# Patient Record
Sex: FEMALE | Race: WHITE | NOT HISPANIC OR LATINO | Employment: OTHER | URBAN - METROPOLITAN AREA
[De-identification: names, ages, dates, MRNs, and addresses within clinical notes are randomized per-mention and may not be internally consistent; named-entity substitution may affect disease eponyms.]

---

## 2023-03-14 ENCOUNTER — OFFICE VISIT (OUTPATIENT)
Dept: PRIMARY CARE | Facility: CLINIC | Age: 78
End: 2023-03-14
Payer: MEDICARE

## 2023-03-14 VITALS
BODY MASS INDEX: 27.92 KG/M2 | TEMPERATURE: 96.5 F | SYSTOLIC BLOOD PRESSURE: 120 MMHG | WEIGHT: 173 LBS | DIASTOLIC BLOOD PRESSURE: 70 MMHG

## 2023-03-14 DIAGNOSIS — M25.562 CHRONIC PAIN OF LEFT KNEE: Primary | ICD-10-CM

## 2023-03-14 DIAGNOSIS — G89.29 CHRONIC PAIN OF LEFT KNEE: Primary | ICD-10-CM

## 2023-03-14 PROCEDURE — 99213 OFFICE O/P EST LOW 20 MIN: CPT | Performed by: FAMILY MEDICINE

## 2023-03-14 PROCEDURE — 1159F MED LIST DOCD IN RCRD: CPT | Performed by: FAMILY MEDICINE

## 2023-03-14 RX ORDER — LEVOTHYROXINE SODIUM 112 UG/1
1 TABLET ORAL DAILY
COMMUNITY
Start: 2020-10-27 | End: 2023-04-12 | Stop reason: SDUPTHER

## 2023-03-14 RX ORDER — CALCIUM CARBONATE/VITAMIN D3 600MG-5MCG
1 TABLET ORAL 2 TIMES DAILY
COMMUNITY
Start: 2015-06-15 | End: 2024-05-13 | Stop reason: ALTCHOICE

## 2023-03-14 RX ORDER — PANTOPRAZOLE SODIUM 20 MG/1
20 TABLET, DELAYED RELEASE ORAL DAILY
COMMUNITY
End: 2024-03-15

## 2023-03-14 RX ORDER — PHENYLPROPANOLAMINE/CLEMASTINE 75-1.34MG
200 TABLET, EXTENDED RELEASE ORAL
COMMUNITY
Start: 2016-03-16 | End: 2024-03-15 | Stop reason: ALTCHOICE

## 2023-03-14 RX ORDER — TRAZODONE HYDROCHLORIDE 50 MG/1
1 TABLET ORAL NIGHTLY PRN
COMMUNITY
Start: 2021-11-10

## 2023-03-14 RX ORDER — BIOTIN 10 MG
1 TABLET ORAL DAILY
COMMUNITY
Start: 2022-01-06

## 2023-03-14 RX ORDER — HYDROXYZINE HYDROCHLORIDE 25 MG/1
25 TABLET, FILM COATED ORAL ONCE
COMMUNITY
Start: 2021-10-05 | End: 2023-09-19

## 2023-03-14 RX ORDER — FLUTICASONE PROPIONATE 50 MCG
2 SPRAY, SUSPENSION (ML) NASAL DAILY
COMMUNITY
Start: 2020-11-24 | End: 2023-10-25 | Stop reason: SDUPTHER

## 2023-03-14 RX ORDER — CHOLECALCIFEROL (VITAMIN D3) 25 MCG
1 TABLET ORAL DAILY
COMMUNITY
Start: 2022-10-19 | End: 2023-11-24

## 2023-03-14 RX ORDER — MELOXICAM 7.5 MG/1
7.5 TABLET ORAL DAILY
Qty: 30 TABLET | Refills: 0 | Status: SHIPPED
Start: 2023-03-14 | End: 2023-07-31 | Stop reason: ALTCHOICE

## 2023-03-14 ASSESSMENT — ENCOUNTER SYMPTOMS
BACK PAIN: 1
JOINT SWELLING: 1
CONSTITUTIONAL NEGATIVE: 1
ARTHRALGIAS: 1

## 2023-03-14 NOTE — PROGRESS NOTES
Subjective   Patient ID: Kristin Charles is a 77 y.o. female who presents for Knee Pain (Left knee pain).    Pt presents with sudden onset of left knee pain 2 weeks ago  No falls, no trauma  The pain is in the front of her knee  It radiates down the front of her leg  She has had sciatica in the past, she denies any hip or groin pain  Only 1 step in her heart   Previous knee imaging about 6 years ago due to the similar pain  She was seen at  Ortho  Reviewed 2014 knee images at   She was then seen at [facility] and had a steroid injection  She did PT in the past      She sits and does not move much anymore  Unable to vacuum  She feels low back tightness, worse on the right side    She has used Tylenol, heat and ice               Review of Systems   Constitutional: Negative.    Musculoskeletal:  Positive for arthralgias, back pain, gait problem and joint swelling.       Objective   /70 (BP Location: Right arm, Patient Position: Sitting)   Temp 35.8 °C (96.5 °F)   Wt 78.5 kg (173 lb)   BMI 27.92 kg/m²     Physical Exam  Constitutional:       Appearance: Normal appearance.   Musculoskeletal:         General: Swelling and tenderness present. No deformity or signs of injury.      Lumbar back: Spasms and tenderness present.      Left knee: Swelling and crepitus present. No erythema, ecchymosis, lacerations or bony tenderness. Decreased range of motion. Tenderness present over the medial joint line and MCL.      Instability Tests: Anterior drawer test negative.      Right lower leg: No edema.      Left lower leg: No edema.   Skin:     General: Skin is warm and dry.   Neurological:      General: No focal deficit present.      Mental Status: She is alert and oriented to person, place, and time.         Assessment/Plan   Diagnoses and all orders for this visit:  Chronic pain of left knee  -     XR knee left 4+ views; Future  -     meloxicam (Mobic) 7.5 mg tablet; Take 1 tablet (7.5 mg) by mouth once daily. Advised  to take with food, discussed R/B/SE  -     Referral to Orthopaedic Surgery; Future      Advised pt to call with any new or worsening symptoms

## 2023-04-06 PROBLEM — N32.81 OVERACTIVE BLADDER: Status: ACTIVE | Noted: 2023-04-06

## 2023-04-06 PROBLEM — K64.4 EXTERNAL HEMORRHOIDS: Status: ACTIVE | Noted: 2023-04-06

## 2023-04-06 PROBLEM — M19.90 OSTEOARTHRITIS: Status: ACTIVE | Noted: 2023-04-06

## 2023-04-06 PROBLEM — M17.12 LEFT KNEE DJD: Status: ACTIVE | Noted: 2023-04-06

## 2023-04-06 PROBLEM — F43.21 GRIEF AT LOSS OF CHILD: Status: ACTIVE | Noted: 2023-04-06

## 2023-04-06 PROBLEM — G47.00 INSOMNIA: Status: ACTIVE | Noted: 2023-04-06

## 2023-04-06 PROBLEM — N89.8 VAGINAL DISCHARGE: Status: ACTIVE | Noted: 2023-04-06

## 2023-04-06 PROBLEM — E66.3 OVERWEIGHT (BMI 25.0-29.9): Status: ACTIVE | Noted: 2023-04-06

## 2023-04-06 PROBLEM — R00.2 HEART PALPITATIONS: Status: ACTIVE | Noted: 2023-04-06

## 2023-04-06 PROBLEM — J33.9 NASAL POLYP: Status: ACTIVE | Noted: 2023-04-06

## 2023-04-06 PROBLEM — M17.10 OSTEOARTHRITIS, LOCALIZED, KNEE: Status: ACTIVE | Noted: 2023-04-06

## 2023-04-06 PROBLEM — L65.9 HAIR LOSS: Status: ACTIVE | Noted: 2023-04-06

## 2023-04-06 PROBLEM — N18.30 CKD (CHRONIC KIDNEY DISEASE) STAGE 3, GFR 30-59 ML/MIN (MULTI): Status: ACTIVE | Noted: 2023-04-06

## 2023-04-06 PROBLEM — M54.16 LUMBAR RADICULOPATHY: Status: ACTIVE | Noted: 2023-04-06

## 2023-04-06 PROBLEM — E03.9 HYPOTHYROIDISM: Status: ACTIVE | Noted: 2023-04-06

## 2023-04-06 PROBLEM — Z63.4 GRIEF AT LOSS OF CHILD: Status: ACTIVE | Noted: 2023-04-06

## 2023-04-06 PROBLEM — M43.06 PARS DEFECT OF LUMBAR SPINE: Status: ACTIVE | Noted: 2023-04-06

## 2023-04-06 PROBLEM — K21.9 GERD (GASTROESOPHAGEAL REFLUX DISEASE): Status: ACTIVE | Noted: 2023-04-06

## 2023-04-06 PROBLEM — E55.9 VITAMIN D DEFICIENCY: Status: ACTIVE | Noted: 2023-04-06

## 2023-04-06 PROBLEM — J30.9 ALLERGIC RHINITIS: Status: ACTIVE | Noted: 2023-04-06

## 2023-04-06 PROBLEM — M25.559 HIP PAIN: Status: ACTIVE | Noted: 2023-04-06

## 2023-04-06 PROBLEM — M54.2 NECK PAIN: Status: ACTIVE | Noted: 2023-04-06

## 2023-04-06 PROBLEM — J34.3 NASAL TURBINATE HYPERTROPHY: Status: ACTIVE | Noted: 2023-04-06

## 2023-04-06 PROBLEM — M71.20 BAKER'S CYST OF KNEE: Status: ACTIVE | Noted: 2023-04-06

## 2023-04-06 PROBLEM — F41.9 ANXIETY: Status: ACTIVE | Noted: 2023-04-06

## 2023-04-06 PROBLEM — B35.1 ONYCHOMYCOSIS: Status: ACTIVE | Noted: 2023-04-06

## 2023-04-06 RX ORDER — CELECOXIB 200 MG/1
1 CAPSULE ORAL DAILY
COMMUNITY

## 2023-04-07 ENCOUNTER — OFFICE VISIT (OUTPATIENT)
Dept: PRIMARY CARE | Facility: CLINIC | Age: 78
End: 2023-04-07
Payer: MEDICARE

## 2023-04-07 VITALS
BODY MASS INDEX: 32.1 KG/M2 | SYSTOLIC BLOOD PRESSURE: 122 MMHG | DIASTOLIC BLOOD PRESSURE: 60 MMHG | HEIGHT: 61 IN | WEIGHT: 170 LBS | TEMPERATURE: 96.3 F

## 2023-04-07 DIAGNOSIS — R19.7 DIARRHEA, UNSPECIFIED TYPE: Primary | ICD-10-CM

## 2023-04-07 DIAGNOSIS — M25.562 ACUTE PAIN OF LEFT KNEE: ICD-10-CM

## 2023-04-07 LAB
ALANINE AMINOTRANSFERASE (SGPT) (U/L) IN SER/PLAS: 20 U/L (ref 7–45)
ALBUMIN (G/DL) IN SER/PLAS: 4 G/DL (ref 3.4–5)
ALKALINE PHOSPHATASE (U/L) IN SER/PLAS: 48 U/L (ref 33–136)
ANION GAP IN SER/PLAS: 13 MMOL/L (ref 10–20)
ASPARTATE AMINOTRANSFERASE (SGOT) (U/L) IN SER/PLAS: 20 U/L (ref 9–39)
BASOPHILS (10*3/UL) IN BLOOD BY AUTOMATED COUNT: 0.05 X10E9/L (ref 0–0.1)
BASOPHILS/100 LEUKOCYTES IN BLOOD BY AUTOMATED COUNT: 0.9 % (ref 0–2)
BILIRUBIN TOTAL (MG/DL) IN SER/PLAS: 0.5 MG/DL (ref 0–1.2)
CALCIUM (MG/DL) IN SER/PLAS: 8.9 MG/DL (ref 8.6–10.6)
CARBON DIOXIDE, TOTAL (MMOL/L) IN SER/PLAS: 25 MMOL/L (ref 21–32)
CHLORIDE (MMOL/L) IN SER/PLAS: 107 MMOL/L (ref 98–107)
CREATININE (MG/DL) IN SER/PLAS: 0.83 MG/DL (ref 0.5–1.05)
EOSINOPHILS (10*3/UL) IN BLOOD BY AUTOMATED COUNT: 0.22 X10E9/L (ref 0–0.4)
EOSINOPHILS/100 LEUKOCYTES IN BLOOD BY AUTOMATED COUNT: 4.1 % (ref 0–6)
ERYTHROCYTE DISTRIBUTION WIDTH (RATIO) BY AUTOMATED COUNT: 13.7 % (ref 11.5–14.5)
ERYTHROCYTE MEAN CORPUSCULAR HEMOGLOBIN CONCENTRATION (G/DL) BY AUTOMATED: 32.1 G/DL (ref 32–36)
ERYTHROCYTE MEAN CORPUSCULAR VOLUME (FL) BY AUTOMATED COUNT: 94 FL (ref 80–100)
ERYTHROCYTES (10*6/UL) IN BLOOD BY AUTOMATED COUNT: 4.76 X10E12/L (ref 4–5.2)
GFR FEMALE: 71 ML/MIN/1.73M2
GLUCOSE (MG/DL) IN SER/PLAS: 105 MG/DL (ref 74–99)
HEMATOCRIT (%) IN BLOOD BY AUTOMATED COUNT: 44.6 % (ref 36–46)
HEMOGLOBIN (G/DL) IN BLOOD: 14.3 G/DL (ref 12–16)
IMMATURE GRANULOCYTES/100 LEUKOCYTES IN BLOOD BY AUTOMATED COUNT: 0.2 % (ref 0–0.9)
LEUKOCYTES (10*3/UL) IN BLOOD BY AUTOMATED COUNT: 5.4 X10E9/L (ref 4.4–11.3)
LYMPHOCYTES (10*3/UL) IN BLOOD BY AUTOMATED COUNT: 1.23 X10E9/L (ref 0.8–3)
LYMPHOCYTES/100 LEUKOCYTES IN BLOOD BY AUTOMATED COUNT: 22.9 % (ref 13–44)
MONOCYTES (10*3/UL) IN BLOOD BY AUTOMATED COUNT: 0.55 X10E9/L (ref 0.05–0.8)
MONOCYTES/100 LEUKOCYTES IN BLOOD BY AUTOMATED COUNT: 10.2 % (ref 2–10)
NEUTROPHILS (10*3/UL) IN BLOOD BY AUTOMATED COUNT: 3.32 X10E9/L (ref 1.6–5.5)
NEUTROPHILS/100 LEUKOCYTES IN BLOOD BY AUTOMATED COUNT: 61.7 % (ref 40–80)
NRBC (PER 100 WBCS) BY AUTOMATED COUNT: 0 /100 WBC (ref 0–0)
PLATELETS (10*3/UL) IN BLOOD AUTOMATED COUNT: 188 X10E9/L (ref 150–450)
POTASSIUM (MMOL/L) IN SER/PLAS: 4.3 MMOL/L (ref 3.5–5.3)
PROTEIN TOTAL: 6.2 G/DL (ref 6.4–8.2)
SODIUM (MMOL/L) IN SER/PLAS: 141 MMOL/L (ref 136–145)
THYROTROPIN (MIU/L) IN SER/PLAS BY DETECTION LIMIT <= 0.05 MIU/L: 3.49 MIU/L (ref 0.44–3.98)
UREA NITROGEN (MG/DL) IN SER/PLAS: 15 MG/DL (ref 6–23)

## 2023-04-07 PROCEDURE — 80053 COMPREHEN METABOLIC PANEL: CPT

## 2023-04-07 PROCEDURE — 1159F MED LIST DOCD IN RCRD: CPT | Performed by: FAMILY MEDICINE

## 2023-04-07 PROCEDURE — 1036F TOBACCO NON-USER: CPT | Performed by: FAMILY MEDICINE

## 2023-04-07 PROCEDURE — 85025 COMPLETE CBC W/AUTO DIFF WBC: CPT

## 2023-04-07 PROCEDURE — 99214 OFFICE O/P EST MOD 30 MIN: CPT | Performed by: FAMILY MEDICINE

## 2023-04-07 PROCEDURE — 36415 COLL VENOUS BLD VENIPUNCTURE: CPT | Performed by: FAMILY MEDICINE

## 2023-04-07 PROCEDURE — 84443 ASSAY THYROID STIM HORMONE: CPT

## 2023-04-07 ASSESSMENT — PATIENT HEALTH QUESTIONNAIRE - PHQ9
1. LITTLE INTEREST OR PLEASURE IN DOING THINGS: NOT AT ALL
2. FEELING DOWN, DEPRESSED OR HOPELESS: NOT AT ALL
SUM OF ALL RESPONSES TO PHQ9 QUESTIONS 1 AND 2: 0

## 2023-04-07 NOTE — PROGRESS NOTES
"Chief complaint:   Chief Complaint   Patient presents with    Follow-up     6 month       HPI:  Kristin Charles is a 77 y.o. female who presents for evaluation of  knee pain. She is needing a knee replacement this year- she saw Dr. Higginbotham. She is now on Celebrex which is helping.    She has terrible diarrhea, watery which was present for 5 days after starting an anti-inflammatory (which was then stopped).  She felt nauseated then vomiting started. She has been eating mashed potatoes, mac and cheese, and Pedialyte. Stool was a dark green. No blood or mucus in the stool. No cramping with the diarrhea. This has been present for 2.5 -3 weeks now but is no longer explosive. She has 3 episodes daily (had more previously). No recent abx. She does not remember eating anything raw or undercooked. No recent travel.     Physical exam:  /60 (BP Location: Left arm, Patient Position: Sitting)   Temp 35.7 °C (96.3 °F)   Ht 1.549 m (5' 1\")   Wt 77.1 kg (170 lb)   BMI 32.12 kg/m²   General: NAD, well appearing female  Heart: RRR, no mumur appreciated  Lungs: CTAB, no wheezes, rales, rhonchi  Abdomen: soft, non tender, hyperactive BS, no organomegaly  Extremities: No LE edema    Assessment/Plan   Problem List Items Addressed This Visit    None  Visit Diagnoses       Diarrhea, unspecified type    -  Primary    Relevant Orders    Stool Pathogen Panel, PCR    Lactoferrin, Fecal, Quantitative    Ova/Para + Giardia/Cryptosporidium Antigen    C. difficile, PCR    Comprehensive metabolic panel    Tsh With Reflex To Free T4 If Abnormal    CBC and Auto Differential    Acute pain of left knee        Relevant Orders    Referral to Orthopaedic Surgery        Labs and stool studies ordered, follow up pending results  For her knee pain, referral provided for second opinion, follow up AJ Muñoz DO        "

## 2023-04-08 ENCOUNTER — LAB (OUTPATIENT)
Dept: LAB | Facility: LAB | Age: 78
End: 2023-04-08
Payer: MEDICARE

## 2023-04-08 DIAGNOSIS — R19.7 DIARRHEA, UNSPECIFIED TYPE: ICD-10-CM

## 2023-04-08 PROCEDURE — 87328 CRYPTOSPORIDIUM AG IA: CPT

## 2023-04-08 PROCEDURE — 87505 NFCT AGENT DETECTION GI: CPT

## 2023-04-08 PROCEDURE — 87209 SMEAR COMPLEX STAIN: CPT

## 2023-04-08 PROCEDURE — 87177 OVA AND PARASITES SMEARS: CPT

## 2023-04-08 PROCEDURE — 87493 C DIFF AMPLIFIED PROBE: CPT

## 2023-04-08 PROCEDURE — 87329 GIARDIA AG IA: CPT

## 2023-04-08 PROCEDURE — 83630 LACTOFERRIN FECAL (QUAL): CPT

## 2023-04-08 PROCEDURE — 36415 COLL VENOUS BLD VENIPUNCTURE: CPT

## 2023-04-09 LAB
C. DIFFICILE TOXIN, PCR: NOT DETECTED
CAMPYLOBACTER GP: NOT DETECTED
NOROVIRUS GI/GII: NOT DETECTED
ROTAVIRUS A: NOT DETECTED
SALMONELLA SP.: NOT DETECTED
SHIGA TOXIN 1: NOT DETECTED
SHIGA TOXIN 2: NOT DETECTED
SHIGELLA SP.: NOT DETECTED
VIBRIO GRP.: NOT DETECTED
YERSINIA ENTEROCOLITICA: NOT DETECTED

## 2023-04-11 ENCOUNTER — TELEPHONE (OUTPATIENT)
Dept: PRIMARY CARE | Facility: CLINIC | Age: 78
End: 2023-04-11
Payer: MEDICARE

## 2023-04-11 NOTE — TELEPHONE ENCOUNTER
Pt is calling asking if you can give her a call? She said she has a few questions about her recent lab work and tests that she had done that she would like to discuss with you. Her phone number is [phone].

## 2023-04-12 ENCOUNTER — TELEPHONE (OUTPATIENT)
Dept: PRIMARY CARE | Facility: CLINIC | Age: 78
End: 2023-04-12
Payer: MEDICARE

## 2023-04-12 DIAGNOSIS — E03.9 HYPOTHYROIDISM, UNSPECIFIED TYPE: Primary | ICD-10-CM

## 2023-04-12 LAB — LACTOFERRIN, FECAL, QUANT.: POSITIVE

## 2023-04-12 RX ORDER — LEVOTHYROXINE SODIUM 112 UG/1
112 TABLET ORAL DAILY
Qty: 90 TABLET | Refills: 3 | Status: SHIPPED
Start: 2023-04-12 | End: 2023-11-16 | Stop reason: DRUGHIGH

## 2023-04-12 NOTE — TELEPHONE ENCOUNTER
REFILL  MEDICATION:     Levothyroxine Sodium 112 MCG; Take 1 tablet daily.     PHARM: [facility]   PHARM NUMBER: [phone]    LR: 10-7-22  90 tablets with 1 refill  LV: 4-7-23  NV: No future appt.

## 2023-04-12 NOTE — TELEPHONE ENCOUNTER
----- Message from Melanie Muñoz DO sent at 4/11/2023  6:06 PM EDT -----  Stool testing ok so far- waiting on one of the results

## 2023-04-13 LAB
CRYPTOSPORIDIUM ANTIGEN-DATA CONVERSION: NEGATIVE
GIARDIA LAMBLIA AG-DATA CONVERSION: NEGATIVE
OVA + PARASITE EXAM: NEGATIVE

## 2023-04-14 ENCOUNTER — TELEPHONE (OUTPATIENT)
Dept: PRIMARY CARE | Facility: CLINIC | Age: 78
End: 2023-04-14
Payer: MEDICARE

## 2023-04-14 DIAGNOSIS — R19.7 DIARRHEA, UNSPECIFIED TYPE: Primary | ICD-10-CM

## 2023-04-14 NOTE — TELEPHONE ENCOUNTER
----- Message from Melanie Muñoz DO sent at 4/14/2023 10:45 AM EDT -----  Last infectious stool testing was negative- she will need to see GI- please have her schedule with Dr. Munoz. Referral will be placed

## 2023-04-14 NOTE — TELEPHONE ENCOUNTER
----- Message from Marcy Love MA sent at 4/12/2023 10:24 AM EDT -----  Here sx went away over the weekend and then came back with Monday with vengeance.  Diarrhea and vomiting. Stopped vomiting but still has diarrhea.  Is there something she can take for this?   ----- Message -----  From: Melanie Muñoz DO  Sent: 4/11/2023   6:06 PM EDT  To: Marcy Love MA    Labs ok- how are her sx?

## 2023-05-05 PROBLEM — K52.832 LYMPHOCYTIC COLITIS: Status: ACTIVE | Noted: 2023-05-05

## 2023-05-15 LAB
ABO GROUP (TYPE) IN BLOOD: NORMAL
ACTIVATED PARTIAL THROMBOPLASTIN TIME IN PPP BY COAGULATION ASSAY: 33 SEC (ref 26–39)
ANION GAP IN SER/PLAS: 12 MMOL/L (ref 10–20)
ANTIBODY SCREEN: NORMAL
BASOPHILS (10*3/UL) IN BLOOD BY AUTOMATED COUNT: 0.05 X10E9/L (ref 0–0.1)
BASOPHILS/100 LEUKOCYTES IN BLOOD BY AUTOMATED COUNT: 0.8 % (ref 0–2)
CALCIUM (MG/DL) IN SER/PLAS: 9.4 MG/DL (ref 8.6–10.3)
CARBON DIOXIDE, TOTAL (MMOL/L) IN SER/PLAS: 29 MMOL/L (ref 21–32)
CHLORIDE (MMOL/L) IN SER/PLAS: 103 MMOL/L (ref 98–107)
CREATININE (MG/DL) IN SER/PLAS: 0.91 MG/DL (ref 0.5–1.05)
EOSINOPHILS (10*3/UL) IN BLOOD BY AUTOMATED COUNT: 0.39 X10E9/L (ref 0–0.4)
EOSINOPHILS/100 LEUKOCYTES IN BLOOD BY AUTOMATED COUNT: 5.9 % (ref 0–6)
ERYTHROCYTE DISTRIBUTION WIDTH (RATIO) BY AUTOMATED COUNT: 14.2 % (ref 11.5–14.5)
ERYTHROCYTE MEAN CORPUSCULAR HEMOGLOBIN CONCENTRATION (G/DL) BY AUTOMATED: 31.7 G/DL (ref 32–36)
ERYTHROCYTE MEAN CORPUSCULAR VOLUME (FL) BY AUTOMATED COUNT: 94 FL (ref 80–100)
ERYTHROCYTES (10*6/UL) IN BLOOD BY AUTOMATED COUNT: 4.98 X10E12/L (ref 4–5.2)
GFR FEMALE: 64 ML/MIN/1.73M2
GLUCOSE (MG/DL) IN SER/PLAS: 103 MG/DL (ref 74–99)
HEMATOCRIT (%) IN BLOOD BY AUTOMATED COUNT: 46.7 % (ref 36–46)
HEMOGLOBIN (G/DL) IN BLOOD: 14.8 G/DL (ref 12–16)
IMMATURE GRANULOCYTES/100 LEUKOCYTES IN BLOOD BY AUTOMATED COUNT: 0.3 % (ref 0–0.9)
INR IN PPP BY COAGULATION ASSAY: 1 (ref 0.9–1.1)
LEUKOCYTES (10*3/UL) IN BLOOD BY AUTOMATED COUNT: 6.7 X10E9/L (ref 4.4–11.3)
LYMPHOCYTES (10*3/UL) IN BLOOD BY AUTOMATED COUNT: 1.64 X10E9/L (ref 0.8–3)
LYMPHOCYTES/100 LEUKOCYTES IN BLOOD BY AUTOMATED COUNT: 24.7 % (ref 13–44)
MONOCYTES (10*3/UL) IN BLOOD BY AUTOMATED COUNT: 0.57 X10E9/L (ref 0.05–0.8)
MONOCYTES/100 LEUKOCYTES IN BLOOD BY AUTOMATED COUNT: 8.6 % (ref 2–10)
NEUTROPHILS (10*3/UL) IN BLOOD BY AUTOMATED COUNT: 3.98 X10E9/L (ref 1.6–5.5)
NEUTROPHILS/100 LEUKOCYTES IN BLOOD BY AUTOMATED COUNT: 59.7 % (ref 40–80)
NRBC (PER 100 WBCS) BY AUTOMATED COUNT: 0 /100 WBC (ref 0–0)
PLATELETS (10*3/UL) IN BLOOD AUTOMATED COUNT: 208 X10E9/L (ref 150–450)
POTASSIUM (MMOL/L) IN SER/PLAS: 4.5 MMOL/L (ref 3.5–5.3)
PROTHROMBIN TIME (PT) IN PPP BY COAGULATION ASSAY: 11.6 SEC (ref 9.8–13.4)
RH FACTOR: NORMAL
SODIUM (MMOL/L) IN SER/PLAS: 139 MMOL/L (ref 136–145)
UREA NITROGEN (MG/DL) IN SER/PLAS: 18 MG/DL (ref 6–23)

## 2023-05-16 ENCOUNTER — TELEPHONE (OUTPATIENT)
Dept: PRIMARY CARE | Facility: CLINIC | Age: 78
End: 2023-05-16
Payer: MEDICARE

## 2023-05-16 DIAGNOSIS — R94.31 ABNORMAL EKG: Primary | ICD-10-CM

## 2023-05-16 DIAGNOSIS — D73.89 SPLENIC LESION: ICD-10-CM

## 2023-05-17 ENCOUNTER — OFFICE VISIT (OUTPATIENT)
Dept: PRIMARY CARE | Facility: CLINIC | Age: 78
End: 2023-05-17
Payer: MEDICARE

## 2023-05-17 VITALS
RESPIRATION RATE: 16 BRPM | WEIGHT: 170 LBS | SYSTOLIC BLOOD PRESSURE: 124 MMHG | OXYGEN SATURATION: 96 % | HEIGHT: 63 IN | TEMPERATURE: 97.7 F | BODY MASS INDEX: 30.12 KG/M2 | HEART RATE: 62 BPM | DIASTOLIC BLOOD PRESSURE: 84 MMHG

## 2023-05-17 DIAGNOSIS — R94.31 ABNORMAL EKG: Primary | ICD-10-CM

## 2023-05-17 PROCEDURE — 1160F RVW MEDS BY RX/DR IN RCRD: CPT | Performed by: FAMILY MEDICINE

## 2023-05-17 PROCEDURE — 99213 OFFICE O/P EST LOW 20 MIN: CPT | Performed by: FAMILY MEDICINE

## 2023-05-17 PROCEDURE — 1159F MED LIST DOCD IN RCRD: CPT | Performed by: FAMILY MEDICINE

## 2023-05-17 PROCEDURE — 93000 ELECTROCARDIOGRAM COMPLETE: CPT | Performed by: FAMILY MEDICINE

## 2023-05-17 PROCEDURE — 1036F TOBACCO NON-USER: CPT | Performed by: FAMILY MEDICINE

## 2023-05-17 ASSESSMENT — PAIN SCALES - GENERAL: PAINLEVEL: 0-NO PAIN

## 2023-05-17 ASSESSMENT — PATIENT HEALTH QUESTIONNAIRE - PHQ9
SUM OF ALL RESPONSES TO PHQ9 QUESTIONS 1 AND 2: 0
2. FEELING DOWN, DEPRESSED OR HOPELESS: NOT AT ALL
1. LITTLE INTEREST OR PLEASURE IN DOING THINGS: NOT AT ALL

## 2023-05-17 NOTE — PROGRESS NOTES
"Chief complaint:   Chief Complaint   Patient presents with    Abnormal ECG       HPI:  Kristin Charles is a 77 y.o. female who presents for evaluation of abnormal EKG seen at [facility]. She is feeling well. No acute concerns.     Physical exam:  /84 (BP Location: Right arm, Patient Position: Sitting, BP Cuff Size: Adult)   Pulse 62   Temp 36.5 °C (97.7 °F) (Temporal)   Resp 16   Ht 1.6 m (5' 3\")   Wt 77.1 kg (170 lb)   SpO2 96%   BMI 30.11 kg/m²   General: NAD, well appearing female  Heart: RRR, 3/6 grade systolic murmur appreciated  Lungs: CTAB, no wheezes, rales, rhonchi    Assessment/Plan   Problem List Items Addressed This Visit    None  Visit Diagnoses       Abnormal EKG    -  Primary    Relevant Orders    ECG 12 lead (Clinic Performed) (Completed)        Referral to cardiology previously placed: Dr. Rodriguez for EKG changes noted from 2021. New anterior fascicular block and abnormal P axis    Melanie Muñoz, DO        "

## 2023-05-18 ENCOUNTER — TELEPHONE (OUTPATIENT)
Dept: PRIMARY CARE | Facility: CLINIC | Age: 78
End: 2023-05-18
Payer: MEDICARE

## 2023-05-18 NOTE — TELEPHONE ENCOUNTER
Pt is calling and wanted to let you know that she did call and schedule her appt with the cardiologist. She wasn't able to get in with the doctor you recommend. Pt said she is scheduled with Dr. Mccrary.

## 2023-05-25 ENCOUNTER — HOSPITAL ENCOUNTER (OUTPATIENT)
Dept: DATA CONVERSION | Facility: HOSPITAL | Age: 78
End: 2023-05-26
Attending: ORTHOPAEDIC SURGERY | Admitting: ORTHOPAEDIC SURGERY
Payer: MEDICARE

## 2023-05-25 DIAGNOSIS — M17.12 UNILATERAL PRIMARY OSTEOARTHRITIS, LEFT KNEE: ICD-10-CM

## 2023-05-25 DIAGNOSIS — N18.30 CHRONIC KIDNEY DISEASE, STAGE 3 UNSPECIFIED (MULTI): ICD-10-CM

## 2023-05-25 DIAGNOSIS — Z88.2 ALLERGY STATUS TO SULFONAMIDES: ICD-10-CM

## 2023-05-25 DIAGNOSIS — Z87.891 PERSONAL HISTORY OF NICOTINE DEPENDENCE: ICD-10-CM

## 2023-05-25 DIAGNOSIS — M21.162 VARUS DEFORMITY, NOT ELSEWHERE CLASSIFIED, LEFT KNEE: ICD-10-CM

## 2023-05-25 DIAGNOSIS — E03.9 HYPOTHYROIDISM, UNSPECIFIED: ICD-10-CM

## 2023-05-26 LAB
ANION GAP IN SER/PLAS: 11 MMOL/L (ref 10–20)
BASOPHILS (10*3/UL) IN BLOOD BY AUTOMATED COUNT: 0.01 X10E9/L (ref 0–0.1)
BASOPHILS/100 LEUKOCYTES IN BLOOD BY AUTOMATED COUNT: 0.1 % (ref 0–2)
CALCIUM (MG/DL) IN SER/PLAS: 8.5 MG/DL (ref 8.6–10.3)
CARBON DIOXIDE, TOTAL (MMOL/L) IN SER/PLAS: 24 MMOL/L (ref 21–32)
CHLORIDE (MMOL/L) IN SER/PLAS: 107 MMOL/L (ref 98–107)
CREATININE (MG/DL) IN SER/PLAS: 0.83 MG/DL (ref 0.5–1.05)
EOSINOPHILS (10*3/UL) IN BLOOD BY AUTOMATED COUNT: 0.02 X10E9/L (ref 0–0.4)
EOSINOPHILS/100 LEUKOCYTES IN BLOOD BY AUTOMATED COUNT: 0.1 % (ref 0–6)
ERYTHROCYTE DISTRIBUTION WIDTH (RATIO) BY AUTOMATED COUNT: 14.3 % (ref 11.5–14.5)
ERYTHROCYTE MEAN CORPUSCULAR HEMOGLOBIN CONCENTRATION (G/DL) BY AUTOMATED: 32.6 G/DL (ref 32–36)
ERYTHROCYTE MEAN CORPUSCULAR VOLUME (FL) BY AUTOMATED COUNT: 93 FL (ref 80–100)
ERYTHROCYTES (10*6/UL) IN BLOOD BY AUTOMATED COUNT: 4.11 X10E12/L (ref 4–5.2)
GFR FEMALE: 71 ML/MIN/1.73M2
GLUCOSE (MG/DL) IN SER/PLAS: 153 MG/DL (ref 74–99)
HEMATOCRIT (%) IN BLOOD BY AUTOMATED COUNT: 38.3 % (ref 36–46)
HEMOGLOBIN (G/DL) IN BLOOD: 12.5 G/DL (ref 12–16)
IMMATURE GRANULOCYTES/100 LEUKOCYTES IN BLOOD BY AUTOMATED COUNT: 0.5 % (ref 0–0.9)
LEUKOCYTES (10*3/UL) IN BLOOD BY AUTOMATED COUNT: 14.1 X10E9/L (ref 4.4–11.3)
LYMPHOCYTES (10*3/UL) IN BLOOD BY AUTOMATED COUNT: 1.4 X10E9/L (ref 0.8–3)
LYMPHOCYTES/100 LEUKOCYTES IN BLOOD BY AUTOMATED COUNT: 9.9 % (ref 13–44)
MONOCYTES (10*3/UL) IN BLOOD BY AUTOMATED COUNT: 1.34 X10E9/L (ref 0.05–0.8)
MONOCYTES/100 LEUKOCYTES IN BLOOD BY AUTOMATED COUNT: 9.5 % (ref 2–10)
NEUTROPHILS (10*3/UL) IN BLOOD BY AUTOMATED COUNT: 11.29 X10E9/L (ref 1.6–5.5)
NEUTROPHILS/100 LEUKOCYTES IN BLOOD BY AUTOMATED COUNT: 79.9 % (ref 40–80)
NRBC (PER 100 WBCS) BY AUTOMATED COUNT: 0 /100 WBC (ref 0–0)
PLATELETS (10*3/UL) IN BLOOD AUTOMATED COUNT: 185 X10E9/L (ref 150–450)
POTASSIUM (MMOL/L) IN SER/PLAS: 4.2 MMOL/L (ref 3.5–5.3)
SODIUM (MMOL/L) IN SER/PLAS: 138 MMOL/L (ref 136–145)
UREA NITROGEN (MG/DL) IN SER/PLAS: 13 MG/DL (ref 6–23)

## 2023-05-27 LAB
ANION GAP IN SER/PLAS: NORMAL
BASOPHILS (10*3/UL) IN BLOOD BY AUTOMATED COUNT: NORMAL
BASOPHILS/100 LEUKOCYTES IN BLOOD BY AUTOMATED COUNT: NORMAL
CALCIUM (MG/DL) IN SER/PLAS: NORMAL
CARBON DIOXIDE, TOTAL (MMOL/L) IN SER/PLAS: NORMAL
CHLORIDE (MMOL/L) IN SER/PLAS: NORMAL
CREATININE (MG/DL) IN SER/PLAS: NORMAL
EOSINOPHILS (10*3/UL) IN BLOOD BY AUTOMATED COUNT: NORMAL
EOSINOPHILS/100 LEUKOCYTES IN BLOOD BY AUTOMATED COUNT: NORMAL
ERYTHROCYTE DISTRIBUTION WIDTH (RATIO) BY AUTOMATED COUNT: NORMAL
ERYTHROCYTE MEAN CORPUSCULAR HEMOGLOBIN CONCENTRATION (G/DL) BY AUTOMATED: NORMAL
ERYTHROCYTE MEAN CORPUSCULAR VOLUME (FL) BY AUTOMATED COUNT: NORMAL
ERYTHROCYTES (10*6/UL) IN BLOOD BY AUTOMATED COUNT: NORMAL
GFR FEMALE: NORMAL
GFR MALE: NORMAL
GLUCOSE (MG/DL) IN SER/PLAS: NORMAL
HEMATOCRIT (%) IN BLOOD BY AUTOMATED COUNT: NORMAL
HEMOGLOBIN (G/DL) IN BLOOD: NORMAL
IMMATURE GRANULOCYTES/100 LEUKOCYTES IN BLOOD BY AUTOMATED COUNT: NORMAL
LEUKOCYTES (10*3/UL) IN BLOOD BY AUTOMATED COUNT: NORMAL
LYMPHOCYTES (10*3/UL) IN BLOOD BY AUTOMATED COUNT: NORMAL
LYMPHOCYTES/100 LEUKOCYTES IN BLOOD BY AUTOMATED COUNT: NORMAL
MANUAL DIFFERENTIAL Y/N: NORMAL
MONOCYTES (10*3/UL) IN BLOOD BY AUTOMATED COUNT: NORMAL
MONOCYTES/100 LEUKOCYTES IN BLOOD BY AUTOMATED COUNT: NORMAL
NEUTROPHILS (10*3/UL) IN BLOOD BY AUTOMATED COUNT: NORMAL
NEUTROPHILS/100 LEUKOCYTES IN BLOOD BY AUTOMATED COUNT: NORMAL
NRBC (PER 100 WBCS) BY AUTOMATED COUNT: NORMAL
PLATELETS (10*3/UL) IN BLOOD AUTOMATED COUNT: NORMAL
POTASSIUM (MMOL/L) IN SER/PLAS: NORMAL
SODIUM (MMOL/L) IN SER/PLAS: NORMAL
UREA NITROGEN (MG/DL) IN SER/PLAS: NORMAL

## 2023-07-31 ENCOUNTER — OFFICE VISIT (OUTPATIENT)
Dept: PRIMARY CARE | Facility: CLINIC | Age: 78
End: 2023-07-31
Payer: MEDICARE

## 2023-07-31 VITALS — SYSTOLIC BLOOD PRESSURE: 140 MMHG | BODY MASS INDEX: 29.23 KG/M2 | WEIGHT: 165 LBS | DIASTOLIC BLOOD PRESSURE: 70 MMHG

## 2023-07-31 DIAGNOSIS — L30.9 DERMATITIS: ICD-10-CM

## 2023-07-31 DIAGNOSIS — R21 RASH AND NONSPECIFIC SKIN ERUPTION: Primary | ICD-10-CM

## 2023-07-31 PROCEDURE — 1126F AMNT PAIN NOTED NONE PRSNT: CPT | Performed by: FAMILY MEDICINE

## 2023-07-31 PROCEDURE — 1159F MED LIST DOCD IN RCRD: CPT | Performed by: FAMILY MEDICINE

## 2023-07-31 PROCEDURE — 1160F RVW MEDS BY RX/DR IN RCRD: CPT | Performed by: FAMILY MEDICINE

## 2023-07-31 PROCEDURE — 1036F TOBACCO NON-USER: CPT | Performed by: FAMILY MEDICINE

## 2023-07-31 PROCEDURE — 99213 OFFICE O/P EST LOW 20 MIN: CPT | Performed by: FAMILY MEDICINE

## 2023-07-31 RX ORDER — TRIAMCINOLONE ACETONIDE 1 MG/G
CREAM TOPICAL
Qty: 30 G | Refills: 0 | Status: SHIPPED
Start: 2023-07-31 | End: 2023-11-07

## 2023-07-31 ASSESSMENT — ENCOUNTER SYMPTOMS: CONSTITUTIONAL NEGATIVE: 1

## 2023-07-31 NOTE — PROGRESS NOTES
"Subjective   Patient ID: Edel Charles is a 78 y.o. female who presents for Insect Bite (2 weeks ago, insect bite more on the left leg than right, itches, has tried otc medication, swollen/Had total knee replacement in left knee 8 weeks ago. ).    Rash started 2.5 weeks ago on patio noticed little bumps with fluids.     Used benadryl and hydrocortisone creams. Then was washing it.  Took Claritin for AR a few days    Trying to avoid itching but \"almost impossible.\" Anything that touches it makes it itch.    Was not out walking anywhere.     No bleeding    Had a hx of poison ivy that was a more severe reaction  Uses [employer] for sensitive skin.           Review of Systems   Constitutional: Negative.    Skin:  Positive for rash.       Objective   /70   Wt 74.8 kg (165 lb)   BMI 29.23 kg/m²     Physical Exam  Skin:     Comments: Bilateral Lower legs, erythematous, slightly raised, circular areas, 1mm-3mm in size  No active drainage or discharge              Assessment/Plan   Diagnoses and all orders for this visit:  Rash and nonspecific skin eruption  -     triamcinolone (Kenalog) 0.1 % cream; Apply to affected area 1-2 times daily as needed. Avoid face and groin.  Dermatitis  -     triamcinolone (Kenalog) 0.1 % cream; Apply to affected area 1-2 times daily as needed. Avoid face and groin.      Advised her to use the topical steroid and then apply a lotion such as Aveeno or Aquaphor to the area twice daily x 7 days  Derm referral as well given recent knee replacement surgery     Shayla Ragland, DO       "

## 2023-09-07 VITALS — HEIGHT: 62 IN | BODY MASS INDEX: 31.16 KG/M2 | WEIGHT: 169.31 LBS | OXYGEN SATURATION: 96 %

## 2023-09-12 ENCOUNTER — OFFICE VISIT (OUTPATIENT)
Dept: PRIMARY CARE | Facility: CLINIC | Age: 78
End: 2023-09-12
Payer: MEDICARE

## 2023-09-12 VITALS — SYSTOLIC BLOOD PRESSURE: 130 MMHG | WEIGHT: 168 LBS | BODY MASS INDEX: 30.76 KG/M2 | DIASTOLIC BLOOD PRESSURE: 70 MMHG

## 2023-09-12 DIAGNOSIS — J01.10 ACUTE NON-RECURRENT FRONTAL SINUSITIS: Primary | ICD-10-CM

## 2023-09-12 DIAGNOSIS — D73.89 LESION OF SPLEEN: ICD-10-CM

## 2023-09-12 PROBLEM — Z96.60 STATUS POST JOINT REPLACEMENT: Status: RESOLVED | Noted: 2023-06-19 | Resolved: 2023-09-12

## 2023-09-12 PROBLEM — S76.009A OPEN WOUND OF HIP AND THIGH WITH TENDON INVOLVEMENT: Status: RESOLVED | Noted: 2018-05-24 | Resolved: 2023-09-12

## 2023-09-12 PROBLEM — S71.109A OPEN WOUND OF HIP AND THIGH WITH TENDON INVOLVEMENT: Status: RESOLVED | Noted: 2018-05-24 | Resolved: 2023-09-12

## 2023-09-12 PROBLEM — S71.009A OPEN WOUND OF HIP AND THIGH WITH TENDON INVOLVEMENT: Status: RESOLVED | Noted: 2018-05-24 | Resolved: 2023-09-12

## 2023-09-12 PROBLEM — L60.9 NAIL DISORDER, UNSPECIFIED: Status: RESOLVED | Noted: 2023-08-08 | Resolved: 2023-09-12

## 2023-09-12 PROBLEM — D48.5 NEOPLASM OF UNCERTAIN BEHAVIOR OF SKIN: Status: RESOLVED | Noted: 2023-08-08 | Resolved: 2023-09-12

## 2023-09-12 PROBLEM — S76.909A OPEN WOUND OF HIP AND THIGH WITH TENDON INVOLVEMENT: Status: RESOLVED | Noted: 2018-05-24 | Resolved: 2023-09-12

## 2023-09-12 PROCEDURE — 1126F AMNT PAIN NOTED NONE PRSNT: CPT | Performed by: FAMILY MEDICINE

## 2023-09-12 PROCEDURE — 1159F MED LIST DOCD IN RCRD: CPT | Performed by: FAMILY MEDICINE

## 2023-09-12 PROCEDURE — 99213 OFFICE O/P EST LOW 20 MIN: CPT | Performed by: FAMILY MEDICINE

## 2023-09-12 PROCEDURE — 1160F RVW MEDS BY RX/DR IN RCRD: CPT | Performed by: FAMILY MEDICINE

## 2023-09-12 PROCEDURE — 1036F TOBACCO NON-USER: CPT | Performed by: FAMILY MEDICINE

## 2023-09-12 RX ORDER — AMOXICILLIN AND CLAVULANATE POTASSIUM 875; 125 MG/1; MG/1
875 TABLET, FILM COATED ORAL 2 TIMES DAILY
Qty: 20 TABLET | Refills: 0 | Status: SHIPPED | OUTPATIENT
Start: 2023-09-12 | End: 2023-09-22

## 2023-09-12 NOTE — PROGRESS NOTES
Chief complaint:   Chief Complaint   Patient presents with    Nasal Congestion     1 month    chest congestion     1 month    Chest Pain     1 month        HPI:  Kristin Charles is a 78 y.o. female who presents for evaluation of 1mo of chest and nasal congestion after flying then staying in a hotel of which she was concerned for mold. She has had her sx without improvement despite using Flonase, Claritin or Zyrtec and childrens Robutussin. She has even tried Vicks.     Physical exam:  /70   Wt 76.2 kg (168 lb)   BMI 30.76 kg/m²   General: NAD, well appearing female  Heart: RRR, no mumur appreciated  Lungs: CTAB, no wheezes, rales, rhonchi  Abdomen: soft, non tender, normoactive BS, no organomegaly  Extremities: No LE edema    Assessment/Plan   Problem List Items Addressed This Visit    None  Visit Diagnoses       Acute non-recurrent frontal sinusitis    -  Primary    Relevant Medications    amoxicillin-pot clavulanate (Augmentin) 875-125 mg tablet    Lesion of spleen        Relevant Orders    CT abdomen pelvis w IV contrast        She is to follow up in 3-5 days if sinus sx are not improving with Augmentin.     Her CT for follow up splenic lesions was re-ordered today and is due after November 2023 (6-12 mo follow up is recommended). BMP ordered to be done the week prior to the testing for kidney function assessment     Melanie Muñoz DO

## 2023-09-19 ENCOUNTER — TELEPHONE (OUTPATIENT)
Dept: PRIMARY CARE | Facility: CLINIC | Age: 78
End: 2023-09-19
Payer: MEDICARE

## 2023-09-19 DIAGNOSIS — J01.90 ACUTE SINUSITIS, RECURRENCE NOT SPECIFIED, UNSPECIFIED LOCATION: Primary | ICD-10-CM

## 2023-09-19 RX ORDER — AZITHROMYCIN 250 MG/1
TABLET, FILM COATED ORAL
Qty: 6 TABLET | Refills: 0 | Status: SHIPPED | OUTPATIENT
Start: 2023-09-19 | End: 2023-09-24

## 2023-09-19 NOTE — TELEPHONE ENCOUNTER
Pt states that the Augmentin 875-125mg is not helping with her sinusitis, she states that in the past she has been prescribed Azithromycin 250mg which works well for her, she is wondering if a prescription for Azithromycin can be sent to [facility] pharmacy for her?

## 2023-09-19 NOTE — TELEPHONE ENCOUNTER
Stop Augmentin- start Azithromycin. She can not use Hydroxyzine with the Azithromycin. If not improving in 3 days, please follow up in office.

## 2023-09-30 NOTE — DISCHARGE SUMMARY
Send Summary:   Discharge Summary Providers:  Provider Role Provider Name   · Attending Pedro Palacios   · Referring Pedro Palacios   · Primary Melanie Muñoz       Note Recipients: Pedro Palacios MD ThomasMelanie,  - [phone] [Preferred]       Discharge:    Summary:   Admission Date: .25-May-2023 08:18:00   Discharge Date: 26-May-2023   Attending Physician at Discharge: Pedro Palacios   Admission Reason: Left knee osteoarthritis   Final Discharge Diagnoses: Left knee osteoarthritis  now status post total left knee replacement   Procedures: Date: 25-May-2023 12:43:00  Procedure Name: Left total knee arthroplasty   Condition at Discharge: Satisfactory   Disposition at Discharge: .Home   Hospital Course:    Edel is a pleasant 77  year-old female with a history of severe left knee osteoarthritis.  Patient was admitted on elective basis for  left total knee arthroplasty on 5/25/23.  Procedure was performed under  spinal anesthesia with IV Ancef for icera-operative antibiotics.  Please see operative note for further details of this procedure.  Patient  recovered in the PACU and was started on a multimodal pain protocol utilizing narcotic and non-narcotic pain medication. She had ASA 81mg by mouth twice a day and SCDs for DVT prophylaxis.  She did exceptionally well and by the afternoon of POD # 1 was mobilizing well with physical therapy, able to have ~90 degrees of  flexion, tolerate a regular diet, void on her own volition, walk several hundred feet with minimal difficulty, navigate stairs, and did not have any abnormalities with her vital signs.  Physical therapy recommended continued recovery at home with home  care services.  She was medically appropriate for discharge home on the afternoon of POD #1.  Her dressing was clean, dry and intact with  no staining or saturation.  No concern for DVT.  She will keep the Mepilex dressing in place for a week.  She will complete a total of 4  weeks of Aspirin 81mg by mouth twice a day.  She will have a  combination of Tylenol, Celebrex, and  Oxycodone for pain control.  She will follow-up with Dr. Palacios in 2-3  weeks for outpatient follow-up.        Discharge Information:    and Continuing Care:   Lab Results - Pending:    None  Radiology Results - Pending: None   Discharge Instructions:    Activity:           activity as tolerated.          May shower..  as the bandage is waterproof          May not drive until follow-up visit.            No pushing, pulling, or lifting objects greater than 10 pounds until follow-up visit.            Weight-bearing Instructions: weight-bearing as tolerated left leg.            You can put as much weight on the left leg as you can tolerate.  Continue to use a walker with ambulation for the first 1-2 weeks and transition to a cane as you feel safe/comfortable. Keep the Mepilex bandage in place for 1 week after surgery  and then it can be removed.  After it is removed you can continue to shower, but no baths, tubs, soaks, pools or hot tubs for a month after surgery until the incision fully heals. Similarly, do not apply any lotions, creams, or ointments over the incision  for a month.  Continue to wear your compression stockings for 2-3 weeks to help with swelling.  Continue to frequently ice using the cryotherapy machine.  Use this several hours throughout the day to help with pain and swelling.    Nutrition/Diet:           regular,  resume normal diet    Wound Care:           Wound Site:   Left knee          Wound Type:   surgical incision          Instructions:   no lotions, creams, or tub soaks          Other Instructions:   See Above    Additional Orders:           Additional Instructions:   You will have a number of medications to help control pain after surgery:  1) You can take Extra-Strength Tylenol (500mg) 2 tabs by mouth 3x/day along with Celebrex (100mg) once a day with meals for the first 1-2 weeks after  surgery to help with baseline pain and inflammation.    2) If you are having moderate to severe pain, there will be a Rx for Oxycodone (5mg) that can be taken 1-2 tabs every 4-6 hours on an as needed basis.     -Continue taking the Aspirin 81mg 2x/day for a month after surgery to minimize the risk of blood clots following surgery.     -If needed, you can take any combination of Colace (stool softener), Dulcolax or Miralax (laxative).  All of these are over-the-counter.     -The following Rxs have been prescribed to your pharmacy (, [place]): Oxycodone (pain medication) and Baby Aspirin (blood thinner).  You already have the Extra-Strength Tylenol and Celebrex at home.  All the stool softeners/laxatives are over-the-counter and  can be picked up at any local pharmacy.     You can resume all of your normal home medications and vitamins.     MEDICATION SIDE EFFECTS.  OXYCODONE: constipation, nausea, vomiting, upset stomach, drowsiness, dizziness, lightheadedness, itching, headache, blurred vision, dry mouth, sweating    -Call Dr. Palacios's office if there is any drainage after 7 days, increased redness/warmth/swelling at incision site, pain/tenderness of calf, swelling of calf that does not respond to elevation, SOB/chest pain.    -Do not visit the dentist until 3 months after the surgery date.  You will need to take an antibiotic prior to any dental procedure.  Please call [phone] and request a prescription for antibiotics for prior to these procedures     Home Care Certification:           Home Care Agency:    [facility] [phone]          Skilled Disciplines Ordered:   PT,  OT    Home Care Services:           Home Care Skilled Service:   Rehab (PT/OT/SP eval and treat),  wound care    Follow Up Appointments:    Follow-Up Appointment 01:           Physician/Dept/Service:   Dr. Pedro Palacios          Reason for Referral:   1st follow-up appointment, wound assessment and Xrays          Call to Schedule  in:   3 weeks          Location:   [facility]          Phone Number:   [phone]    Discharge Medications: Home Medication   biotin 10 mg oral tablet - 1 tab(s) orally once a day  Lipoflavonoid oral capsule - 1 cap(s) orally once a day  levothyroxine 112 mcg (0.112 mg) oral tablet - 1 tab(s) orally once a day  Tylenol Extra Strength 500 mg oral tablet - 2 tab(s) orally 3 times a day  for the first 2 weeks after surgery to help with baseline pain and inflammation.   Aspirin Enteric Coated 81 mg oral delayed release tablet - 1 tab(s) orally 2 times a day for a month after surgery to minimize the risk of developing blood clots.   oxyCODONE 5 mg oral tablet - 1-2 tab(s) orally every 4-6 hours as needed for moderate to severe pain     PRN Medication   fluticasone 50 mcg/inh nasal spray - 2 spray(s) nasal once a day, As Needed - for allergy symptoms  hydrOXYzine hydrochloride 25 mg oral tablet - 1 tab(s) orally 4 times a day, As Needed  traZODone 50 mg oral tablet - orally once a day, As Needed  CeleBREX 200 mg oral capsule - 1 cap(s) orally once a day for the first 2 weeks after surgery to help with baseline pain and inflammation.     DNR Status:   ·  Code Status Code Status order at time of discharge: Full Code       Electronic Signatures:  Pedro Palacios)  (Signed 28-May-2023 16:35)   Authored: Send Summary, Summary Content, Ongoing Care,  DNR Status, Note Completion      Last Updated: 28-May-2023 16:35 by Pedro Palacios)

## 2023-09-30 NOTE — PROGRESS NOTES
Service: Orthopaedics     Subjective Data:   CHEL MCKEON is a 77 year old Female who is Hospital Day # 2 and POD #1 for Left total knee arthroplasty.    Overnight Events: Patient had an uneventful night.   Additional Information:    Ms. Mckeon is feeling better today. Urinary incontinence related to block has resolved. She has been up ambulating in her room to bathroom with assist. She is tolerating  oral intake. She rates her left knee pain as mild.     Objective Data:     Objective Information:      T   P  R  BP   MAP  SpO2   Value  36  69  16  142/67   96  96%  Date/Time 5/26 8:00 5/26 8:00 5/26 8:00 5/26 8:00  5/26 8:00 5/26 8:00  Range  (35.6C - 36C )  (66 - 76 )  (16 - 18 )  (142 - 160 )/ (66 - 79 )  (91 - 96 )  (96% - 98% )   As of 25-May-2023 14:14:00, patient is on 2 L/min of oxygen via room air.      Pain reported at 5/26 9:15: 3 = Mild    Physical Exam by System:    Constitutional: Well developed, awake/alert, no distress,  alert and cooperative   Eyes: EOMI, clear sclera   ENMT: mucous membranes moist, no lesions seen   Respiratory/Thorax: Patent airways, with good chest  expansion, thorax symmetric   Musculoskeletal: left knee dressing dry and intact.    Bilateral lower extremities distally with intact dorsiflexion/plantarflexion/EHL.  DP palpable 2+/2   Neurological: alert,  intact senses   Lymphatic: No significant lymphadenopathy   Psychological: Appropriate mood and behavior     Medication:    Medications:          Continuous Medications       --------------------------------    1. Lactated Ringers Infusion:  1000  mL  IntraVenous  <Continuous>         Scheduled Medications       --------------------------------    1. Acetaminophen:  975  mg  Oral  Every 8 Hours    2. Aspirin Enteric Coated:  81  mg  Oral  2 Times a Day    3. Docusate:  100  mg  Oral  2 Times a Day    4. hydrOXYzine Hydrochloride (ATARAX):  25  mg  Oral  Every 12 Hours    5. Levothyroxine:  112  microgram(s)  Oral  Daily     6. Polyethylene Glycol:  17  gram(s)  Oral  Daily         PRN Medications       --------------------------------    1. Bisacodyl Enteric Coated:  10  mg  Oral  Daily    2. Fluticasone 50 microgram/ Nasal Inhalation:  2  spray(s)  Each Nostril  Daily    3. Magnesium Hydroxide -Al Hydrox -Simethicone Oral Liquid:  30  mL  Oral  Every 6 Hours    4. Morphine Injectable:  2  mg  IntraVenous Push  Every 2 Hours    5. Ondansetron Injectable:  4  mg  IntraVenous Push  Every 6 Hours    6. oxyCODONE Immediate Release:  5  mg  Oral  Every 4 Hours    7. oxyCODONE Immediate Release:  10  mg  Oral  Every 4 Hours    8. traZODone:  50  mg  Oral  At Bedtime        Recent Lab Results:    Results:    CBC: 5/26/2023 06:04              \     Hgb     /                              \     12.5       /  WBC  ----------------  Plt               14.1 H    ----------------    185              /     Hct     \                              /     38.3       \            RBC: 4.11     MCV: 93     Neutrophil %: 79.9      BMP: 5/26/2023 06:04  NA+        Cl-     BUN  /                         138    107    13  /  --------------------------------  Glucose                ---------------------------  153 H    K+     HCO3-   Creat \                         4.2  24    0.83  \  Calcium : 8.5 L    Anion Gap : 11      Radiology Results:    Results:        Impression:    1. Severe medial and mild lateral/patellofemoral compartment  osteoarthrosis.     Xray Knee Complete 4 or more View [Apr 1 2023  8:46AM]      Assessment and Plan:   Code Status:  ·  Code Status Full Code       Impression 1: Primary osteoarthritis of left knee   Plan for Impression 1: Postop day #1 status post  left total knee arthroplasty  PT/OT, weightbearing as tolerated left lower extremity  Pain regimen: Good pain control with intermittent oxycodone overnight  Bowel regimen: Dulcolax; Colace and MiraLAX  Hemoglobin: 12.5; lab work reviewed  VTE prophylaxis: Aspirin 81 mg twice  daily and SCDs   disposition: When appropriate, will discharge home with home health care, face-to-face is completed  Follow-up with Dr. Palacios as directed       Electronic Signatures:  Nicole Mascorro (PAC)  (Signed 26-May-2023 10:46)   Authored: Service, Subjective Data, Objective Data, Assessment  and Plan, Note Completion      Last Updated: 26-May-2023 10:46 by Nicole Mascorro (PAC)

## 2023-09-30 NOTE — H&P
History & Physical Reviewed:   I have reviewed the History and Physical dated:  15-May-2023   History and Physical reviewed and relevant findings noted. Patient examined to review pertinent physical  findings.: No significant changes   Home Medications Reviewed: no changes noted   Allergies Reviewed: no changes noted       ERAS (Enhanced Recovery After Surgery):  ·  ERAS Patient: yes   ·  CPM/PAT Utilization: yes   ·  Immunonutrition Recovery Drink Utilization: no   ·  Carbohydrate Supplement Drink Utilization: no     Consent:   COVID-19 Consent:  ·  COVID-19 Risk Consent Surgeon has reviewed key risks related to the risk of janie COVID-19 and if they contract COVID-19 what the risks are.       Electronic Signatures:  Pedro Palacios)  (Signed 25-May-2023 10:29)   Authored: History & Physical Reviewed, ERAS, Consent,  Note Completion      Last Updated: 25-May-2023 10:29 by Pedro Palacios)

## 2023-10-02 NOTE — OP NOTE
Post Operative Note:     PreOp Diagnosis: Left knee osteoarthritis   Post-Procedure Diagnosis: Left knee osteoarthritis   Procedure: Left total knee arthroplasty   Surgeon: Dr. Pedro Palacios   Resident/Fellow/Other Assistant: Mr. Noel Carpenter  (PA-C)   Anesthesia: Spinal with post-op adductor canal block   I.V. Fluids: 1800cc crystalloid   Estimated Blood Loss (mL): 50cc   Blood Replacement: None   Specimen: no   Complications: None   Findings: Severe left knee degenerative changes with  varus deformity   Patient Returned To/Condition: PACU in stable condition   Urine Output: 300cc (straight cath)   Drains and/or Catheters: None   Tourniquet Times: 80 minutes @250mmHg     Attestation:   Note Completion:  Attending Attestation I performed the procedure without a resident   Comments/ Additional Findings    IMPLANTS:  1.  DePuy Attune size #6 NARROW cemented LEFT PS femoral component.  2.  DePuy Attune size #5 cemented fixed bearing tibial base plate.  3.  DePuy Attune 6mm PS polyethylene fixed bearing tibial insert.    INDICATIONS:    The patient is an incredibly nice 77-year-old female who has severe LEFT knee osteoarthritis.  She has exhausted an extensive course of conservative treatment and is still having severe pain that is affecting her quality of life and ability to perform  day-to-day activities.  She was indicated for a LEFT total knee arthroplasty.  Please see my office note for further in-depth details of the informed consent, decision making process, prior non operative intervention, history and physical exam findings.   The patient understands the risk of exposure/janie COVID-19 during this hospitalization.  Her pre-operative COVID-19 testing was negative.     OPERATIVE DETAILS:    The patient was met in the preop holding area.  The LEFT knee was marked.  We reviewed her informed consent. She was transported to the OR and transferred from hospital bed to the operating room table.  A  time-out was performed to confirm the correct  patient, procedure, and laterality.  Her x-rays were on the PACS viewing monitor.  A spinal anesthesia was performed and she was supine and all bony prominences were well padded.  A tourniquet was placed high on the LEFT lower extremity.  A bump was placed  under her hip.  We prepped and draped the lower extremity in the usual sterile fashion.  A pre-incision pause, weight-based IV Ancef and oral TXA was administered and documented.  We exsanguinated the leg with an Esmarch bandage and inflated the tourniquet  to 250 mmHg.     We began by making a longitudinal incision over the knee.  We incised through the skin and subcutaneous tissue.  We came down to the level of the fascia, raised small full-thickness medial and lateral flaps.  We identified the medial border of the patella  and VMO.  We made a standard medial parapatellar arthrotomy.  We then in sequence performed a medial release.  We mobilized the extensor mechanism.  We were able to zina the patella and flex the knee to 90 degrees.  We removed the osteophytes over the  medial and lateral border of the distal femur.  We removed the majority of the infrapatellar fat pad. We excised the ACL and PCL.  She overall had some small osteophytes over the patella, but relatively mild degenerative changes.  No areas of exposed  bone and some small area of Grade 2-3 changes.  We were able to then identify the native notch and then gained access to the intramedullary canal using a step drill.  We then inserted the intramedullary guide and it was pinned in place such that a 5-degree  valgus cut angle and 9mm of distal resection.  We then made our distal femoral resection.  We then pinned the AP sizing guide in place parallel to the transepicondylar axis and perpendicular to Hines's line.  The femoral component measured to a #6  component.  We pinned a size #6 femoral 4-1 cutting guide in place and confirmed that we would  not notch the distal femur by using an milagros wing. We then made the cuts in sequence and removed the bony fragments.  We did not notch the distal femur.  We  then pinned and made the cut for the box.        We then subluxed the tibia anteriorly using a PCL retractor.  We then had excellent exposure to the proximal tibia.  We then pinned the extramedullary tibial cutting guide and placed centered over the medial third of the tibial tubercle in line with the  center of the ankle joint.  It was set at 3 degrees posterior slope.  We set our depth of resection ~2mm below the lowest defect of the medial tibial plateau.  We then made our tibial osteotomy to remove the fragment.  We then confirmed that it was in  neutral coronal alignment and appropriate tibial slope by using a block and a jose.  We then placed a laminar  and in sequence excised the medial meniscus, lateral meniscus, thickened and inflamed redundant posterior capsule as well as synovitis  along with the significant posterior condylar osteophytes medial and laterally.  We excised any remnant of the PCL.  We then subluxed the tibia anteriorly, and it was sized to a #5 component.  It was pinned in appropriate external rotation centered over  the medial third of the tibial tubercle.  We then reamed and used the keel punch.  We then impacted the size #6 PS femur trial and a NARROW femur fit her anatomy better.  We got optimal stability using a 6mm PS insert.  The knee was able to come into  full extension and we released a portion of the anterolateral capsule.  There was no recurvatum.  She did not buckle into extension with axial load.  Collateral ligaments were taut, competent and stable to varus and valgus stress at 0, 30, 90 degrees.   There was smooth patellar femoral tracking from 0 to 130 degrees and no lateral subluxation or tilt.  With the knee at 90 degrees and clamping down the arthrotomy with 2 towel clips, there was minimal anterior  subluxation of tibia.  We were happy with  our stability in the coronal and sagittal plane. Given there was relatively mild degenerative over the patella we elected to leave her patella unresurfaced.  We then drilled for the lug holes in the distal femur.  We chose these as our final implant sizes.   We then removed all of the trials, made small indentations in the bone with a high-speed bur for extra cement interdigitation.  We copiously irrigated all of the bony surfaces with normal saline, mixed 2 bags of DePuy HV bone cement without antibiotics,  pressurized the tibia and cemented in the size #5 tibial component followed by the size #6 NARROW PS femoral component. We removed any excess cement.  We then impacted the 6mm PS fixed bearing polyethylene component and reduced the knee.  We then placed  the knee into extension and axial load while the cement cured.  We copiously irrigated the total of 3L normal saline followed by 1 bottle of Irrisept solution.  We removed any bony debris and cement particles.  Once the cement had fully hardened, we made  sure there was no extruded cement that would irritate the soft tissues.  We confirmed that there was still excellent coronal and sagittal plane stability, and there was smooth patellofemoral tracking from 0 to 120 degrees.  We injected the posterior capsule  and periosteum with a total of 100mL of Ropivacaine, Epinephrine, Clonidine and Ketorolac.  We closed the arthrotomy with #1 Vicryl and #2 Stratafix.  We then closed the subcutaneous tissue with 2-0 Vicryl. The skin was closed with a running subcuticular  3-0 Monocryl.  The tourniquet was deflated the we applied Exofin followed by a silver impregnated Mepilex bandage.  All sponge counts and needle counts were correct x2.  I was present and scrubbed for the entirety of the procedure.  The drapes were removed.   An Ace bandage was placed for compression.  The patient had palpable pulses.  She was awoken from  anesthesia without any issues and transferred to the recovery room in stable condition.  She will get a post-op adductor canal block for regional anesthesia.     Pedro Palacios MD          Electronic Signatures:  Pedro Palacios)  (Signed 25-May-2023 12:56)   Authored: Post Operative Note, Note Completion      Last Updated: 25-May-2023 12:56 by Pedro Palacios)

## 2023-10-03 ENCOUNTER — TELEPHONE (OUTPATIENT)
Dept: PRIMARY CARE | Facility: CLINIC | Age: 78
End: 2023-10-03
Payer: MEDICARE

## 2023-10-03 NOTE — TELEPHONE ENCOUNTER
Pt was seen 2 weeks for a sinus infection and treated with antibiotics, she states that she is feeling much better but she does have nasal polyps that are causing her to have some congestion, she is wondering if it is safe for her to get the flu and covid vaccines?

## 2023-10-25 ENCOUNTER — TELEPHONE (OUTPATIENT)
Dept: PRIMARY CARE | Facility: CLINIC | Age: 78
End: 2023-10-25

## 2023-10-25 ENCOUNTER — OFFICE VISIT (OUTPATIENT)
Dept: OTOLARYNGOLOGY | Facility: CLINIC | Age: 78
End: 2023-10-25
Payer: MEDICARE

## 2023-10-25 VITALS
SYSTOLIC BLOOD PRESSURE: 141 MMHG | WEIGHT: 167.8 LBS | BODY MASS INDEX: 31.68 KG/M2 | TEMPERATURE: 97.3 F | DIASTOLIC BLOOD PRESSURE: 80 MMHG | HEIGHT: 61 IN

## 2023-10-25 DIAGNOSIS — E03.9 HYPOTHYROIDISM, UNSPECIFIED TYPE: Primary | ICD-10-CM

## 2023-10-25 DIAGNOSIS — J32.0 CHRONIC MAXILLARY SINUSITIS: ICD-10-CM

## 2023-10-25 DIAGNOSIS — J33.0 POLYP OF BOTH NASAL CAVITIES: Primary | ICD-10-CM

## 2023-10-25 DIAGNOSIS — J34.3 HYPERTROPHY OF BOTH INFERIOR NASAL TURBINATES: ICD-10-CM

## 2023-10-25 DIAGNOSIS — J34.2 DEVIATED NASAL SEPTUM: ICD-10-CM

## 2023-10-25 DIAGNOSIS — J32.2 CHRONIC ETHMOIDAL SINUSITIS: ICD-10-CM

## 2023-10-25 DIAGNOSIS — J30.9 CHRONIC ALLERGIC RHINITIS: ICD-10-CM

## 2023-10-25 PROCEDURE — 31231 NASAL ENDOSCOPY DX: CPT | Performed by: OTOLARYNGOLOGY

## 2023-10-25 PROCEDURE — 1159F MED LIST DOCD IN RCRD: CPT | Performed by: OTOLARYNGOLOGY

## 2023-10-25 PROCEDURE — 1160F RVW MEDS BY RX/DR IN RCRD: CPT | Performed by: OTOLARYNGOLOGY

## 2023-10-25 PROCEDURE — 1126F AMNT PAIN NOTED NONE PRSNT: CPT | Performed by: OTOLARYNGOLOGY

## 2023-10-25 PROCEDURE — 1036F TOBACCO NON-USER: CPT | Performed by: OTOLARYNGOLOGY

## 2023-10-25 PROCEDURE — 99214 OFFICE O/P EST MOD 30 MIN: CPT | Performed by: OTOLARYNGOLOGY

## 2023-10-25 RX ORDER — FLUTICASONE PROPIONATE 50 MCG
2 SPRAY, SUSPENSION (ML) NASAL DAILY
Qty: 16 G | Refills: 3 | Status: SHIPPED
Start: 2023-10-25 | End: 2024-04-22 | Stop reason: ALTCHOICE

## 2023-10-25 RX ORDER — PREDNISONE 10 MG/1
TABLET ORAL
Qty: 51 TABLET | Refills: 0 | Status: SHIPPED
Start: 2023-10-25 | End: 2023-12-27

## 2023-10-25 NOTE — PROGRESS NOTES
Impression:  1. Polyp of both nasal cavities        2. Chronic maxillary sinusitis        3. Chronic ethmoidal sinusitis        4. Chronic allergic rhinitis        5. Deviated nasal septum        6. Hypertrophy of both inferior nasal turbinates           RECOMMENDATIONS/PLAN :  I explained to the patient that she has extensive bilateral nasal polyps that are blocking both nasal passages and this is interfering with her breathing.  We will place her on oral prednisone tapering from 60 mg to 10 mg over the next 14 days.  I want her to continue Flonase nasal spray-2 puffs each nostril daily as well.  We had a lengthy discussion regarding the fact that she will probably need endoscopic surgery to open up her sinuses and remove the polyps and due to her age and comorbidities, I would like to refer her to one of my sinus colleagues that could possibly perform surgery in a hospital setting.  The patient is considering this in the near future and we will get a dedicated CT scan of the sinuses in December 2023.  She would like to hold off on surgery until January 2024.      **This electronic medical record note was created with the use of voice recognition software.  Despite proofreading, typographical or grammatical errors may be present that could affect meaning of content **    Subjective   Patient ID:     Edel Charles is a 78 y.o. female who presents to the office today with a history of chronic sinusitis and bilateral nasal polyps.  Her polyps have been managed medically for many years however recently she has noticed that she is not breathing well through her nose.  She denies any recent fever chills or any neurologic complaints blurred vision or pus draining from the sinuses.  About 6 weeks ago she did take 5 days worth of prednisone.  She has been using Flonase on and off.  She denies any significant headache.    ROS:  A detailed 12 system review of systems is noted on the intake form has been reviewed with the  patient with details noted in the HPI and scanned into the patient's medical record.    Objective     Past Medical History:   Diagnosis Date    Acute candidiasis of vulva and vagina     Yeast infection of the vagina    Acute laryngitis 03/20/2018    Acute laryngitis    Bitten or stung by nonvenomous insect and other nonvenomous arthropods, initial encounter 09/02/2015    Bug bite with infection    Candidiasis, unspecified 10/13/2017    Yeast infection    Chronic ethmoidal sinusitis 11/24/2020    Chronic ethmoidal sinusitis    Chronic maxillary sinusitis 08/06/2020    Chronic maxillary sinusitis    Laceration without foreign body of unspecified forearm, initial encounter 01/09/2022    Forearm laceration    Lumbago with sciatica, left side 03/29/2019    Chronic bilateral low back pain with left-sided sciatica    Nail disorder, unspecified 08/08/2023    Neoplasm of uncertain behavior of skin 08/08/2023    Open wound of hip and thigh with tendon involvement 05/24/2018    Personal history of diseases of the skin and subcutaneous tissue 01/09/2022    History of contact dermatitis    Personal history of diseases of the skin and subcutaneous tissue 09/02/2015    History of cellulitis    Personal history of other diseases of the musculoskeletal system and connective tissue     Personal history of arthritis    Personal history of other diseases of the nervous system and sense organs     History of cataract    Personal history of other diseases of the nervous system and sense organs 09/20/2019    History of sciatica    Personal history of other endocrine, nutritional and metabolic disease     History of hypothyroidism    Personal history of other infectious and parasitic diseases 09/23/2017    History of erysipelas    Personal history of other specified conditions 03/06/2020    History of epistaxis    Rash and other nonspecific skin eruption 06/15/2015    Skin rash    Sciatica 12/11/2014    Status post joint replacement  06/19/2023    Strain of muscle, fascia and tendon of the posterior muscle group at thigh level, unspecified thigh, initial encounter 05/01/2018    Hamstring strain    Varus deformity, not elsewhere classified, unspecified knee 03/06/2019    Acquired genu varum        Past Surgical History:   Procedure Laterality Date    CATARACT EXTRACTION  06/30/2014    Cataract Surgery    DILATION AND CURETTAGE OF UTERUS      OTHER SURGICAL HISTORY  01/09/2022    Colonoscopy    TONSILLECTOMY      TOTAL KNEE ARTHROPLASTY          Allergies   Allergen Reactions    Morphine Unknown    Sulfa (Sulfonamide Antibiotics) Unknown    Neomycin-Bacitracin-Polymyxin Rash          Current Outpatient Medications:     biotin 10 mg tablet, Take 1 tablet (10 mg) by mouth once daily., Disp: , Rfl:     celecoxib (CeleBREX) 200 mg capsule, Take 1 capsule (200 mg) by mouth once daily. With a meal, Disp: , Rfl:     fluticasone (Flonase) 50 mcg/actuation nasal spray, Administer 2 sprays into affected nostril(s) once daily., Disp: , Rfl:     ibuprofen (Motrin) capsule, Take 1 capsule (200 mg) by mouth., Disp: , Rfl:     levothyroxine (Synthroid, Levoxyl) 112 mcg tablet, Take 1 tablet (112 mcg) by mouth once daily., Disp: 90 tablet, Rfl: 3    pantoprazole (ProtoNix) 20 mg EC tablet, Take 1 tablet (20 mg) by mouth once daily., Disp: , Rfl:     calcium carbonate-vitamin D3 600 mg-5 mcg (200 unit) tablet, Take 1 tablet by mouth 2 times a day., Disp: , Rfl:     cholecalciferol (Vitamin D-3) 25 MCG (1000 UT) tablet, Take 1 tablet (25 mcg) by mouth once daily., Disp: , Rfl:     multivitamin-min-iron-FA-vit K 45 mg iron- 800 mcg-120 mcg capsule, Take 1 capsule by mouth once daily., Disp: , Rfl:     traZODone (Desyrel) 50 mg tablet, Take 1 tablet (50 mg) by mouth as needed at bedtime., Disp: , Rfl:     triamcinolone (Kenalog) 0.1 % cream, Apply to affected area 1-2 times daily as needed. Avoid face and groin. (Patient not taking: Reported on 9/12/2023), Disp: 30  "g, Rfl: 0     Tobacco Use: Medium Risk (10/25/2023)    Patient History     Smoking Tobacco Use: Former     Smokeless Tobacco Use: Never     Passive Exposure: Never        Alcohol Use: Not on file        Social History     Substance and Sexual Activity   Drug Use Never        Physical Exam:  Visit Vitals  /80   Temp 36.3 °C (97.3 °F) (Temporal)   Ht 1.549 m (5' 1\")   Wt 76.1 kg (167 lb 12.8 oz)   BMI 31.71 kg/m²   Smoking Status Former   BSA 1.81 m²      General: Patient is alert, oriented, cooperative in no apparent distress.  Head: Normocephalic, atraumatic.  Eyes: PERRL, EOMI, Conjunctiva is clear. No nystagmus.  Ears: Right Ear-- Pinna is normal.  External auditory canal is patent, no lesions. Tympanic membrane is [intact, translucent and has good mobility with my pneumatic otoscope. No effusion].  Mastoid is nontender.  Left ear-- Pinna is normal.  External auditory canal is patent, no lesions.  Tympanic membrane is [intact, translucent and has good mobility with my pneumatic otoscope.  No effusion].  Mastoid is nontender.  Nose: Septum is deviated to the right.  No septal perforation or lesions. No septal hematoma/ seroma.  No signs of bleeding.  Inferior turbinates are moderately swollen.   She has bilateral nasal polyps that are extending into her nasal cavity and blocking her breathing.  No active infectious drainage  Throat:  Floor of mouth is clear, no masses.  Tongue appears normal, no lesions or masses. Gums, gingiva, buccal mucosa appear pink and moist, no lesions. Teeth are in good repair.  No obvious dental infections.  Peritonsillar regions appear symmetric without swelling.  Hard and soft palate appear normal, no obvious cleft. Uvula is midline.  Oropharynx: No lesions. Retropharyngeal wall is flat.  No active postnasal drip.  Neck: Supple,  no lymphadenopathy.  No masses.  Salivary Glands: Symmetric bilaterally.  No palpable masses.  No evidence of acute infection or salivary " stones  Neurologic: Cranial Nerves 2-12 are grossly intact without focal deficits. Cerebellar function testing is normal.     Results:   []  Procedure:   Preoperative diagnosis: History of bilateral nasal polyps-status of those polyps  Postoperative diagnosis: same  Procedure: Rigid nasal endoscopy  Surgeon: Renzo Conteh DO  Assist: None  Anesthesia: 4% topical lidocaine mixed with 0.5% Afrin nasal spray 1:1  EBL: Minimal  Complications: None  Disposition: The patient tolerated the procedure well and there were no complications.  The patient was discharged home in stable condition.    Procedure:  After informed consent was obtained with the various risks, benefits, complications, and alternatives explained to the patient/ guardian, the patient was sat up in the ENT chair and  both nostrils were sprayed down with topical lidocaine 4% and .05% Afrin solution.   After allowing this to take effect, the Zero degree rigid endoscope was advanced into both nostrils. The following areas were visualized:    Bilateral nasal passages, nasal septum, nasal turbinates, ostiomeatal complexes and sinus ostia, nasopharynx and eustachian tube orifices. All structures were found to be normal except as follows:    Septum is deviated to the right.  She has extensive bilateral nasal polyps extending from her ostiomeatal complexes into the nasal cavity bilaterally.  This is blocking her breathing.  No active pus draining from the sinuses.  Inferior turbinates are mildly swollen.  Nasopharynx is clear.  No masses.    The patient tolerated the procedure well and there were no complications.    Renzo Conteh DO, FAOCO    Renzo Conteh DO

## 2023-10-25 NOTE — TELEPHONE ENCOUNTER
Patient scheduled her MWV for 11/24/23. She is going to get her CT abdomen & pelvis done a few days before that appt so she will be getting a BMP done prior to that. She would like to know if you could order her annual thyroid labs so she can get those done along with the BMP for her CT.

## 2023-11-07 ENCOUNTER — OFFICE VISIT (OUTPATIENT)
Dept: PRIMARY CARE | Facility: CLINIC | Age: 78
End: 2023-11-07
Payer: MEDICARE

## 2023-11-07 VITALS — SYSTOLIC BLOOD PRESSURE: 124 MMHG | DIASTOLIC BLOOD PRESSURE: 68 MMHG

## 2023-11-07 DIAGNOSIS — R93.1 DECREASED CARDIAC EJECTION FRACTION: Primary | ICD-10-CM

## 2023-11-07 PROBLEM — S76.322A: Status: RESOLVED | Noted: 2018-05-24 | Resolved: 2023-11-07

## 2023-11-07 PROBLEM — R07.2 PRECORDIAL PAIN: Status: ACTIVE | Noted: 2023-09-28

## 2023-11-07 PROBLEM — R06.09 DOE (DYSPNEA ON EXERTION): Status: ACTIVE | Noted: 2023-09-28

## 2023-11-07 PROBLEM — N89.8 VAGINAL DISCHARGE: Status: RESOLVED | Noted: 2023-04-06 | Resolved: 2023-11-07

## 2023-11-07 PROBLEM — B35.1 ONYCHOMYCOSIS: Status: RESOLVED | Noted: 2023-04-06 | Resolved: 2023-11-07

## 2023-11-07 PROBLEM — K52.832 LYMPHOCYTIC COLITIS: Status: RESOLVED | Noted: 2023-05-05 | Resolved: 2023-11-07

## 2023-11-07 PROBLEM — M71.20 BAKER'S CYST OF KNEE: Status: RESOLVED | Noted: 2023-04-06 | Resolved: 2023-11-07

## 2023-11-07 PROBLEM — E66.3 OVERWEIGHT (BMI 25.0-29.9): Status: RESOLVED | Noted: 2023-04-06 | Resolved: 2023-11-07

## 2023-11-07 PROBLEM — M17.12 OSTEOARTHRITIS OF LEFT KNEE: Status: ACTIVE | Noted: 2023-08-21

## 2023-11-07 PROCEDURE — 1126F AMNT PAIN NOTED NONE PRSNT: CPT | Performed by: FAMILY MEDICINE

## 2023-11-07 PROCEDURE — 1160F RVW MEDS BY RX/DR IN RCRD: CPT | Performed by: FAMILY MEDICINE

## 2023-11-07 PROCEDURE — 1036F TOBACCO NON-USER: CPT | Performed by: FAMILY MEDICINE

## 2023-11-07 PROCEDURE — 99213 OFFICE O/P EST LOW 20 MIN: CPT | Performed by: FAMILY MEDICINE

## 2023-11-07 PROCEDURE — 1159F MED LIST DOCD IN RCRD: CPT | Performed by: FAMILY MEDICINE

## 2023-11-07 RX ORDER — GLUCOSAM/CHONDRO/HERB 149/HYAL 750-100 MG
120 TABLET ORAL
COMMUNITY
End: 2024-05-13 | Stop reason: ALTCHOICE

## 2023-11-07 NOTE — PROGRESS NOTES
Chief complaint:   Chief Complaint   Patient presents with    Medical Advice/Question     Discuss recent echocardiogram        HPI:  Kristin Charles is a 78 y.o. female who presents to discuss recent cardiac work up and brought her paperwork and recent testing and recommendations. She denies any SOB, coughing.    Physical exam:  /68   General: NAD, well appearing female  Heart: RRR, systolic murmur appreciated    Assessment/Plan   Problem List Items Addressed This Visit    None  Visit Diagnoses       Decreased cardiac ejection fraction    -  Primary    Relevant Orders    Referral to Cardiology        Reviewed ECHO report with patient. Offered second opinion and referral to cardiology placed.     Melanie Muñoz, DO

## 2023-11-08 ENCOUNTER — TELEPHONE (OUTPATIENT)
Dept: PRIMARY CARE | Facility: CLINIC | Age: 78
End: 2023-11-08
Payer: MEDICARE

## 2023-11-08 NOTE — TELEPHONE ENCOUNTER
Pt is calling she called Dr. Villegas's office today to try and sched an appt, and was told that Dr. Villegas will review pts chart and call her back. Pt states that she received a email that you also recommended 4 to 5 other DrKimberly's.    Pt would like to know if she should sched with one of the other 's or wait to see if Dr. Villegas is going to take her as a patient?     Please call pt to let her know. Thank you.

## 2023-11-15 ENCOUNTER — LAB (OUTPATIENT)
Dept: LAB | Facility: LAB | Age: 78
End: 2023-11-15
Payer: MEDICARE

## 2023-11-15 DIAGNOSIS — D73.89 LESION OF SPLEEN: ICD-10-CM

## 2023-11-15 DIAGNOSIS — E03.9 HYPOTHYROIDISM, UNSPECIFIED TYPE: ICD-10-CM

## 2023-11-15 LAB
ALBUMIN SERPL BCP-MCNC: 3.9 G/DL (ref 3.4–5)
ALP SERPL-CCNC: 49 U/L (ref 33–136)
ALT SERPL W P-5'-P-CCNC: 13 U/L (ref 7–45)
ANION GAP SERPL CALC-SCNC: 12 MMOL/L (ref 10–20)
AST SERPL W P-5'-P-CCNC: 15 U/L (ref 9–39)
BILIRUB SERPL-MCNC: 0.8 MG/DL (ref 0–1.2)
BUN SERPL-MCNC: 12 MG/DL (ref 6–23)
CALCIUM SERPL-MCNC: 8.7 MG/DL (ref 8.6–10.3)
CHLORIDE SERPL-SCNC: 104 MMOL/L (ref 98–107)
CHOLEST SERPL-MCNC: 171 MG/DL (ref 0–199)
CHOLESTEROL/HDL RATIO: 3.2
CO2 SERPL-SCNC: 28 MMOL/L (ref 21–32)
CREAT SERPL-MCNC: 0.86 MG/DL (ref 0.5–1.05)
ERYTHROCYTE [DISTWIDTH] IN BLOOD BY AUTOMATED COUNT: 14.2 % (ref 11.5–14.5)
GFR SERPL CREATININE-BSD FRML MDRD: 68 ML/MIN/1.73M*2
GLUCOSE SERPL-MCNC: 159 MG/DL (ref 74–99)
HCT VFR BLD AUTO: 42.7 % (ref 36–46)
HDLC SERPL-MCNC: 53.6 MG/DL
HGB BLD-MCNC: 13.8 G/DL (ref 12–16)
LDLC SERPL CALC-MCNC: 90 MG/DL
MCH RBC QN AUTO: 30.5 PG (ref 26–34)
MCHC RBC AUTO-ENTMCNC: 32.3 G/DL (ref 32–36)
MCV RBC AUTO: 94 FL (ref 80–100)
NON HDL CHOLESTEROL: 117 MG/DL (ref 0–149)
NRBC BLD-RTO: 0 /100 WBCS (ref 0–0)
PLATELET # BLD AUTO: 166 X10*3/UL (ref 150–450)
POTASSIUM SERPL-SCNC: 4.3 MMOL/L (ref 3.5–5.3)
PROT SERPL-MCNC: 6.2 G/DL (ref 6.4–8.2)
RBC # BLD AUTO: 4.53 X10*6/UL (ref 4–5.2)
SODIUM SERPL-SCNC: 140 MMOL/L (ref 136–145)
T4 FREE SERPL-MCNC: 1.46 NG/DL (ref 0.61–1.12)
TRIGL SERPL-MCNC: 136 MG/DL (ref 0–149)
TSH SERPL-ACNC: 0.39 MIU/L (ref 0.44–3.98)
VLDL: 27 MG/DL (ref 0–40)
WBC # BLD AUTO: 5.4 X10*3/UL (ref 4.4–11.3)

## 2023-11-15 PROCEDURE — 84439 ASSAY OF FREE THYROXINE: CPT

## 2023-11-15 PROCEDURE — 84443 ASSAY THYROID STIM HORMONE: CPT

## 2023-11-15 PROCEDURE — 85027 COMPLETE CBC AUTOMATED: CPT

## 2023-11-15 PROCEDURE — 36415 COLL VENOUS BLD VENIPUNCTURE: CPT

## 2023-11-15 PROCEDURE — 80061 LIPID PANEL: CPT

## 2023-11-15 PROCEDURE — 80053 COMPREHEN METABOLIC PANEL: CPT

## 2023-11-16 DIAGNOSIS — E03.9 HYPOTHYROIDISM, UNSPECIFIED TYPE: Primary | ICD-10-CM

## 2023-11-16 RX ORDER — LEVOTHYROXINE SODIUM 100 UG/1
100 TABLET ORAL DAILY
Qty: 30 TABLET | Refills: 11 | Status: SHIPPED
Start: 2023-11-16 | End: 2023-11-29 | Stop reason: DRUGHIGH

## 2023-11-16 NOTE — TELEPHONE ENCOUNTER
I spoke with Pt and she states that she saw Dr. Hoover on 11/14/23, cardiologist who is with [facility], she states that she is comfortable with Dr. Hoover and he is very thorough.

## 2023-11-17 ENCOUNTER — ANCILLARY PROCEDURE (OUTPATIENT)
Dept: RADIOLOGY | Facility: CLINIC | Age: 78
End: 2023-11-17
Payer: MEDICARE

## 2023-11-17 ENCOUNTER — APPOINTMENT (OUTPATIENT)
Dept: RADIOLOGY | Facility: CLINIC | Age: 78
End: 2023-11-17
Payer: MEDICARE

## 2023-11-17 DIAGNOSIS — D73.89 LESION OF SPLEEN: ICD-10-CM

## 2023-11-17 PROCEDURE — 74177 CT ABD & PELVIS W/CONTRAST: CPT

## 2023-11-17 PROCEDURE — 2550000001 HC RX 255 CONTRASTS: Performed by: FAMILY MEDICINE

## 2023-11-17 PROCEDURE — 74177 CT ABD & PELVIS W/CONTRAST: CPT | Performed by: RADIOLOGY

## 2023-11-17 RX ADMIN — IOHEXOL 90 ML: 350 INJECTION, SOLUTION INTRAVENOUS at 10:16

## 2023-11-20 RX ORDER — METOPROLOL SUCCINATE 25 MG/1
25 TABLET, EXTENDED RELEASE ORAL
COMMUNITY
Start: 2023-11-15 | End: 2024-02-06

## 2023-11-20 RX ORDER — ASPIRIN 81 MG/1
81 TABLET ORAL
COMMUNITY
Start: 2023-11-15

## 2023-11-22 ENCOUNTER — TELEPHONE (OUTPATIENT)
Dept: PRIMARY CARE | Facility: CLINIC | Age: 78
End: 2023-11-22
Payer: MEDICARE

## 2023-11-22 DIAGNOSIS — R91.1 PULMONARY NODULE: Primary | ICD-10-CM

## 2023-11-22 DIAGNOSIS — J30.9 CHRONIC ALLERGIC RHINITIS: ICD-10-CM

## 2023-11-22 DIAGNOSIS — J33.0 POLYP OF BOTH NASAL CAVITIES: ICD-10-CM

## 2023-11-24 ENCOUNTER — OFFICE VISIT (OUTPATIENT)
Dept: PRIMARY CARE | Facility: CLINIC | Age: 78
End: 2023-11-24
Payer: MEDICARE

## 2023-11-24 VITALS
SYSTOLIC BLOOD PRESSURE: 122 MMHG | WEIGHT: 164 LBS | BODY MASS INDEX: 30.96 KG/M2 | HEIGHT: 61 IN | DIASTOLIC BLOOD PRESSURE: 72 MMHG

## 2023-11-24 DIAGNOSIS — R73.9 HYPERGLYCEMIA: ICD-10-CM

## 2023-11-24 DIAGNOSIS — Z00.00 ROUTINE GENERAL MEDICAL EXAMINATION AT HEALTH CARE FACILITY: Primary | ICD-10-CM

## 2023-11-24 LAB — POC FINGERSTICK BLOOD GLUCOSE: 138 MG/DL (ref 70–100)

## 2023-11-24 PROCEDURE — G0439 PPPS, SUBSEQ VISIT: HCPCS | Performed by: FAMILY MEDICINE

## 2023-11-24 PROCEDURE — 82962 GLUCOSE BLOOD TEST: CPT | Performed by: FAMILY MEDICINE

## 2023-11-24 PROCEDURE — 1170F FXNL STATUS ASSESSED: CPT | Performed by: FAMILY MEDICINE

## 2023-11-24 PROCEDURE — 1160F RVW MEDS BY RX/DR IN RCRD: CPT | Performed by: FAMILY MEDICINE

## 2023-11-24 PROCEDURE — 1159F MED LIST DOCD IN RCRD: CPT | Performed by: FAMILY MEDICINE

## 2023-11-24 PROCEDURE — 1126F AMNT PAIN NOTED NONE PRSNT: CPT | Performed by: FAMILY MEDICINE

## 2023-11-24 PROCEDURE — 1036F TOBACCO NON-USER: CPT | Performed by: FAMILY MEDICINE

## 2023-11-24 ASSESSMENT — ACTIVITIES OF DAILY LIVING (ADL)
DOING_HOUSEWORK: INDEPENDENT
BATHING: INDEPENDENT
DRESSING: INDEPENDENT
GROCERY_SHOPPING: INDEPENDENT
TAKING_MEDICATION: INDEPENDENT
MANAGING_FINANCES: INDEPENDENT

## 2023-11-24 NOTE — PROGRESS NOTES
"Subjective   Reason for Visit: Kristin Charles is an 78 y.o. female here for a Medicare Wellness visit.     Past Medical, Surgical, and Family History reviewed and updated in chart.    Reviewed all medications by prescribing practitioner or clinical pharmacist (such as prescriptions, OTCs, herbal therapies and supplements) and documented in the medical record.    HPI    Kristin Charles is a 78 year old female who presents. Overall feeling well. She is nervous about her upcoming testing and has a lot of testing to be doing. She will likely have cardiac surgery in January. She states she has her cardiac cath on Monday and will be getting further labs and an ECHO in the next few weeks. She is here today in addition to her medicare wellness visit to discuss elevated fasting glucose. She denies hx of diabetes or issues with her blood sugars. She was on steroids recently but has been off of these for a couple weeks.     Patient Care Team:  Melanie Muñoz DO as PCP - General  Melanie Muñoz DO as PCP - [employer] PCP     Review of Systems    Objective   Vitals:  /72   Ht 1.549 m (5' 1\")   Wt 74.4 kg (164 lb)   BMI 30.99 kg/m²       Physical Exam  Constitutional:       Appearance: Normal appearance.   HENT:      Head: Normocephalic and atraumatic.      Nose: Nose normal.      Mouth/Throat:      Mouth: Mucous membranes are moist.   Cardiovascular:      Rate and Rhythm: Normal rate and regular rhythm.      Pulses: Normal pulses.      Heart sounds: Murmur heard.   Abdominal:      General: Abdomen is flat. Bowel sounds are normal.      Palpations: Abdomen is soft.   Skin:     General: Skin is warm and dry.   Neurological:      General: No focal deficit present.      Mental Status: She is alert.   Psychiatric:         Mood and Affect: Mood normal.         Assessment/Plan   Problem List Items Addressed This Visit    None  Visit Diagnoses       Routine general medical examination at [facility] " facility    -  Primary    Relevant Orders    1 Year Follow Up In Primary Care - Wellness Exam    Hyperglycemia        Relevant Orders    POCT Fingerstick Glucose manually resulted (Completed)        Discussed at length her elevated glucose. She would like to improve diet and get through her testing over the next few weeks and reassess in 1 mo. She recently did have steroids but has not had in the past couple weeks.

## 2023-11-29 ENCOUNTER — TELEPHONE (OUTPATIENT)
Dept: PRIMARY CARE | Facility: CLINIC | Age: 78
End: 2023-11-29
Payer: MEDICARE

## 2023-11-29 DIAGNOSIS — E03.9 HYPOTHYROIDISM, UNSPECIFIED TYPE: ICD-10-CM

## 2023-11-29 DIAGNOSIS — R73.9 HYPERGLYCEMIA: Primary | ICD-10-CM

## 2023-11-29 RX ORDER — LEVOTHYROXINE SODIUM 112 UG/1
112 TABLET ORAL DAILY
Qty: 30 TABLET | Refills: 11 | Status: SHIPPED
Start: 2023-11-29 | End: 2024-02-09 | Stop reason: DRUGHIGH

## 2023-11-29 NOTE — TELEPHONE ENCOUNTER
I spoke with Pt and she would like to remain on the Levothyroxine 112mcg and recheck her blood work in 1 month, she has an appt scheduled on 12/27/23 and would also like to have her glucose done at the time of her thyroid blood work, can an order be placed for the glucose as well?

## 2023-11-29 NOTE — TELEPHONE ENCOUNTER
I would like her to decrease that dose to 100 mcg unless she wants to try continuing her current dose and recheck in 1 mo. Let me know and I will update her chart accordingly

## 2023-11-29 NOTE — TELEPHONE ENCOUNTER
Pt is calling in regards to her Levothyroxine. Pt states that you decreased her dosage to 100 MCG instead of 112 MCG. Pt states she was waiting to take it until she saw you on 11-24-23 for her appointment. Pt states she forgot to ask you if you wanted her to start the medication at the appointment. Pt would like to know if you wanted her to start taking the 100 MCG?

## 2023-12-11 ASSESSMENT — DERMATOLOGY QUALITY OF LIFE (QOL) ASSESSMENT
RATE HOW BOTHERED YOU ARE BY EFFECTS OF YOUR SKIN PROBLEMS ON YOUR ACTIVITIES (EG, GOING OUT, ACCOMPLISHING WHAT YOU WANT, WORK ACTIVITIES OR YOUR RELATIONSHIPS WITH OTHERS): 0 - NEVER BOTHERED
WHAT SINGLE SKIN CONDITION LISTED BELOW IS THE PATIENT ANSWERING THE QUALITY-OF-LIFE ASSESSMENT QUESTIONS ABOUT: NONE OF THE ABOVE
WHAT SINGLE SKIN CONDITION LISTED BELOW IS THE PATIENT ANSWERING THE QUALITY-OF-LIFE ASSESSMENT QUESTIONS ABOUT: NONE OF THE ABOVE
RATE HOW EMOTIONALLY BOTHERED YOU ARE BY YOUR SKIN PROBLEM (FOR EXAMPLE, WORRY, EMBARRASSMENT, FRUSTRATION): 0 - NEVER BOTHERED
RATE HOW BOTHERED YOU ARE BY SYMPTOMS OF YOUR SKIN PROBLEM (EG, ITCHING, STINGING BURNING, HURTING OR SKIN IRRITATION): 0 - NEVER BOTHERED
RATE HOW BOTHERED YOU ARE BY EFFECTS OF YOUR SKIN PROBLEMS ON YOUR ACTIVITIES (EG, GOING OUT, ACCOMPLISHING WHAT YOU WANT, WORK ACTIVITIES OR YOUR RELATIONSHIPS WITH OTHERS): 0 - NEVER BOTHERED
RATE HOW EMOTIONALLY BOTHERED YOU ARE BY YOUR SKIN PROBLEM (FOR EXAMPLE, WORRY, EMBARRASSMENT, FRUSTRATION): 0 - NEVER BOTHERED
RATE HOW BOTHERED YOU ARE BY SYMPTOMS OF YOUR SKIN PROBLEM (EG, ITCHING, STINGING BURNING, HURTING OR SKIN IRRITATION): 0 - NEVER BOTHERED

## 2023-12-12 ENCOUNTER — OFFICE VISIT (OUTPATIENT)
Dept: DERMATOLOGY | Facility: CLINIC | Age: 78
End: 2023-12-12
Payer: MEDICARE

## 2023-12-12 DIAGNOSIS — L57.8 PHOTOAGING OF SKIN: ICD-10-CM

## 2023-12-12 DIAGNOSIS — L85.3 XEROSIS CUTIS: ICD-10-CM

## 2023-12-12 DIAGNOSIS — L82.1 SEBORRHEIC KERATOSIS: ICD-10-CM

## 2023-12-12 DIAGNOSIS — D22.5 MELANOCYTIC NEVI OF TRUNK: ICD-10-CM

## 2023-12-12 DIAGNOSIS — L81.4 LENTIGO: ICD-10-CM

## 2023-12-12 DIAGNOSIS — D18.01 HEMANGIOMA OF SKIN: ICD-10-CM

## 2023-12-12 DIAGNOSIS — B35.1 TINEA UNGUIUM: ICD-10-CM

## 2023-12-12 DIAGNOSIS — Z85.828 PERSONAL HISTORY OF OTHER MALIGNANT NEOPLASM OF SKIN: Primary | ICD-10-CM

## 2023-12-12 PROCEDURE — 99213 OFFICE O/P EST LOW 20 MIN: CPT | Performed by: DERMATOLOGY

## 2023-12-12 PROCEDURE — 1160F RVW MEDS BY RX/DR IN RCRD: CPT | Performed by: DERMATOLOGY

## 2023-12-12 PROCEDURE — 1036F TOBACCO NON-USER: CPT | Performed by: DERMATOLOGY

## 2023-12-12 PROCEDURE — 1159F MED LIST DOCD IN RCRD: CPT | Performed by: DERMATOLOGY

## 2023-12-12 PROCEDURE — 1126F AMNT PAIN NOTED NONE PRSNT: CPT | Performed by: DERMATOLOGY

## 2023-12-12 RX ORDER — CICLOPIROX OLAMINE 7.7 MG/G
CREAM TOPICAL 2 TIMES DAILY
Qty: 30 G | Refills: 11 | Status: SHIPPED
Start: 2023-12-12 | End: 2023-12-12 | Stop reason: ENTERED-IN-ERROR

## 2023-12-12 RX ORDER — CICLOPIROX OLAMINE 7.7 MG/G
CREAM TOPICAL 2 TIMES DAILY
Qty: 30 G | Refills: 11 | Status: SHIPPED
Start: 2023-12-12 | End: 2023-12-12 | Stop reason: SDUPTHER

## 2023-12-12 RX ORDER — CICLOPIROX 80 MG/ML
SOLUTION TOPICAL
Qty: 6.6 ML | Refills: 11 | Status: SHIPPED
Start: 2023-12-12 | End: 2024-03-15

## 2023-12-12 ASSESSMENT — DERMATOLOGY QUALITY OF LIFE (QOL) ASSESSMENT
WHAT SINGLE SKIN CONDITION LISTED BELOW IS THE PATIENT ANSWERING THE QUALITY-OF-LIFE ASSESSMENT QUESTIONS ABOUT: NONE OF THE ABOVE
RATE HOW BOTHERED YOU ARE BY EFFECTS OF YOUR SKIN PROBLEMS ON YOUR ACTIVITIES (EG, GOING OUT, ACCOMPLISHING WHAT YOU WANT, WORK ACTIVITIES OR YOUR RELATIONSHIPS WITH OTHERS): 0 - NEVER BOTHERED
ARE THERE EXCLUSIONS OR EXCEPTIONS FOR THE QUALITY OF LIFE ASSESSMENT: NO
RATE HOW BOTHERED YOU ARE BY SYMPTOMS OF YOUR SKIN PROBLEM (EG, ITCHING, STINGING BURNING, HURTING OR SKIN IRRITATION): 0 - NEVER BOTHERED
RATE HOW EMOTIONALLY BOTHERED YOU ARE BY YOUR SKIN PROBLEM (FOR EXAMPLE, WORRY, EMBARRASSMENT, FRUSTRATION): 0 - NEVER BOTHERED

## 2023-12-12 ASSESSMENT — DERMATOLOGY PATIENT ASSESSMENT
DO YOU HAVE ANY NEW OR CHANGING LESIONS: NO
ARE YOU AN ORGAN TRANSPLANT RECIPIENT: NO
DO YOU USE SUNSCREEN: DAILY
FOR PATIENTS COMING IN FOR A FOLLOW-UP VISIT - HAVE THERE BEEN ANY CHANGES IN YOUR HEALTH SINCE YOUR LAST VISIT: NO
DO YOU USE A TANNING BED: NO

## 2023-12-12 ASSESSMENT — ITCH NUMERIC RATING SCALE: HOW SEVERE IS YOUR ITCHING?: 0

## 2023-12-12 ASSESSMENT — PATIENT GLOBAL ASSESSMENT (PGA): PATIENT GLOBAL ASSESSMENT: PATIENT GLOBAL ASSESSMENT:  2 - MILD

## 2023-12-12 NOTE — PROGRESS NOTES
"Subjective     Kristin Charles \"Edel\" is a 78 y.o. female who presents for the following: Skin Check (Pt here today for FBSE. Pt reports hx of BCC on left lower leg 20+ yrs ago. No concerns today.) and Nail Problem (Pt has discolored fingernails. Previously prescribed Ciclopirox cream(different provider), she would like a refill. Cream helping nails. ).     Review of Systems:  No other skin or systemic complaints other than what is documented elsewhere in the note.    The following portions of the chart were reviewed this encounter and updated as appropriate:       Specialty Problems          Dermatology Problems    Hair loss     Past Medical History:  Kristin Charles \"Crescencio"  has a past medical history of Acute candidiasis of vulva and vagina, Acute laryngitis (03/20/2018), Baker's cyst of knee (04/06/2023), Bitten or stung by nonvenomous insect and other nonvenomous arthropods, initial encounter (09/02/2015), Candidiasis, unspecified (10/13/2017), Chronic ethmoidal sinusitis (11/24/2020), Chronic maxillary sinusitis (08/06/2020), Laceration without foreign body of unspecified forearm, initial encounter (01/09/2022), Lumbago with sciatica, left side (03/29/2019), 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), Nail disorder, unspecified (08/08/2023), Neoplasm of uncertain behavior of skin (08/08/2023), Obesity (01/19/2015), Onychomycosis (04/06/2023), Open wound of hip and thigh with tendon involvement (05/24/2018), Overweight (BMI 25.0-29.9) (04/06/2023), Personal history of diseases of the skin and subcutaneous tissue (01/09/2022), Personal history of diseases of the skin and subcutaneous tissue (09/02/2015), Personal history of other diseases of the musculoskeletal system and connective tissue, Personal history of other diseases of the nervous system and sense organs, Personal history of other diseases of the nervous system and sense organs (09/20/2019), Personal history of other endocrine, nutritional and metabolic disease, " "Personal history of other infectious and parasitic diseases (09/23/2017), Personal history of other specified conditions (03/06/2020), Rash and other nonspecific skin eruption (06/15/2015), Rhinitis (10 years), Sciatica (12/11/2014), 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    Past Surgical History:  Kristin Charles \"Edel\"  has a past surgical history that includes Cataract extraction (06/30/2014); Other surgical history (01/09/2022); Tonsillectomy; Dilation and curettage of uterus; and Total knee arthroplasty.    Family History:  Patient family history includes Cancer in her sister.    Social History:  Kristin Charles \"Crescencio"  reports that she has quit smoking. Her smoking use included cigarettes. She has never been exposed to tobacco smoke. She has never used smokeless tobacco. She reports that she does not drink alcohol and does not use drugs.    Allergies:  Morphine, Other, Sulfa (sulfonamide antibiotics), Miconazole, and Neomycin-bacitracin-polymyxin    Current Medications / CAM's:    Current Outpatient Medications:     aspirin 81 mg EC tablet, Take 1 tablet (81 mg) by mouth once daily., Disp: , Rfl:     biotin 10 mg tablet, Take 1 tablet (10 mg) by mouth once daily., Disp: , Rfl:     calcium carbonate-vitamin D3 600 mg-5 mcg (200 unit) tablet, Take 1 tablet by mouth 2 times a day., Disp: , Rfl:     celecoxib (CeleBREX) 200 mg capsule, Take 1 capsule (200 mg) by mouth once daily. With a meal, Disp: , Rfl:     ciclopirox (Penlac) 8 % solution, Apply to the nails once daily, remove with isopropyl alcohol once weekly, Disp: 6.6 mL, Rfl: 11    fluticasone (Flonase) 50 mcg/actuation nasal spray, Administer 2 sprays into each nostril once daily., Disp: " 16 g, Rfl: 3    ibuprofen (Motrin) capsule, Take 1 capsule (200 mg) by mouth., Disp: , Rfl:     levothyroxine (Synthroid, Levoxyl) 112 mcg tablet, Take 1 tablet (112 mcg) by mouth once daily., Disp: 30 tablet, Rfl: 11    metoprolol succinate XL (Toprol-XL) 25 mg 24 hr tablet, Take 1 tablet (25 mg) by mouth once daily., Disp: , Rfl:     multivitamin-min-iron-FA-vit K 45 mg iron- 800 mcg-120 mcg capsule, Take 1 capsule by mouth once daily., Disp: , Rfl:     omega 3-dha-epa-fish oil (Fish OiL) 1,000 mg (120 mg-180 mg) capsule, Take 120 mg by mouth once daily., Disp: , Rfl:     pantoprazole (ProtoNix) 20 mg EC tablet, Take 1 tablet (20 mg) by mouth once daily., Disp: , Rfl:     predniSONE (Deltasone) 10 mg tablet, 60 mg by mouth x5 days, 40 mg by mouth x3 days, 20 mg by mouth x3 days, 10 mg by mouth x3 days, Disp: 51 tablet, Rfl: 0    traZODone (Desyrel) 50 mg tablet, Take 1 tablet (50 mg) by mouth as needed at bedtime., Disp: , Rfl:      Objective   Well appearing patient in no apparent distress; mood and affect are within normal limits.    A full examination was performed including scalp, head, eyes, ears, nose, lips, neck, chest, axillae, abdomen, back, buttocks, bilateral upper extremities, bilateral lower extremities, hands, feet, fingers, toes, fingernails, and toenails. All findings within normal limits unless otherwise noted below.    Well healed scar at site of prior treatment without evidence of recurrence.    Cherry red papules    Scattered tan macules in sun-exposed areas.    Mottled pigmentation with telangiectasias and brown reticular macules in sun exposed areas of the body.    Brown, tan waxy macules and stuck on appearing papules and plaques    Fine, ashy, pale to white scale diffusely over skin.    Tan-brown symmetric macules and papules    Left 3rd Finger Nail Plate, Left 4th Finger Nail Plate, Left 5th Finger Nail Plate  Onycholysis with yellowing and thickening of nail plate         Assessment/Plan    Personal history of other malignant neoplasm of skin    There is no evidence of recurrence on clinical examination today, reassurance was provided to the patient. The importance of sun protection was reviewed with the patient including the use of a broad spectrum sunscreen that protects against both UVA/UVB rays, with ingredients such as Zinc oxide or titanium dioxide, wearing sun protective clothing and sun avoidance. Warning signs of non-melanoma skin cancer were reviewed. ABCDEs of melanoma reviewed. Patient to f/u should they notice any new or changing pre-existing skin lesion    Related Procedures  Follow Up In Dermatology - Established Patient    Hemangioma of skin    The benign nature of these skin lesions were reviewed, no treatment is necessary.   Please follow up for any new or pre-existing lesion that is changing in size, shape, color, becomes painful, tender, itches or bleed.    Lentigo    These are benign skin lesions due to sun exposure. They will darken in response to sun exposure. They should be monitored for change in size, shape or color.  These lesions can be treated cosmetically with topical creams, liquid nitrogen and a variety of lasers.    Photoaging of skin    The risk of chronic, cumulative sun damage and risk of development of skin cancer was reviewed today.   The importance of sun protection was reviewed: including the use of a broad spectrum sunscreen that protects against both UVA/UVB rays, with ingredients such as Zinc oxide or titanium dioxide, wearing sun protective clothing and sun avoidance. We reviewed the warning signs of non-melanoma skin cancer and ABCDEs of melanoma  Please follow up should you notice any new or changing pre-existing skin lesion.    Seborrheic keratosis    The benign nature of these skin lesions reviewed, reassure provided and no further treatment needed at this time.   These lesions can be removed, if symptomatic (itching, bleeding, rubbing on clothing,  painful), otherwise removal is considered cosmetic.     Tinea unguium (3)  Left 3rd Finger Nail Plate; Left 4th Finger Nail Plate; Left 5th Finger Nail Plate    - Diagnosis and treatment options reviewed  - Patient reports good response to ciclopirox - new rx for Penlac solution provided to patient today apply daily   - Discussed trial of 1:1 white vinegar and water soaks at least 2-3 times weekly for 10-15 minutes    Related Medications  ciclopirox (Penlac) 8 % solution  Apply to the nails once daily, remove with isopropyl alcohol once weekly    Xerosis cutis    Dry skin is common, often worse during the dry months of the year and may also worsen as we age.  A gentle skin care regimen includes:  -washing with a mild soap without fragrance such as Dove for sensitive skin, Cetaphil or Cerave.  -pat dry after washing and then apply a thick moisturizer such as Cerave cream or Cetaphil cream. Creams are thicker than lotions and often do a better job of restoring the skin barrier.  -samples and coupons were provided today      Melanocytic nevi of trunk    Clinically benign appearing nevi, no treatment is necessary.  The importance of sun protection was reviewed: including the use of a broad spectrum sunscreen that protects against both UVA/UVB rays, with ingredients such as Zinc oxide or titanium dioxide, wearing sun protective clothing and sun avoidance.   ABCDEs of melanoma reviewed.  Please follow up should you notice any new or changing pre-existing skin lesion.       12/12/2023 12:46 PM  Yecenia Benitez MD  Dermatology Resident, PGY-4     Dictation #1  MRN:[id number]  [facility]:[id number] I saw and evaluated the patient. I personally obtained the key and critical portions of the history and physical exam or was physically present for key and critical portions performed by the resident/fellow. I reviewed the resident/fellow's documentation and discussed the patient with the resident/fellow. I agree with the resident/fellow's  medical decision making as documented in the note.    Alethea Fisher, DO

## 2023-12-22 ENCOUNTER — LAB (OUTPATIENT)
Dept: LAB | Facility: LAB | Age: 78
End: 2023-12-22
Payer: MEDICARE

## 2023-12-22 DIAGNOSIS — E03.9 HYPOTHYROIDISM, UNSPECIFIED TYPE: ICD-10-CM

## 2023-12-22 DIAGNOSIS — R73.9 HYPERGLYCEMIA: ICD-10-CM

## 2023-12-22 PROBLEM — I50.22 CHRONIC SYSTOLIC HEART FAILURE (MULTI): Status: ACTIVE | Noted: 2023-11-27

## 2023-12-22 PROBLEM — I35.0 AORTIC VALVE STENOSIS: Status: ACTIVE | Noted: 2023-11-27

## 2023-12-22 LAB
ANION GAP SERPL CALC-SCNC: 11 MMOL/L (ref 10–20)
BUN SERPL-MCNC: 21 MG/DL (ref 6–23)
CALCIUM SERPL-MCNC: 9.2 MG/DL (ref 8.6–10.3)
CHLORIDE SERPL-SCNC: 102 MMOL/L (ref 98–107)
CO2 SERPL-SCNC: 29 MMOL/L (ref 21–32)
CREAT SERPL-MCNC: 0.99 MG/DL (ref 0.5–1.05)
GFR SERPL CREATININE-BSD FRML MDRD: 58 ML/MIN/1.73M*2
GLUCOSE SERPL-MCNC: 145 MG/DL (ref 74–99)
POTASSIUM SERPL-SCNC: 4.1 MMOL/L (ref 3.5–5.3)
SODIUM SERPL-SCNC: 138 MMOL/L (ref 136–145)
TSH SERPL-ACNC: 0.5 MIU/L (ref 0.44–3.98)

## 2023-12-22 PROCEDURE — 36415 COLL VENOUS BLD VENIPUNCTURE: CPT

## 2023-12-22 PROCEDURE — 84443 ASSAY THYROID STIM HORMONE: CPT

## 2023-12-22 PROCEDURE — 80048 BASIC METABOLIC PNL TOTAL CA: CPT

## 2023-12-27 ENCOUNTER — OFFICE VISIT (OUTPATIENT)
Dept: PRIMARY CARE | Facility: CLINIC | Age: 78
End: 2023-12-27
Payer: MEDICARE

## 2023-12-27 ENCOUNTER — TELEPHONE (OUTPATIENT)
Dept: PRIMARY CARE | Facility: CLINIC | Age: 78
End: 2023-12-27

## 2023-12-27 VITALS — SYSTOLIC BLOOD PRESSURE: 124 MMHG | DIASTOLIC BLOOD PRESSURE: 74 MMHG

## 2023-12-27 DIAGNOSIS — I35.0 AORTIC VALVE STENOSIS, ETIOLOGY OF CARDIAC VALVE DISEASE UNSPECIFIED: ICD-10-CM

## 2023-12-27 DIAGNOSIS — R73.9 HYPERGLYCEMIA: ICD-10-CM

## 2023-12-27 DIAGNOSIS — H60.543 DERMATITIS OF BOTH EAR CANALS: Primary | ICD-10-CM

## 2023-12-27 LAB — POC HEMOGLOBIN A1C: 7.2 % (ref 4.2–6.5)

## 2023-12-27 PROCEDURE — 83036 HEMOGLOBIN GLYCOSYLATED A1C: CPT | Performed by: FAMILY MEDICINE

## 2023-12-27 PROCEDURE — 1160F RVW MEDS BY RX/DR IN RCRD: CPT | Performed by: FAMILY MEDICINE

## 2023-12-27 PROCEDURE — 1126F AMNT PAIN NOTED NONE PRSNT: CPT | Performed by: FAMILY MEDICINE

## 2023-12-27 PROCEDURE — 99214 OFFICE O/P EST MOD 30 MIN: CPT | Performed by: FAMILY MEDICINE

## 2023-12-27 PROCEDURE — 1159F MED LIST DOCD IN RCRD: CPT | Performed by: FAMILY MEDICINE

## 2023-12-27 PROCEDURE — 1036F TOBACCO NON-USER: CPT | Performed by: FAMILY MEDICINE

## 2023-12-27 RX ORDER — MOMETASONE FUROATE 1 MG/ML
SOLUTION TOPICAL
Qty: 60 ML | Refills: 1 | Status: SHIPPED
Start: 2023-12-27 | End: 2024-05-21

## 2023-12-27 NOTE — TELEPHONE ENCOUNTER
Please call Dr. Hoover office and request someone to reach out to the patient regarding next steps for her. She has completed some of the testing requested by her cardiologist and is waiting to hear if she has orders for additional testing as she is trying to complete everything needed to determine if she will be getting an aortic valve replacement. She has been provided Cori Jimenez CNP [phone] option 2 to contact but has left 2 messages at her office without any call back as of yet. Her cardiologist is Dr. Hoover.

## 2023-12-27 NOTE — ASSESSMENT & PLAN NOTE
- IO HbA1C 7.2%  - offered dietician referral for diabetic diet counseling  - she will follow up for blood sugar assessment in 3 mo, sooner as needed

## 2023-12-27 NOTE — PROGRESS NOTES
Chief complaint:   Chief Complaint   Patient presents with    Follow-up     Glucose follow up       HPI:  Kristin Charles is a 78 y.o. female who presents for evaluation of her elevated blood sugar which remains elevated. It has improved but fasting glucose last week was 149.    She is anxiously waiting to see what she needs to do next for her work up for aortic valve replacement which she thought she would be scheduled for soon but has not had any calls back from her cardiologist office and has left 2 messages at the office. She has been provided with Cori Jimenez, LAURA [phone] option 2 to contact for next steps: Her cardiologist is Dr. Hoover. She has completed her ECHO, stress test, cardiac cath but does not know what else she needs to be doing.     Additionally she needs a refill of her Mometasone for her ear eczema, previously ordered by her ENT who has passed away.   Physical exam:  /74   General: NAD, well appearing female  Heart: RRR, murmur appreciated  Lungs: CTAB, no wheezes, rales, rhonchi    Assessment/Plan   Problem List Items Addressed This Visit       Aortic valve stenosis    Hyperglycemia     - IO HbA1C 7.2%  - offered dietician referral for diabetic diet counseling  - she will follow up for blood sugar assessment in 3 mo, sooner as needed         Relevant Orders    POCT glycosylated hemoglobin (Hb A1C) manually resulted (Completed)     Other Visit Diagnoses       Dermatitis of both ear canals    -  Primary    Relevant Medications    mometasone (Elocon) 0.1 % lotion            Melanie Muñoz DO

## 2023-12-28 NOTE — TELEPHONE ENCOUNTER
I spoke with [place] in Dr. Hoover office, [place] informed me that she has not received any voicemails from the patient, [place] also informed me that Cori Jimenez will be out of the office until Tuesday 1/2/24. [place] will reach out to the patient.

## 2024-02-06 ENCOUNTER — OFFICE VISIT (OUTPATIENT)
Dept: PRIMARY CARE | Facility: CLINIC | Age: 79
End: 2024-02-06
Payer: MEDICARE

## 2024-02-06 VITALS
SYSTOLIC BLOOD PRESSURE: 140 MMHG | DIASTOLIC BLOOD PRESSURE: 80 MMHG | WEIGHT: 165.34 LBS | BODY MASS INDEX: 31.24 KG/M2

## 2024-02-06 DIAGNOSIS — M25.511 ACUTE PAIN OF BOTH SHOULDERS: Primary | ICD-10-CM

## 2024-02-06 DIAGNOSIS — M25.552 BILATERAL HIP PAIN: ICD-10-CM

## 2024-02-06 DIAGNOSIS — M25.512 ACUTE PAIN OF BOTH SHOULDERS: Primary | ICD-10-CM

## 2024-02-06 DIAGNOSIS — M25.551 BILATERAL HIP PAIN: ICD-10-CM

## 2024-02-06 DIAGNOSIS — N18.31 STAGE 3A CHRONIC KIDNEY DISEASE (MULTI): ICD-10-CM

## 2024-02-06 DIAGNOSIS — E03.9 HYPOTHYROIDISM, UNSPECIFIED TYPE: ICD-10-CM

## 2024-02-06 LAB
ALBUMIN SERPL BCP-MCNC: 4.2 G/DL (ref 3.4–5)
ALP SERPL-CCNC: 56 U/L (ref 33–136)
ALT SERPL W P-5'-P-CCNC: 11 U/L (ref 7–45)
ANION GAP SERPL CALC-SCNC: 16 MMOL/L (ref 10–20)
AST SERPL W P-5'-P-CCNC: 14 U/L (ref 9–39)
BASOPHILS # BLD AUTO: 0.06 X10*3/UL (ref 0–0.1)
BASOPHILS NFR BLD AUTO: 1 %
BILIRUB SERPL-MCNC: 0.7 MG/DL (ref 0–1.2)
BUN SERPL-MCNC: 13 MG/DL (ref 6–23)
CALCIUM SERPL-MCNC: 9.5 MG/DL (ref 8.6–10.3)
CHLORIDE SERPL-SCNC: 103 MMOL/L (ref 98–107)
CO2 SERPL-SCNC: 26 MMOL/L (ref 21–32)
CREAT SERPL-MCNC: 0.87 MG/DL (ref 0.5–1.05)
CRP SERPL-MCNC: 1.25 MG/DL
EGFRCR SERPLBLD CKD-EPI 2021: 67 ML/MIN/1.73M*2
EOSINOPHIL # BLD AUTO: 0.41 X10*3/UL (ref 0–0.4)
EOSINOPHIL NFR BLD AUTO: 6.7 %
ERYTHROCYTE [DISTWIDTH] IN BLOOD BY AUTOMATED COUNT: 12.9 % (ref 11.5–14.5)
ERYTHROCYTE [SEDIMENTATION RATE] IN BLOOD BY WESTERGREN METHOD: 26 MM/H (ref 0–30)
GLUCOSE SERPL-MCNC: 161 MG/DL (ref 74–99)
HCT VFR BLD AUTO: 44.3 % (ref 36–46)
HGB BLD-MCNC: 14.2 G/DL (ref 12–16)
IMM GRANULOCYTES # BLD AUTO: 0.01 X10*3/UL (ref 0–0.5)
IMM GRANULOCYTES NFR BLD AUTO: 0.2 % (ref 0–0.9)
LYMPHOCYTES # BLD AUTO: 1.11 X10*3/UL (ref 0.8–3)
LYMPHOCYTES NFR BLD AUTO: 18 %
MCH RBC QN AUTO: 30.3 PG (ref 26–34)
MCHC RBC AUTO-ENTMCNC: 32.1 G/DL (ref 32–36)
MCV RBC AUTO: 95 FL (ref 80–100)
MONOCYTES # BLD AUTO: 0.54 X10*3/UL (ref 0.05–0.8)
MONOCYTES NFR BLD AUTO: 8.8 %
NEUTROPHILS # BLD AUTO: 4.03 X10*3/UL (ref 1.6–5.5)
NEUTROPHILS NFR BLD AUTO: 65.3 %
NRBC BLD-RTO: 0 /100 WBCS (ref 0–0)
PLATELET # BLD AUTO: 184 X10*3/UL (ref 150–450)
POTASSIUM SERPL-SCNC: 4.6 MMOL/L (ref 3.5–5.3)
PROT SERPL-MCNC: 6.6 G/DL (ref 6.4–8.2)
RBC # BLD AUTO: 4.68 X10*6/UL (ref 4–5.2)
SODIUM SERPL-SCNC: 140 MMOL/L (ref 136–145)
T4 FREE SERPL-MCNC: 1.42 NG/DL (ref 0.61–1.12)
TSH SERPL-ACNC: 4.51 MIU/L (ref 0.44–3.98)
WBC # BLD AUTO: 6.2 X10*3/UL (ref 4.4–11.3)

## 2024-02-06 PROCEDURE — 1159F MED LIST DOCD IN RCRD: CPT | Performed by: FAMILY MEDICINE

## 2024-02-06 PROCEDURE — 36415 COLL VENOUS BLD VENIPUNCTURE: CPT

## 2024-02-06 PROCEDURE — 99213 OFFICE O/P EST LOW 20 MIN: CPT | Performed by: FAMILY MEDICINE

## 2024-02-06 PROCEDURE — 80053 COMPREHEN METABOLIC PANEL: CPT

## 2024-02-06 PROCEDURE — 1036F TOBACCO NON-USER: CPT | Performed by: FAMILY MEDICINE

## 2024-02-06 PROCEDURE — 84443 ASSAY THYROID STIM HORMONE: CPT

## 2024-02-06 PROCEDURE — 85025 COMPLETE CBC W/AUTO DIFF WBC: CPT

## 2024-02-06 PROCEDURE — 84439 ASSAY OF FREE THYROXINE: CPT

## 2024-02-06 PROCEDURE — 85652 RBC SED RATE AUTOMATED: CPT

## 2024-02-06 PROCEDURE — 86140 C-REACTIVE PROTEIN: CPT

## 2024-02-06 PROCEDURE — 1160F RVW MEDS BY RX/DR IN RCRD: CPT | Performed by: FAMILY MEDICINE

## 2024-02-06 PROCEDURE — 1126F AMNT PAIN NOTED NONE PRSNT: CPT | Performed by: FAMILY MEDICINE

## 2024-02-06 RX ORDER — LOSARTAN POTASSIUM 25 MG/1
25 TABLET ORAL
COMMUNITY
Start: 2024-01-22 | End: 2024-03-15 | Stop reason: ALTCHOICE

## 2024-02-06 NOTE — PROGRESS NOTES
Chief complaint:   Chief Complaint   Patient presents with    Arm Pain     Bilateral upper arm pain for 5 days    Leg Pain     Bilateral groin/upper leg pain for 5 days       HPI:  Kristin Charles is a 78 y.o. female who presents for evaluation of shoulder and hip pain    2 weeks ago she started noting upper and lower leg pain. It is her hips and shoulders. She has trouble getting out of a chair. She notes her legs got sore first so she started using her arms to get up and is unsure if this is why her shoulders are hurting. She has trouble getting in an out of her car. Her upper thighs are affected with a heaviness and burning sensation. There is no redness, rash, firmness the skin. It is localized to the top of her thighs. She has started using her cane. She called her cardiologist yesterday as she had not been feeling well since starting Metoprolol. She stopped her Metoprolol 3 days ago. She has drastic improvement. She is not on a statin.    Physical exam:  /80   Wt 75 kg (165 lb 5.5 oz)   BMI 31.24 kg/m²   General: NAD, well appearing female  Heart: RRR  Lungs: CTAB, no wheezes, rales, rhonchi  MSK: difficulty raising from a chair- depending on holding on to arm rests, pain to palpation of the musculature low back bilaterally, strength limited in flexion of hip and knees to pain, +2/4 patellar reflexes    Assessment/Plan   Problem List Items Addressed This Visit       CKD (chronic kidney disease) stage 3, GFR 30-59 ml/min (CMS/[facility])    Relevant Orders    Comprehensive Metabolic Panel    Hip pain    Relevant Orders    C-reactive protein    Sedimentation Rate    CBC and Auto Differential    Hypothyroidism    Relevant Orders    Tsh With Reflex To Free T4 If Abnormal     Other Visit Diagnoses       Acute pain of both shoulders    -  Primary    Relevant Orders    C-reactive protein    Sedimentation Rate    CBC and Auto Differential        Updated her medication list (no longer on Metoprolol). Discussed option  to increase her Losartan to 50 mg PO daily, but she elects to check BP at home and wait for results.   Labs ordered  Tylenol 1000 mg PO q 8 hours PRN pain  Follow up pending results    Melanie Muñoz DO

## 2024-02-09 DIAGNOSIS — E03.9 HYPOTHYROIDISM, UNSPECIFIED TYPE: Primary | ICD-10-CM

## 2024-02-09 RX ORDER — LEVOTHYROXINE SODIUM 125 UG/1
125 TABLET ORAL DAILY
Qty: 30 TABLET | Refills: 11 | Status: SHIPPED | OUTPATIENT
Start: 2024-02-09 | End: 2024-05-21 | Stop reason: SDUPTHER

## 2024-02-13 ENCOUNTER — PATIENT MESSAGE (OUTPATIENT)
Dept: PRIMARY CARE | Facility: CLINIC | Age: 79
End: 2024-02-13
Payer: MEDICARE

## 2024-02-13 DIAGNOSIS — M79.10 MYALGIA: Primary | ICD-10-CM

## 2024-02-14 RX ORDER — METHYLPREDNISOLONE 4 MG/1
TABLET ORAL
Qty: 21 TABLET | Refills: 0 | Status: SHIPPED | OUTPATIENT
Start: 2024-02-14 | End: 2024-02-21

## 2024-02-21 ENCOUNTER — TELEPHONE (OUTPATIENT)
Dept: PRIMARY CARE | Facility: CLINIC | Age: 79
End: 2024-02-21

## 2024-02-21 ENCOUNTER — OFFICE VISIT (OUTPATIENT)
Dept: PRIMARY CARE | Facility: CLINIC | Age: 79
End: 2024-02-21
Payer: MEDICARE

## 2024-02-21 VITALS — SYSTOLIC BLOOD PRESSURE: 124 MMHG | DIASTOLIC BLOOD PRESSURE: 68 MMHG | TEMPERATURE: 96.4 F

## 2024-02-21 DIAGNOSIS — M35.3 PMR (POLYMYALGIA RHEUMATICA) (MULTI): Primary | ICD-10-CM

## 2024-02-21 DIAGNOSIS — I35.0 AORTIC VALVE STENOSIS, ETIOLOGY OF CARDIAC VALVE DISEASE UNSPECIFIED: Primary | ICD-10-CM

## 2024-02-21 PROBLEM — I73.89 OTHER SPECIFIED PERIPHERAL VASCULAR DISEASES (CMS-HCC): Status: ACTIVE | Noted: 2024-02-09

## 2024-02-21 PROCEDURE — 1126F AMNT PAIN NOTED NONE PRSNT: CPT | Performed by: FAMILY MEDICINE

## 2024-02-21 PROCEDURE — 99213 OFFICE O/P EST LOW 20 MIN: CPT | Performed by: FAMILY MEDICINE

## 2024-02-21 PROCEDURE — 1159F MED LIST DOCD IN RCRD: CPT | Performed by: FAMILY MEDICINE

## 2024-02-21 PROCEDURE — 1036F TOBACCO NON-USER: CPT | Performed by: FAMILY MEDICINE

## 2024-02-21 PROCEDURE — 1160F RVW MEDS BY RX/DR IN RCRD: CPT | Performed by: FAMILY MEDICINE

## 2024-02-21 RX ORDER — PREDNISONE 5 MG/1
15 TABLET ORAL DAILY
Qty: 90 TABLET | Refills: 0 | Status: SHIPPED | OUTPATIENT
Start: 2024-02-21 | End: 2024-03-15 | Stop reason: DRUGHIGH

## 2024-02-21 NOTE — PROGRESS NOTES
Chief complaint:   Chief Complaint   Patient presents with    Follow-up     1 week follow up for upper leg and arm pain        HPI:  Kristin Charles is a 78 y.o. female who presents for evaluation of her recent sudden onset of shoulder and hip pain which has been debilitating, lasting longer then 2 weeks with elevated CRP. She was very responsive to Methylprednisolone for which she did not take the dose pack as prescribed and is only taking 1, 4 mg tablet with resolution of her sx. She has tried not taking and her sx return and she has difficulty getting up and ambulating. She has never had sx like this before. She has no headache, vision change, ill feelings. She has not been sick recently. She is not on a statin medication.     She is waiting to meet with surgeons for possible TAVR but has had many appointments changed and cancelled on her. She is quite frustrated with the process and anxious about next steps. She has completed all testing asked of her. She is interested in second opinion.     Physical exam:  /68   Temp 35.8 °C (96.4 °F)   General: NAD, well appearing female  Heart: RRR, systolic murmur appreciated greatest right sternal border  Lungs: CTAB, no wheezes, rales, rhonchi  Abdomen: soft, non tender, normoactive BS, no organomegaly  Extremities: No LE edema    Assessment/Plan   Problem List Items Addressed This Visit    None  Visit Diagnoses       PMR (polymyalgia rheumatica) (CMS/[facility])    -  Primary    Relevant Medications    predniSONE (Deltasone) 5 mg tablet    Other Relevant Orders    Referral to Rheumatology    C-reactive protein    Sedimentation Rate        Discussed based on sx, PMR is likely and has been responsive to steroid treatment for the past week. Will treat with Prednisone 15 mg PO daily for 4-6 weeks and refer to rheumatology. Discussed she will need a taper and not to stop the Prednisone abruptly after having been on it for a week. Repeat labs in 4 weeks prior to her follow up  with me. Will need to discuss monitoring for osteoporosis, cataracts, glaucoma, and blood pressure while on the steroid. Tapering will likely last 1-2 years with small decreases in dose every 2-4 weeks.    Will provide recommendations for cardiac surgeon through  for second opinion.    Melanie Muñoz, DO

## 2024-03-05 ENCOUNTER — TELEMEDICINE (OUTPATIENT)
Dept: CARDIOLOGY | Facility: HOSPITAL | Age: 79
End: 2024-03-05
Payer: MEDICARE

## 2024-03-05 DIAGNOSIS — I50.22 CHRONIC SYSTOLIC HEART FAILURE (MULTI): ICD-10-CM

## 2024-03-05 DIAGNOSIS — I35.1 MODERATE AORTIC REGURGITATION: ICD-10-CM

## 2024-03-05 DIAGNOSIS — I35.0 MODERATE TO SEVERE AORTIC STENOSIS: Primary | ICD-10-CM

## 2024-03-05 PROCEDURE — 1036F TOBACCO NON-USER: CPT | Performed by: INTERNAL MEDICINE

## 2024-03-05 PROCEDURE — 1126F AMNT PAIN NOTED NONE PRSNT: CPT | Performed by: INTERNAL MEDICINE

## 2024-03-05 PROCEDURE — 99205 OFFICE O/P NEW HI 60 MIN: CPT | Performed by: INTERNAL MEDICINE

## 2024-03-05 PROCEDURE — 1159F MED LIST DOCD IN RCRD: CPT | Performed by: INTERNAL MEDICINE

## 2024-03-05 PROCEDURE — 1160F RVW MEDS BY RX/DR IN RCRD: CPT | Performed by: INTERNAL MEDICINE

## 2024-03-05 NOTE — PROGRESS NOTES
"Referred by PCP Dr. Melanie Muñoz      History Of Present Illness:    Edel Charles is a 78 y.o. female presenting second opinion for mixed aortic valve disease evaluation.     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 She was noted to have have aortic valve disease during work up for a murmur heard at her PCP's office.   Transthoracic echocardiogram ( per report, images unavailable for review)  Independent with ADLs, still drives, she is active and completes household chores indoors and outdoors without limitations. No chest pain, no syncope or presyncope, no orthopnea, no PND, no leg swelling.    Recent prednisone for arthralgias which has helped and expected to take for several weeks prior to visit with rheumatologist in April    She was evaluated at [facility] and deemed to be moderate/severe she preferred to wait for symptoms to occur. She was also being triaged with brain natiuretic peptide given symptom burden which was found to be elevated. Transesopheageal echocardiogram is pending. However she decided to seek a second opinion    EKG 05/15/2023- HR 60bpm, LAFB.   Cath [facility] 11/27/2023 ( images reviewed in [facility]) - No obstructive CAD  TTE ( per report) LVEF 38% +/- 5% normal RV size and function, Moderate aortic regurgitation, moderate aortic stenosis KAREEM 1.02cm2 ( AVAi 0.56cm2/m2 => severe), mean gradient 15 mmHg, DI 0.33  CTA 1/2024 - AVCS per report - 247 Agaston unit \"likely underestimated relative to standard threshold 130 HU\"   NM SPECT for amyloid - Not consistent with TTR amyloidosis  Dental clearance - completed  PFTs 1/2024 - Mld obstruction, normal diffusion capacity    PAST MEDICAL HISTORY  HFrEF, LVEF 35-40% ( TTE 2/9/2024)  Hypertension  Hyperlipidemia  Hypothyroidism  LF-LG Aortic stenosis  Bilateral total knee arthroplasties  Arthralgias on prednisone    Past Medical History:  She has a past medical history of Actinic keratosis, Acute candidiasis of vulva and " vagina, Acute laryngitis (03/20/2018), Baker's cyst of knee (04/06/2023), Bitten or stung by nonvenomous insect and other nonvenomous arthropods, initial encounter (09/02/2015), Candidiasis, unspecified (10/13/2017), Chronic ethmoidal sinusitis (11/24/2020), Chronic maxillary sinusitis (08/06/2020), Disease of thyroid gland, Eczema, Heart murmur, Heart valve disease, Laceration without foreign body of unspecified forearm, initial encounter (01/09/2022), Lumbago with sciatica, left side (03/29/2019), 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), Nail disorder, unspecified (08/08/2023), Neoplasm of uncertain behavior of skin (08/08/2023), Obesity (01/19/2015), Onychomycosis (04/06/2023), Open wound of hip and thigh with tendon involvement (05/24/2018), Overweight (BMI 25.0-29.9) (04/06/2023), Personal history of diseases of the skin and subcutaneous tissue (01/09/2022), Personal history of diseases of the skin and subcutaneous tissue (09/02/2015), Personal history of other diseases of the musculoskeletal system and connective tissue, Personal history of other diseases of the nervous system and sense organs, Personal history of other diseases of the nervous system and sense organs (09/20/2019), Personal history of other endocrine, nutritional and metabolic disease, Personal history of other infectious and parasitic diseases (09/23/2017), Personal history of other specified conditions (03/06/2020), Rash and other nonspecific skin eruption (06/15/2015), Rhinitis (10 years), Sciatica (12/11/2014), 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    Past Surgical History:  She has a past surgical history that includes Cataract extraction (06/30/2014); Other surgical history  (01/09/2022); Tonsillectomy; Dilation and curettage of uterus; Total knee arthroplasty; Joint replacement; and Skin biopsy.      Social History:  She reports that she has quit smoking. Her smoking use included cigarettes. She has never been exposed to tobacco smoke. She has never used smokeless tobacco. She reports that she does not drink alcohol and does not use drugs.    Family History:  Family History   Problem Relation Name Age of Onset    Cancer Sister Kaila         Allergies:  Morphine, Other, Sulfa (sulfonamide antibiotics), Miconazole, and Neomycin-bacitracin-polymyxin    Outpatient Medications:  Current Outpatient Medications   Medication Instructions    aspirin 81 mg, oral, Daily RT    biotin 10 mg tablet 1 tablet, oral, Daily    calcium carbonate-vitamin D3 600 mg-5 mcg (200 unit) tablet 1 tablet, oral, 2 times daily    celecoxib (CeleBREX) 200 mg capsule 1 capsule, oral, Daily, With a meal    ciclopirox (Penlac) 8 % solution Apply to the nails once daily, remove with isopropyl alcohol once weekly    fluticasone (Flonase) 50 mcg/actuation nasal spray 2 sprays, Each Nostril, Daily    ibuprofen (MOTRIN) 200 mg, oral    levothyroxine (SYNTHROID, LEVOXYL) 125 mcg, oral, Daily    losartan (COZAAR) 25 mg, oral, Daily RT    mometasone (Elocon) 0.1 % lotion Use 3 drops topically to affected ear PRN itching    multivitamin-min-iron-FA-vit K 45 mg iron- 800 mcg-120 mcg capsule 1 capsule, oral, Daily    omega 3-dha-epa-fish oil (Fish OiL) 1,000 mg (120 mg-180 mg) capsule 120 mg, oral, Daily RT    pantoprazole (PROTONIX) 20 mg, oral, Daily    predniSONE (DELTASONE) 15 mg, oral, Daily    traZODone (Desyrel) 50 mg tablet 1 tablet, oral, Nightly PRN        Last Recorded Vitals:  There were no vitals filed for this visit.    Physical Exam:  VIDEO VISIT     Last Labs:  CBC -  Lab Results   Component Value Date    WBC 6.2 02/06/2024    HGB 14.2 02/06/2024    HCT 44.3 02/06/2024    MCV 95 02/06/2024      02/06/2024       CMP -  Lab Results   Component Value Date    CALCIUM 9.5 02/06/2024    PHOS 3.3 10/07/2022    PROT 6.6 02/06/2024    ALBUMIN 4.2 02/06/2024    AST 14 02/06/2024    ALT 11 02/06/2024    ALKPHOS 56 02/06/2024    BILITOT 0.7 02/06/2024       LIPID PANEL -   Lab Results   Component Value Date    CHOL 171 11/15/2023    TRIG 136 11/15/2023    HDL 53.6 11/15/2023    CHHDL 3.2 11/15/2023    LDLF 77 11/05/2021    VLDL 27 11/15/2023    NHDL 117 11/15/2023       RENAL FUNCTION PANEL -   Lab Results   Component Value Date    GLUCOSE 161 (H) 02/06/2024     02/06/2024    K 4.6 02/06/2024     02/06/2024    CO2 26 02/06/2024    ANIONGAP 16 02/06/2024    BUN 13 02/06/2024    CREATININE 0.87 02/06/2024    GFRMALE CANCELED 05/27/2023    CALCIUM 9.5 02/06/2024    PHOS 3.3 10/07/2022    ALBUMIN 4.2 02/06/2024        Lab Results   Component Value Date    HGBA1C 7.2 (A) 12/27/2023       ASSESSMENT AND PLAN  Moderate to severe aortic stenosis  Moderate aortic regurgitation  HFrEF, LVEF 40% secondary to non-ischemic cardiomyopathy      Ms Charles has asymptomatic mixed aortic valve disease of unclear severity ( reported moderate to severe mixed aortic valve). This is associated with elevated brain natiuretic peptide.     We will review her images when transferred. The nuclear study and the transthoracic echocardiogram have discrepant LVEF assessments.   If she does indeed has low LVEF then consideration for dobutamine stress echocardiogram if the rest of the studies are equivocal.    She has been encouraged to complete the transesophageal echocardiogram as scheduled    We will follow with results of testing to ascertain anatomic suitability, appropriateness and benefit of interventions as well as need for heart failure medication optimization for cardiomyopathy      Thank you for the opportunity to participate in her care.      Oli Lazaro MD

## 2024-03-08 ENCOUNTER — APPOINTMENT (OUTPATIENT)
Dept: CARDIAC SURGERY | Facility: HOSPITAL | Age: 79
End: 2024-03-08
Payer: MEDICARE

## 2024-03-11 ENCOUNTER — APPOINTMENT (OUTPATIENT)
Dept: CARDIOLOGY | Facility: HOSPITAL | Age: 79
End: 2024-03-11
Payer: MEDICARE

## 2024-03-13 ENCOUNTER — TELEPHONE (OUTPATIENT)
Dept: PRIMARY CARE | Facility: CLINIC | Age: 79
End: 2024-03-13
Payer: MEDICARE

## 2024-03-13 NOTE — TELEPHONE ENCOUNTER
Patient is not able to get into rheumatology until 4/4. Patient states her arm pain is still pretty bad. She is wondering if she should continue the prednisone that long? Or if she needs to try and get in sooner with rheumatology?

## 2024-03-14 ENCOUNTER — APPOINTMENT (OUTPATIENT)
Dept: CARDIAC SURGERY | Facility: HOSPITAL | Age: 79
End: 2024-03-14
Payer: MEDICARE

## 2024-03-15 ENCOUNTER — OFFICE VISIT (OUTPATIENT)
Dept: PRIMARY CARE | Facility: CLINIC | Age: 79
End: 2024-03-15
Payer: MEDICARE

## 2024-03-15 VITALS — TEMPERATURE: 97.6 F | BODY MASS INDEX: 30.49 KG/M2 | WEIGHT: 161.38 LBS

## 2024-03-15 DIAGNOSIS — M35.3 PMR (POLYMYALGIA RHEUMATICA) (MULTI): ICD-10-CM

## 2024-03-15 DIAGNOSIS — N18.30 STAGE 3 CHRONIC KIDNEY DISEASE, UNSPECIFIED WHETHER STAGE 3A OR 3B CKD (MULTI): ICD-10-CM

## 2024-03-15 DIAGNOSIS — K21.9 GASTROESOPHAGEAL REFLUX DISEASE, UNSPECIFIED WHETHER ESOPHAGITIS PRESENT: ICD-10-CM

## 2024-03-15 DIAGNOSIS — M25.50 ARTHRALGIA, UNSPECIFIED JOINT: Primary | ICD-10-CM

## 2024-03-15 PROCEDURE — 99214 OFFICE O/P EST MOD 30 MIN: CPT | Performed by: FAMILY MEDICINE

## 2024-03-15 PROCEDURE — 1160F RVW MEDS BY RX/DR IN RCRD: CPT | Performed by: FAMILY MEDICINE

## 2024-03-15 PROCEDURE — 1036F TOBACCO NON-USER: CPT | Performed by: FAMILY MEDICINE

## 2024-03-15 PROCEDURE — 1159F MED LIST DOCD IN RCRD: CPT | Performed by: FAMILY MEDICINE

## 2024-03-15 RX ORDER — METOPROLOL SUCCINATE 25 MG/1
25 TABLET, EXTENDED RELEASE ORAL DAILY
COMMUNITY
End: 2024-03-21 | Stop reason: WASHOUT

## 2024-03-15 RX ORDER — PREDNISONE 5 MG/1
15 TABLET ORAL DAILY
Qty: 90 TABLET | Refills: 0 | Status: SHIPPED | OUTPATIENT
Start: 2024-03-15 | End: 2024-04-02 | Stop reason: SDUPTHER

## 2024-03-15 RX ORDER — PANTOPRAZOLE SODIUM 40 MG/1
40 TABLET, DELAYED RELEASE ORAL DAILY
Qty: 30 TABLET | Refills: 1 | Status: SHIPPED
Start: 2024-03-15 | End: 2024-05-21

## 2024-03-15 NOTE — PROGRESS NOTES
Chief complaint:   Chief Complaint   Patient presents with    chronic pain in upper arms and legs       HPI:  Kristin Charles is a 78 y.o. female who presents for evaluation of PMR. She was concerned regarding taking 15 mg of Prednisone and so has been taking 10 mg (tried 5 mg but it did not work) with Celebrex and or Tyelnol but still has pain when getting up. Pain is localized to the shoulders and hips.     To note she has had some increased heartburn since starting the Prednisone as well as constipation.     She is still in the process of determining when and who and if she will be getting a TVAR.    Physical exam:  Temp 36.4 °C (97.6 °F) (Oral)   Wt 73.2 kg (161 lb 6 oz)   BMI 30.49 kg/m²   General: NAD, well appearing female  Heart: RRR  Lungs: CTAB, no wheezes, rales, rhonchi  Abdomen: soft, mild tenderness LUQ to palpation, no rebound or guarding    Assessment/Plan   Problem List Items Addressed This Visit       CKD (chronic kidney disease) stage 3, GFR 30-59 ml/min (CMS/[facility])    Relevant Orders    Vitamin D 25-Hydroxy,Total (for eval of Vitamin D levels)    GERD (gastroesophageal reflux disease)    Relevant Medications    pantoprazole (ProtoNix) 40 mg EC tablet     Other Visit Diagnoses       Arthralgia, unspecified joint    -  Primary    Relevant Orders    Sedimentation Rate    C-reactive protein    Citrulline Antibody, IgG    Rheumatoid factor    Vitamin D 25-Hydroxy,Total (for eval of Vitamin D levels)    CBC and Auto Differential    PMR (polymyalgia rheumatica) (CMS/[facility])        Relevant Medications    predniSONE (Deltasone) 5 mg tablet    Other Relevant Orders    Comprehensive metabolic panel        Discussed the importance of getting her pain under control before decreasing the dose which will be done very slowly every 2-4 weeks after obtaining resolution of sx.  Recommend assessing inflammatory markers every 3 mo, sooner as needed. Taper will be decreased by 2.5 mg every 2-4 weeks until 10 mg  prednisone then 1 mg increments from there monthly unless something changes. She does have rheumatology evaluation scheduled as well. She will increase the Prednisone again to 15 mg and continue this dose until seen back in 2-4 weeks though is to call if sx not resolved in 1 week. I will treat the increased heartburn with increase in Pantoprazole from 20 mg to 40 mg PO daily. She will follow up in 2-4 weeks with me and obtain fasting labs tomorrow am.     Melanie Muñoz, DO

## 2024-03-16 ENCOUNTER — LAB (OUTPATIENT)
Dept: LAB | Facility: LAB | Age: 79
End: 2024-03-16
Payer: MEDICARE

## 2024-03-16 DIAGNOSIS — M25.50 ARTHRALGIA, UNSPECIFIED JOINT: ICD-10-CM

## 2024-03-16 DIAGNOSIS — N18.30 STAGE 3 CHRONIC KIDNEY DISEASE, UNSPECIFIED WHETHER STAGE 3A OR 3B CKD (MULTI): ICD-10-CM

## 2024-03-16 DIAGNOSIS — E03.9 HYPOTHYROIDISM, UNSPECIFIED TYPE: ICD-10-CM

## 2024-03-16 DIAGNOSIS — M35.3 PMR (POLYMYALGIA RHEUMATICA) (MULTI): ICD-10-CM

## 2024-03-16 LAB
25(OH)D3 SERPL-MCNC: 29 NG/ML (ref 30–100)
ALBUMIN SERPL BCP-MCNC: 3.6 G/DL (ref 3.4–5)
ALP SERPL-CCNC: 57 U/L (ref 33–136)
ALT SERPL W P-5'-P-CCNC: 15 U/L (ref 7–45)
ANION GAP SERPL CALC-SCNC: 13 MMOL/L (ref 10–20)
AST SERPL W P-5'-P-CCNC: 12 U/L (ref 9–39)
BASOPHILS # BLD AUTO: 0.05 X10*3/UL (ref 0–0.1)
BASOPHILS NFR BLD AUTO: 0.8 %
BILIRUB SERPL-MCNC: 0.7 MG/DL (ref 0–1.2)
BUN SERPL-MCNC: 19 MG/DL (ref 6–23)
CALCIUM SERPL-MCNC: 9.2 MG/DL (ref 8.6–10.6)
CHLORIDE SERPL-SCNC: 101 MMOL/L (ref 98–107)
CO2 SERPL-SCNC: 29 MMOL/L (ref 21–32)
CREAT SERPL-MCNC: 0.89 MG/DL (ref 0.5–1.05)
CRP SERPL-MCNC: 1.08 MG/DL
EGFRCR SERPLBLD CKD-EPI 2021: 66 ML/MIN/1.73M*2
EOSINOPHIL # BLD AUTO: 0.11 X10*3/UL (ref 0–0.4)
EOSINOPHIL NFR BLD AUTO: 1.8 %
ERYTHROCYTE [DISTWIDTH] IN BLOOD BY AUTOMATED COUNT: 13.4 % (ref 11.5–14.5)
ERYTHROCYTE [SEDIMENTATION RATE] IN BLOOD BY WESTERGREN METHOD: 10 MM/H (ref 0–30)
GLUCOSE SERPL-MCNC: 305 MG/DL (ref 74–99)
HCT VFR BLD AUTO: 43.9 % (ref 36–46)
HGB BLD-MCNC: 14 G/DL (ref 12–16)
IMM GRANULOCYTES # BLD AUTO: 0.02 X10*3/UL (ref 0–0.5)
IMM GRANULOCYTES NFR BLD AUTO: 0.3 % (ref 0–0.9)
LYMPHOCYTES # BLD AUTO: 2.35 X10*3/UL (ref 0.8–3)
LYMPHOCYTES NFR BLD AUTO: 37.7 %
MCH RBC QN AUTO: 29.7 PG (ref 26–34)
MCHC RBC AUTO-ENTMCNC: 31.9 G/DL (ref 32–36)
MCV RBC AUTO: 93 FL (ref 80–100)
MONOCYTES # BLD AUTO: 0.49 X10*3/UL (ref 0.05–0.8)
MONOCYTES NFR BLD AUTO: 7.9 %
NEUTROPHILS # BLD AUTO: 3.22 X10*3/UL (ref 1.6–5.5)
NEUTROPHILS NFR BLD AUTO: 51.5 %
NRBC BLD-RTO: 0 /100 WBCS (ref 0–0)
PLATELET # BLD AUTO: 158 X10*3/UL (ref 150–450)
POTASSIUM SERPL-SCNC: 4.4 MMOL/L (ref 3.5–5.3)
PROT SERPL-MCNC: 6.1 G/DL (ref 6.4–8.2)
RBC # BLD AUTO: 4.72 X10*6/UL (ref 4–5.2)
RHEUMATOID FACT SER NEPH-ACNC: <10 IU/ML (ref 0–15)
SODIUM SERPL-SCNC: 139 MMOL/L (ref 136–145)
TSH SERPL-ACNC: 1.86 MIU/L (ref 0.44–3.98)
WBC # BLD AUTO: 6.2 X10*3/UL (ref 4.4–11.3)

## 2024-03-16 PROCEDURE — 36415 COLL VENOUS BLD VENIPUNCTURE: CPT

## 2024-03-16 PROCEDURE — 86431 RHEUMATOID FACTOR QUANT: CPT

## 2024-03-16 PROCEDURE — 82306 VITAMIN D 25 HYDROXY: CPT

## 2024-03-16 PROCEDURE — 86140 C-REACTIVE PROTEIN: CPT

## 2024-03-16 PROCEDURE — 85652 RBC SED RATE AUTOMATED: CPT

## 2024-03-16 PROCEDURE — 85025 COMPLETE CBC W/AUTO DIFF WBC: CPT

## 2024-03-16 PROCEDURE — 84443 ASSAY THYROID STIM HORMONE: CPT

## 2024-03-16 PROCEDURE — 86200 CCP ANTIBODY: CPT

## 2024-03-16 PROCEDURE — 80053 COMPREHEN METABOLIC PANEL: CPT

## 2024-03-17 LAB — CCP IGG SERPL-ACNC: <1 U/ML

## 2024-03-21 ENCOUNTER — OFFICE VISIT (OUTPATIENT)
Dept: CARDIOLOGY | Facility: CLINIC | Age: 79
End: 2024-03-21
Payer: MEDICARE

## 2024-03-21 VITALS
HEIGHT: 62 IN | SYSTOLIC BLOOD PRESSURE: 130 MMHG | OXYGEN SATURATION: 98 % | BODY MASS INDEX: 29.81 KG/M2 | WEIGHT: 162 LBS | DIASTOLIC BLOOD PRESSURE: 75 MMHG | HEART RATE: 67 BPM

## 2024-03-21 DIAGNOSIS — E11.9 DIABETES MELLITUS TYPE II, NON INSULIN DEPENDENT (MULTI): ICD-10-CM

## 2024-03-21 DIAGNOSIS — R00.2 PALPITATIONS: ICD-10-CM

## 2024-03-21 DIAGNOSIS — I10 PRIMARY HYPERTENSION: ICD-10-CM

## 2024-03-21 DIAGNOSIS — I35.1 NONRHEUMATIC AORTIC VALVE INSUFFICIENCY: Primary | ICD-10-CM

## 2024-03-21 DIAGNOSIS — I35.0 NONRHEUMATIC AORTIC VALVE STENOSIS: ICD-10-CM

## 2024-03-21 DIAGNOSIS — I50.22 HEART FAILURE WITH MID-RANGE EJECTION FRACTION (MULTI): ICD-10-CM

## 2024-03-21 PROCEDURE — 3075F SYST BP GE 130 - 139MM HG: CPT | Performed by: INTERNAL MEDICINE

## 2024-03-21 PROCEDURE — 93000 ELECTROCARDIOGRAM COMPLETE: CPT | Performed by: INTERNAL MEDICINE

## 2024-03-21 PROCEDURE — 3078F DIAST BP <80 MM HG: CPT | Performed by: INTERNAL MEDICINE

## 2024-03-21 PROCEDURE — 1159F MED LIST DOCD IN RCRD: CPT | Performed by: INTERNAL MEDICINE

## 2024-03-21 PROCEDURE — 1160F RVW MEDS BY RX/DR IN RCRD: CPT | Performed by: INTERNAL MEDICINE

## 2024-03-21 PROCEDURE — 1036F TOBACCO NON-USER: CPT | Performed by: INTERNAL MEDICINE

## 2024-03-21 PROCEDURE — 99215 OFFICE O/P EST HI 40 MIN: CPT | Performed by: INTERNAL MEDICINE

## 2024-03-21 RX ORDER — METOPROLOL SUCCINATE 25 MG/1
25 TABLET, EXTENDED RELEASE ORAL DAILY
Qty: 90 TABLET | Refills: 3 | Status: SHIPPED | OUTPATIENT
Start: 2024-03-21 | End: 2025-03-21

## 2024-03-21 RX ORDER — METOPROLOL SUCCINATE 25 MG/1
25 TABLET, EXTENDED RELEASE ORAL DAILY
Qty: 90 TABLET | Refills: 3 | Status: CANCELLED
Start: 2024-03-21

## 2024-03-21 NOTE — PATIENT INSTRUCTIONS
"It was my pleasure to meet you.  I look forward to being your cardiologist.  I am a huge believer in communicating with my patients.  Please contact me at any time, if anything is not clear to you regarding anything we have discussed, or if new questions occur to you.     You should increase your intake of fresh fruits and vegetables.  Try to consume 9-12 servings per day of such foods.  You should increase your intake of deep sea fish such as salmon and tuna.  Try to get two servings per week of fish, but if you are a pregnant woman, talk to your obstetrician before increasing your fish intake.  You should increase your intake of unprocessed nuts such as walnuts or almonds.  Increase your intake of plant-based protein.  You should avoid fried foods.  Don't consume sugary or starchy foods and sugary drinks.  Avoid saturated fats.  Try not to dine at restaurants more than once per month, and don't dine at fast food places.  Try to get 7-9 hours of sleep every night.  Try to get 150 minutes per week of moderate intensity exercise (after I have cleared you to start an exercise program).  Try to maintain the appropriate weight for your height based on body mass index (BMI). Maintain your cholesterol, blood sugar, and blood pressure in the recommended respective normal ranges.  There is a wealth of information on the American [employer] Association's website regarding this.  Just Google \"Life's Essential 8\" for more information.   Ask me about any of these details  if you have questions.    As your cardiologist, I will be available to you at any time to answer any question you have concerning your heart health.  My staff, Yehuda can also answer any questions you may have.  Best of luck.   "

## 2024-03-21 NOTE — PROGRESS NOTES
"Chief Complaint:   please see below     History Of Present Illness:    Kristin Charles \"Crescencio" is a 78 y.o. female presenting with aortic regurgitation, aortic stenosis, heart failure with mid range ejection fraction.    I am seeing this hypertensive, diabetic, woman at her request for a second opinion regarding aortic stenosis and aortic regurgitation.  Her primary care physician is Dr. Melanie Muñoz.  Her primary cardiologist is Dr. Hoover at the [facility].    She first saw Dr. Mccrary in May 2023 for preoperative cardiac evaluation prior to knee replacement surgery because of an abnormal EKG.  He assessed her and cleared her for knee replacement surgery which she underwent without cardiac issues or problems.  He suggested an echocardiogram be performed.      The echo was eventually done in November 2023 at the [facility].  This study disclosed an ejection fraction of 40% with no left ventricular dilatation, a peak velocity across aortic valve of 2.54 m/s, peak aortic gradient of 26 mm, mean aortic gradient of 15 mm, dimensionless index of 0.33, aortic valve area of 1.0 cm² (0.56 cm²/m², and 2+ aortic regurgitation.  Following her echocardiogram, she saw Dr. Hoover who ordered further testing including a cardiac catheterization and referral to the [facility] TAVR team.  Her cath in November 2023 revealed no significant coronary stenosis.  A CT angio in January 2024 disclosed an aortic valve calcium score of 247.  An amyloid SPECT in January 2024 was negative for TTR amyloidosis.  She saw Dr. Greer of cardiac surgery on February 23, 2024.  His assessment was that the patient had minimal symptoms and should be reevaluated in 6 months.  N-terminal proBNP was elevated at 1289 on February 29, 2024.      She saw Dr. Lazaro by way of virtual visit on March 5, 2024, prior to the MICKEY.  There was discussion of a dobutamine stress echo for assessment of low output low gradient aortic stenosis at " "that time.   She underwent MICKEY on 2024 disclosing mild left ventricular dysfunction, moderately severe aortic stenosis with an aortic valve area of 1.0 cm², 3+ aortic regurgitation.    From a cardiac perspective she is quite active for her age.  She swims at the [employer] for 30 minutes once per week.  She actually swims laps in the pool, and feels well while she is doing so, denying anginal symptoms or anginal equivalent.  For the past 10 years, she has been aware of an unusual feeling in her chest that occurs about every 2 weeks or so.  The symptoms average 30 seconds in duration and resolve on their own.  The symptoms may be worse with activity.  Nonetheless she has no difficulty doing her household chores such as vacuuming, cooking, cleaning, and shopping.  In answering my questions, she acknowledged that she may be more acutely aware of chest symptoms because her daughter  suddenly in her sleep presumptively of a myocardial infarction.  On occasion she has experienced palpitations that feel like an occasional fluttering in her chest.  These symptoms are not exertional and resolve within seconds.    The patient has no prior history of rheumatic fever, she has never taken weight loss medications or supplements, she denies symptoms of fever, chills, night sweats, and has not had unintentional weight loss.    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    Last Recorded Vitals:  Vitals:    24 1329   BP: 130/75   BP Location: Right arm   Patient Position: Sitting   Pulse: 67   SpO2: 98%   Weight: 73.5 kg (162 lb)   Height: 1.575 m (5' 2\")       Past Medical History:  She has a past medical history of Actinic keratosis, Acute candidiasis of vulva and vagina, Acute laryngitis (2018), Baker's cyst of knee (2023), Bitten or stung by nonvenomous insect and other nonvenomous arthropods, initial encounter (2015), " Candidiasis, unspecified (10/13/2017), Chronic ethmoidal sinusitis (11/24/2020), Chronic maxillary sinusitis (08/06/2020), Disease of thyroid gland, Eczema, Heart murmur, Heart valve disease, Laceration without foreign body of unspecified forearm, initial encounter (01/09/2022), Lumbago with sciatica, left side (03/29/2019), 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), Nail disorder, unspecified (08/08/2023), Neoplasm of uncertain behavior of skin (08/08/2023), Obesity (01/19/2015), Onychomycosis (04/06/2023), Open wound of hip and thigh with tendon involvement (05/24/2018), Overweight (BMI 25.0-29.9) (04/06/2023), Personal history of diseases of the skin and subcutaneous tissue (01/09/2022), Personal history of diseases of the skin and subcutaneous tissue (09/02/2015), Personal history of other diseases of the musculoskeletal system and connective tissue, Personal history of other diseases of the nervous system and sense organs, Personal history of other diseases of the nervous system and sense organs (09/20/2019), Personal history of other endocrine, nutritional and metabolic disease, Personal history of other infectious and parasitic diseases (09/23/2017), Personal history of other specified conditions (03/06/2020), Rash and other nonspecific skin eruption (06/15/2015), Rhinitis (10 years), Sciatica (12/11/2014), 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    Past Surgical History:  She has a past surgical history that includes Cataract extraction (06/30/2014); Other surgical history (01/09/2022); Tonsillectomy; Dilation and curettage of uterus; Total knee arthroplasty; Joint replacement; and Skin biopsy.      Social History:  She reports that she has quit smoking.  Her smoking use included cigarettes. She has never been exposed to tobacco smoke. She has never used smokeless tobacco. She reports that she does not drink alcohol and does not use drugs.    Family History:  Family History   Problem Relation Name Age of Onset    Cancer Sister Kaila         Allergies:  Morphine, Other, Sulfa (sulfonamide antibiotics), Miconazole, and Neomycin-bacitracin-polymyxin    Outpatient Medications:  Current Outpatient Medications   Medication Instructions    aspirin 81 mg, oral, Daily RT    biotin 10 mg tablet 1 tablet, oral, Daily    calcium carbonate-vitamin D3 600 mg-5 mcg (200 unit) tablet 1 tablet, oral, 2 times daily    celecoxib (CeleBREX) 200 mg capsule 1 capsule, oral, Daily, With a meal    empagliflozin (JARDIANCE) 10 mg, oral, Daily    fluticasone (Flonase) 50 mcg/actuation nasal spray 2 sprays, Each Nostril, Daily    levothyroxine (SYNTHROID, LEVOXYL) 125 mcg, oral, Daily    metoprolol succinate XL (TOPROL-XL) 25 mg, oral, Daily, Do not crush or chew.    mometasone (Elocon) 0.1 % lotion Use 3 drops topically to affected ear PRN itching    multivitamin-min-iron-FA-vit K 45 mg iron- 800 mcg-120 mcg capsule 1 capsule, oral, Daily    omega 3-dha-epa-fish oil (Fish OiL) 1,000 mg (120 mg-180 mg) capsule 120 mg, oral, Daily RT    pantoprazole (PROTONIX) 40 mg, oral, Daily, Do not crush, chew, or split.    predniSONE (DELTASONE) 15 mg, oral, Daily    sacubitriL-valsartan (Entresto) 24-26 mg tablet 1 tablet, oral, Daily    traZODone (Desyrel) 50 mg tablet 1 tablet, oral, Nightly PRN       Physical Exam:  GENERAL:  pleasant 78 year-old  HEENT: No xanthelasma  NECK: Supple, no palpable adenopathy or thyromegaly  CHEST: Clear to auscultation, respiratory effort unlabored  CARDIAC: RRR, normal S1 and S2, no audible rub, gallop, carotids are brisk, PMI is not displaced there is a 3/6 crescendo decrescendo systolic murmur heard best at the RSB.  There is a grade 1 diastolic murmur heard best  at the RSB.  ABD: Active bowel sounds, nontender, no organomegaly, no evidence of ascites  EXT: No clubbing, cyanosis, edema, or tenderness  NEURO: Awake, alert, appropriate, speech is fluent       Last Labs:  CBC -  Lab Results   Component Value Date    WBC 6.2 03/16/2024    HGB 14.0 03/16/2024    HCT 43.9 03/16/2024    MCV 93 03/16/2024     03/16/2024       CMP -  Lab Results   Component Value Date    CALCIUM 9.2 03/16/2024    PHOS 3.3 10/07/2022    PROT 6.1 (L) 03/16/2024    ALBUMIN 3.6 03/16/2024    AST 12 03/16/2024    ALT 15 03/16/2024    ALKPHOS 57 03/16/2024    BILITOT 0.7 03/16/2024       LIPID PANEL -   Lab Results   Component Value Date    CHOL 171 11/15/2023    TRIG 136 11/15/2023    HDL 53.6 11/15/2023    CHHDL 3.2 11/15/2023    LDLF 77 11/05/2021    VLDL 27 11/15/2023    NHDL 117 11/15/2023       RENAL FUNCTION PANEL -   Lab Results   Component Value Date    GLUCOSE 305 (H) 03/16/2024     03/16/2024    K 4.4 03/16/2024     03/16/2024    CO2 29 03/16/2024    ANIONGAP 13 03/16/2024    BUN 19 03/16/2024    CREATININE 0.89 03/16/2024    GFRMALE CANCELED 05/27/2023    CALCIUM 9.2 03/16/2024    PHOS 3.3 10/07/2022    ALBUMIN 3.6 03/16/2024        Lab Results   Component Value Date    HGBA1C 7.2 (A) 12/27/2023         Lab review: I have Chemistry CMP:   Lab Results   Component Value Date    ALBUMIN 3.6 03/16/2024    CALCIUM 9.2 03/16/2024    CO2 29 03/16/2024    CREATININE 0.89 03/16/2024    GLUCOSE 305 (H) 03/16/2024    BILITOT 0.7 03/16/2024    PROT 6.1 (L) 03/16/2024    ALT 15 03/16/2024    AST 12 03/16/2024    ALKPHOS 57 03/16/2024   , Chemistry BMP   Lab Results   Component Value Date    GLUCOSE 305 (H) 03/16/2024    CALCIUM 9.2 03/16/2024    CO2 29 03/16/2024    CREATININE 0.89 03/16/2024   , and CBC:  Lab Results   Component Value Date    WBC 6.2 03/16/2024    RBC 4.72 03/16/2024    HGB 14.0 03/16/2024    HCT 43.9 03/16/2024    MCV 93 03/16/2024    MCH 29.7 03/16/2024    MCHC 31.9  (L) 03/16/2024    RDW 13.4 03/16/2024    NRBC 0.0 03/16/2024       Assessment/Plan   Problem List Items Addressed This Visit          Cardiac and Vasculature    Aortic valve stenosis    Relevant Medications    sacubitriL-valsartan (Entresto) 24-26 mg tablet    metoprolol succinate XL (Toprol-XL) 25 mg 24 hr tablet    Other Relevant Orders    ECG 12 lead (Clinic Performed)    Heart failure with mid-range ejection fraction (CMS/HCC)    Relevant Medications    sacubitriL-valsartan (Entresto) 24-26 mg tablet    empagliflozin (Jardiance) 10 mg    metoprolol succinate XL (Toprol-XL) 25 mg 24 hr tablet    Other Relevant Orders    Follow Up In Cardiology    Basic metabolic panel    Nonrheumatic aortic valve insufficiency - Primary    Relevant Medications    sacubitriL-valsartan (Entresto) 24-26 mg tablet    metoprolol succinate XL (Toprol-XL) 25 mg 24 hr tablet    Palpitations    Relevant Orders    Holter Or Event Cardiac Monitor    Primary hypertension       Endocrine/Metabolic    Diabetes mellitus type II, non insulin dependent (CMS/HCC)     This 78-year-old hypertensive, diabetic, hyperlipidemic woman without significant CAD on cath in November 2023 has, according to report, moderate (3+) aortic regurgitation and moderately severe aortic stenosis (aortic valve area by MICKEY planimetry = 1.0 cm² March 19, 2024; indexed aortic valve area = 0.56 cm²/m²).  She likely has a component of low flow, low gradient aortic stenosis with an ejection fraction of 40% by transthoracic echo at the [facility] in November 2023, confirmed to have mild left ventricular dysfunction by transesophageal echo on March 19, 2024, also at the [facility].  Aortic valve calcium score was 247 in January 2024.  She has heart failure with midrange ejection fraction.     Our approach:    1.  It is best to visually review images of testing done at the [facility].  When such images are available I will do so.  2.  Optimize heart failure  management by gradually titrating medical therapy:  -Entresto 24/26 one tab daily.  -Jardiance 10 mg daily  -Advised her to separate her Metoprolol, Entresto, and Jardiance.  -Provided her educational material on heart failure, diet etc.  -BMP in a few weeks.  -Follow up in four weeks.  3.  Once she is on optimally tolerated doses of guideline directed medical therapy we can reassess her EF, global and regional wall motion, and valvular function.  4.  There is no urgency about proceeding to TAVR based on her history, exam, and objective testing as described.    5.  Monitor study to assess her symptoms of palpitations.    Cruz Fang MD

## 2024-03-22 ENCOUNTER — DOCUMENTATION (OUTPATIENT)
Dept: CARDIOLOGY | Facility: CLINIC | Age: 79
End: 2024-03-22
Payer: MEDICARE

## 2024-03-22 ENCOUNTER — TELEPHONE (OUTPATIENT)
Dept: CARDIOLOGY | Facility: CLINIC | Age: 79
End: 2024-03-22
Payer: MEDICARE

## 2024-03-22 ENCOUNTER — TELEPHONE (OUTPATIENT)
Dept: PRIMARY CARE | Facility: CLINIC | Age: 79
End: 2024-03-22
Payer: MEDICARE

## 2024-03-22 ENCOUNTER — APPOINTMENT (OUTPATIENT)
Dept: CARDIAC SURGERY | Facility: HOSPITAL | Age: 79
End: 2024-03-22
Payer: MEDICARE

## 2024-03-22 DIAGNOSIS — I50.22 HEART FAILURE WITH MID-RANGE EJECTION FRACTION (MULTI): Primary | ICD-10-CM

## 2024-03-22 DIAGNOSIS — T38.0X5A STEROID-INDUCED DIABETES MELLITUS, INITIAL ENCOUNTER (MULTI): Primary | ICD-10-CM

## 2024-03-22 DIAGNOSIS — E09.9 STEROID-INDUCED DIABETES MELLITUS, INITIAL ENCOUNTER (MULTI): Primary | ICD-10-CM

## 2024-03-22 RX ORDER — METFORMIN HYDROCHLORIDE 500 MG/1
500 TABLET ORAL
Qty: 200 TABLET | Refills: 3 | Status: SHIPPED
Start: 2024-03-22 | End: 2024-03-22

## 2024-03-22 NOTE — TELEPHONE ENCOUNTER
No she can start with Jardiance and we can add Metformin or alternative if needed. I have cancelled the script.

## 2024-03-22 NOTE — TELEPHONE ENCOUNTER
I spoke with Pt and she states that she saw Dr. Fang yesterday and he prescribed her Entresto and Jardiance, per Pt. Dr. Fang feels that these medications will help her better than surgery, she is wondering if she should begin the Metformin along with the medications that Dr. Fang started her on?    She states that her pain is improving with the Celebrex and Prednisone.

## 2024-03-22 NOTE — TELEPHONE ENCOUNTER
Patient called this morning stating that the Jardiane and the Entresto that you want her to start taking are $47 each after her insurance. She said that is too much for her and that you told her to let you know and you would try to find some type of assistance for her. I do have [employer] Cards here for Jardiance but nothing for Entresto. Please advise. Thank you.

## 2024-03-22 NOTE — PROGRESS NOTES
I prescribed Entesto which is prohibitively expensive.  I have consulted [facility] for assistance.

## 2024-03-22 NOTE — TELEPHONE ENCOUNTER
Please inform patient that I have send Metformin 500 mg PO BID to her pharmacy.     Is her pain resolved? If not, we may need to increase the Steroid dose temporarily.

## 2024-03-25 DIAGNOSIS — I50.22 HEART FAILURE WITH MID-RANGE EJECTION FRACTION (MULTI): ICD-10-CM

## 2024-03-29 ENCOUNTER — APPOINTMENT (OUTPATIENT)
Dept: CARDIAC SURGERY | Facility: HOSPITAL | Age: 79
End: 2024-03-29
Payer: MEDICARE

## 2024-04-02 ENCOUNTER — TELEPHONE (OUTPATIENT)
Dept: PRIMARY CARE | Facility: CLINIC | Age: 79
End: 2024-04-02
Payer: MEDICARE

## 2024-04-02 DIAGNOSIS — M35.3 PMR (POLYMYALGIA RHEUMATICA) (MULTI): ICD-10-CM

## 2024-04-02 RX ORDER — PREDNISONE 5 MG/1
15 TABLET ORAL DAILY
Qty: 90 TABLET | Refills: 0 | Status: SHIPPED | OUTPATIENT
Start: 2024-04-02 | End: 2024-04-25 | Stop reason: SDUPTHER

## 2024-04-02 NOTE — TELEPHONE ENCOUNTER
Pt is calling in regards to her Prednisone 5 MG. Pt needs a refill but patient stated that her last appt on discuss decreasing her dosage. Pt wanted to know if you wanted to send her the same dose or the decreased dose and is asking if this can be sent to [facility]? Pt states that she is out of medication now. Pt is asking if someone can let her know which dose is sent and stated that if she doesn't answer it is okay to leave her message?     REFILL  MEDICATION:     Prednisone 5 MG; Take 3 tablets once daily.     PHARM: [facility]  PHARM NUMBER: [phone]    LR: 3/15/24     90 tablets with 0 refills  LV: 3/15/24  NV: 4/5/24

## 2024-04-02 NOTE — TELEPHONE ENCOUNTER
We can continue the 15 mg dosing until re-evaluated 4/5/2024. Do not skip days of this medication. I have sent in a new script today.     I called her and discussed this with her.

## 2024-04-04 ENCOUNTER — LAB (OUTPATIENT)
Dept: LAB | Facility: LAB | Age: 79
End: 2024-04-04
Payer: MEDICARE

## 2024-04-04 ENCOUNTER — OFFICE VISIT (OUTPATIENT)
Dept: RHEUMATOLOGY | Facility: CLINIC | Age: 79
End: 2024-04-04
Payer: MEDICARE

## 2024-04-04 VITALS
WEIGHT: 158 LBS | HEIGHT: 62 IN | BODY MASS INDEX: 29.08 KG/M2 | TEMPERATURE: 96.6 F | SYSTOLIC BLOOD PRESSURE: 105 MMHG | DIASTOLIC BLOOD PRESSURE: 66 MMHG | HEART RATE: 59 BPM

## 2024-04-04 DIAGNOSIS — Z79.52 CURRENT CHRONIC USE OF SYSTEMIC STEROIDS: ICD-10-CM

## 2024-04-04 DIAGNOSIS — M35.3 PMR (POLYMYALGIA RHEUMATICA) (MULTI): ICD-10-CM

## 2024-04-04 DIAGNOSIS — Z79.52 CURRENT CHRONIC USE OF SYSTEMIC STEROIDS: Primary | ICD-10-CM

## 2024-04-04 LAB — ERYTHROCYTE [SEDIMENTATION RATE] IN BLOOD BY WESTERGREN METHOD: 11 MM/H (ref 0–30)

## 2024-04-04 PROCEDURE — 1160F RVW MEDS BY RX/DR IN RCRD: CPT | Performed by: STUDENT IN AN ORGANIZED HEALTH CARE EDUCATION/TRAINING PROGRAM

## 2024-04-04 PROCEDURE — 36415 COLL VENOUS BLD VENIPUNCTURE: CPT

## 2024-04-04 PROCEDURE — 99214 OFFICE O/P EST MOD 30 MIN: CPT | Performed by: STUDENT IN AN ORGANIZED HEALTH CARE EDUCATION/TRAINING PROGRAM

## 2024-04-04 PROCEDURE — 1036F TOBACCO NON-USER: CPT | Performed by: STUDENT IN AN ORGANIZED HEALTH CARE EDUCATION/TRAINING PROGRAM

## 2024-04-04 PROCEDURE — 85652 RBC SED RATE AUTOMATED: CPT

## 2024-04-04 PROCEDURE — 3074F SYST BP LT 130 MM HG: CPT | Performed by: STUDENT IN AN ORGANIZED HEALTH CARE EDUCATION/TRAINING PROGRAM

## 2024-04-04 PROCEDURE — 3078F DIAST BP <80 MM HG: CPT | Performed by: STUDENT IN AN ORGANIZED HEALTH CARE EDUCATION/TRAINING PROGRAM

## 2024-04-04 PROCEDURE — 1159F MED LIST DOCD IN RCRD: CPT | Performed by: STUDENT IN AN ORGANIZED HEALTH CARE EDUCATION/TRAINING PROGRAM

## 2024-04-04 PROCEDURE — 86140 C-REACTIVE PROTEIN: CPT

## 2024-04-04 NOTE — PROGRESS NOTES
"Rheumatology New Outpatient  Note    Subjective   Kristin Charles \"Crescencio" is a 78 y.o. female presenting today for Joint Pain.    History of Presenting Problem:   Edel with PMHx of PVD, aortic stenosis, HFrEF, HTN, allergic rhinitis, DM, Hypothyroidism, GERD CKD, Anxiety, OA, and lumbar radiculopathy who is presenting today as a NP for PMR    Rheum history: started having shoulder/hip pain and stiffness around 1/2024 and her CRP was elevated. She was initially given a medrol adam. She continued to have symptoms and in 3/15/2024 and she was started on 15 mg daily. After 10 days she self reduced to 10 mg for 2 weeks then she was instructed to go back to 15 mg. She is overall feeling better but continues to have more pain and stiffness in the morning. No headache, jaw pain or scalp tenderness.    Past Medical History:   Past Medical History:   Diagnosis Date    Actinic keratosis     Acute candidiasis of vulva and vagina     Yeast infection of the vagina    Acute laryngitis 03/20/2018    Acute laryngitis    Lee's cyst of knee 04/06/2023    Bitten or stung by nonvenomous insect and other nonvenomous arthropods, initial encounter 09/02/2015    Bug bite with infection    Candidiasis, unspecified 10/13/2017    Yeast infection    Chronic ethmoidal sinusitis 11/24/2020    Chronic ethmoidal sinusitis    Chronic maxillary sinusitis 08/06/2020    Chronic maxillary sinusitis    Disease of thyroid gland     Eczema     Heart murmur     Heart valve disease     Laceration without foreign body of unspecified forearm, initial encounter 01/09/2022    Forearm laceration    Lumbago with sciatica, left side 03/29/2019    Chronic bilateral low back pain with left-sided sciatica    Lymphocytic colitis 05/05/2023    Colonoscopy: 4/24/2023    Nail disorder, unspecified 08/08/2023    Neoplasm of uncertain behavior of skin 08/08/2023    Obesity 01/19/2015    Onychomycosis 04/06/2023    Open wound of hip and thigh with tendon involvement " 05/24/2018    Overweight (BMI 25.0-29.9) 04/06/2023    Personal history of diseases of the skin and subcutaneous tissue 01/09/2022    History of contact dermatitis    Personal history of diseases of the skin and subcutaneous tissue 09/02/2015    History of cellulitis    Personal history of other diseases of the musculoskeletal system and connective tissue     Personal history of arthritis    Personal history of other diseases of the nervous system and sense organs     History of cataract    Personal history of other diseases of the nervous system and sense organs 09/20/2019    History of sciatica    Personal history of other endocrine, nutritional and metabolic disease     History of hypothyroidism    Personal history of other infectious and parasitic diseases 09/23/2017    History of erysipelas    Personal history of other specified conditions 03/06/2020    History of epistaxis    Rash and other nonspecific skin eruption 06/15/2015    Skin rash    Rhinitis 10 years    Sciatica 12/11/2014    Status post joint replacement 06/19/2023    Strain of muscle, fascia and tendon of the posterior muscle group at thigh level, unspecified thigh, initial encounter 05/01/2018    Hamstring strain    Tinnitus 10 years    Vaginal discharge 04/06/2023    Varus deformity, not elsewhere classified, unspecified knee 03/06/2019    Acquired genu varum       Allergies:   Allergies   Allergen Reactions    Morphine Unknown    Other Other    Sulfa (Sulfonamide Antibiotics) Unknown    Miconazole Rash    Neomycin-Bacitracin-Polymyxin Rash       Medications:   Current Outpatient Medications:     aspirin 81 mg EC tablet, Take 1 tablet (81 mg) by mouth once daily., Disp: , Rfl:     biotin 10 mg tablet, Take 1 tablet (10 mg) by mouth once daily., Disp: , Rfl:     calcium carbonate-vitamin D3 600 mg-5 mcg (200 unit) tablet, Take 1 tablet by mouth 2 times a day., Disp: , Rfl:     celecoxib (CeleBREX) 200 mg capsule, Take 1 capsule (200 mg) by mouth  once daily. With a meal, Disp: , Rfl:     empagliflozin (Jardiance) 10 mg, Take 1 tablet (10 mg) by mouth once daily., Disp: 30 tablet, Rfl: 11    fluticasone (Flonase) 50 mcg/actuation nasal spray, Administer 2 sprays into each nostril once daily., Disp: 16 g, Rfl: 3    levothyroxine (Synthroid, Levoxyl) 125 mcg tablet, Take 1 tablet (125 mcg) by mouth once daily., Disp: 30 tablet, Rfl: 11    metoprolol succinate XL (Toprol-XL) 25 mg 24 hr tablet, Take 1 tablet (25 mg) by mouth once daily. Do not crush or chew., Disp: 90 tablet, Rfl: 3    mometasone (Elocon) 0.1 % lotion, Use 3 drops topically to affected ear PRN itching, Disp: 60 mL, Rfl: 1    multivitamin-min-iron-FA-vit K 45 mg iron- 800 mcg-120 mcg capsule, Take 1 capsule by mouth once daily., Disp: , Rfl:     omega 3-dha-epa-fish oil (Fish OiL) 1,000 mg (120 mg-180 mg) capsule, Take 120 mg by mouth once daily., Disp: , Rfl:     pantoprazole (ProtoNix) 40 mg EC tablet, Take 1 tablet (40 mg) by mouth once daily. Do not crush, chew, or split., Disp: 30 tablet, Rfl: 1    predniSONE (Deltasone) 5 mg tablet, Take 3 tablets (15 mg) by mouth once daily., Disp: 90 tablet, Rfl: 0    sacubitriL-valsartan (Entresto) 24-26 mg tablet, Take 1 tablet by mouth once daily., Disp: 30 tablet, Rfl: 11    traZODone (Desyrel) 50 mg tablet, Take 1 tablet (50 mg) by mouth as needed at bedtime., Disp: , Rfl:     Review of Systems:   Constitutional: Denies fever, chills   Eyes: Denies dry eyes, pain in the eyes   ENT: Denies dry mouth, loss of taste, sores in the mouth  Cardiovascular: Denies chest pain, palpitations   Respiratory: Denies shortness of breath   Gastrointestinal: Denies heartburn   Integumentary: Denies photosensitivity, rash or lesions, Raynaud's   Neurological: Denies any numbness or tingling    MSK: As per HPI.     All 10 review of systems have been reviewed and are negative for complaint except as noted in the HPI    Objective   Physical Examination:  Vitals:     "04/04/24 1455   BP: 105/66   Pulse: 59   Temp: 35.9 °C (96.6 °F)     Growth %ile SmartLinks can only be used for patients less than 20 years old.  Ht Readings from Last 1 Encounters:   04/04/24 1.575 m (5' 2\")     Wt Readings from Last 1 Encounters:   04/04/24 71.7 kg (158 lb)       General - NAD, sitting up in chair, well-groomed, pleasant, AAOx3  Head: Normocephalic, atraumatic  Eyes - PERRLA, EOMI. No conjunctiva injection.   Cardiovascular - Normal S1, S2. Regular rate and rhythm. No murmurs or rubs.  Lungs - Symmetric chest expansion. Clear to auscultation bilaterally.   Skin - No rashes or ulcers. Skin warm and dry. No erythema on bilateral cheeks.  Extremities - No edema, cyanosis ,or clubbing  Neurological - Alert and oriented x 3,  grossly intact. No focal deficit.  Musculoskeletal -  Shoulders: Full ROM, without pain, no swelling, warmth or tenderness.  Elbows: Full ROM, without pain, no swelling, warmth or tenderness.  Wrists: Full ROM, without pain, no swelling, warmth or tenderness.  MCP: No swelling, warmth or tenderness. Metacarpal squeeze negative  PIP: No swelling, warmth or tenderness.  DIP: No swelling, warmth or tenderness.  Hands : 5/5.    Sacroiliac joints: No local tenderness. ASIA negative.   Hips: Full ROM.  No malalignment.  Knees:  Full ROM, without pain, no swelling, warmth or point tenderness.   Ankles: Full ROM, without pain, swelling, warmth or tenderness.  Toes:  Metatarsal squeeze negative  Cervical spine: No tenderness or limitation of movement  Lumbar spine: No tenderness or limitation of movement        Laboratory Testing:  3/2024: CMP normal, CRP 1.08, ESR 10, CBC normal, RF-, CCP-  2/2024: CRP 1.25, ESR 26      Assessment/Plan   Kristin Charles \"Edel\" is a 78 y.o. female with PMHx of PVD, aortic stenosis, HFrEF, HTN, allergic rhinitis, DM, Hypothyroidism, GERD CKD, Anxiety, OA, and lumbar radiculopathy who is presenting today as a NP for PMR    ##Polymyalgia rheumatica " (PMR):   -Diagnosed by PCP, in ~2/2024. Shoulder/hip pain/stiffness and elevated CRP  and had prompt response to  steroids.   -Current dose of prednisone: 15 mg daily  -Symptoms: Improved  -Recent labs:3/2024: CMP normal, CRP 1.08, ESR 10, CBC normal, RF-, CCP-. 2/2024: CRP 1.25, ESR 26  -Steroid plan:continue 15 mg for total 4 weeks (until 4/15) then reduce to 12.5 mg for 2 weeks and will reevaluate  -GCA symptoms: no headache, jaw pain or claudications. No new vision changes. Patient is aware and advised to go to ED if he experiences any GCA symptoms.  -Take 2000 units vitamin D and calcium supplements    The assessment and plan, risk and benefits were discussed with the patient. All of the patients questions were answered and patient agrees to the plan.      Mellissa Webster MD  Clinical   Department of Rheumatology   [facility]

## 2024-04-04 NOTE — PATIENT INSTRUCTIONS
Continue 15 mg prednisone until 4/15 then go down to 12.5 mg daily (2 and a half pills) until we see you again  RTC 3 weeks  Blood work today  Take 2000 units vitamin D and calcium supplements      Patient is aware and advised to go to ED if he experiences any GCA symptoms ( headache, jaw pain or claudications. New vision changes).

## 2024-04-05 ENCOUNTER — OFFICE VISIT (OUTPATIENT)
Dept: PRIMARY CARE | Facility: CLINIC | Age: 79
End: 2024-04-05
Payer: MEDICARE

## 2024-04-05 ENCOUNTER — TELEPHONE (OUTPATIENT)
Dept: PRIMARY CARE | Facility: CLINIC | Age: 79
End: 2024-04-05

## 2024-04-05 VITALS
BODY MASS INDEX: 28.9 KG/M2 | DIASTOLIC BLOOD PRESSURE: 68 MMHG | WEIGHT: 158 LBS | TEMPERATURE: 97.6 F | SYSTOLIC BLOOD PRESSURE: 120 MMHG

## 2024-04-05 DIAGNOSIS — I50.22 HEART FAILURE WITH MID-RANGE EJECTION FRACTION (MULTI): ICD-10-CM

## 2024-04-05 DIAGNOSIS — E11.9 DIABETES MELLITUS TYPE II, NON INSULIN DEPENDENT (MULTI): Primary | ICD-10-CM

## 2024-04-05 DIAGNOSIS — M35.3 PMR (POLYMYALGIA RHEUMATICA) (MULTI): ICD-10-CM

## 2024-04-05 LAB
CRP SERPL-MCNC: 0.7 MG/DL
POC FINGERSTICK BLOOD GLUCOSE: 234 MG/DL (ref 70–100)

## 2024-04-05 PROCEDURE — 99213 OFFICE O/P EST LOW 20 MIN: CPT | Performed by: FAMILY MEDICINE

## 2024-04-05 PROCEDURE — 3074F SYST BP LT 130 MM HG: CPT | Performed by: FAMILY MEDICINE

## 2024-04-05 PROCEDURE — 1123F ACP DISCUSS/DSCN MKR DOCD: CPT | Performed by: FAMILY MEDICINE

## 2024-04-05 PROCEDURE — 82962 GLUCOSE BLOOD TEST: CPT | Performed by: FAMILY MEDICINE

## 2024-04-05 PROCEDURE — 1160F RVW MEDS BY RX/DR IN RCRD: CPT | Performed by: FAMILY MEDICINE

## 2024-04-05 PROCEDURE — 3078F DIAST BP <80 MM HG: CPT | Performed by: FAMILY MEDICINE

## 2024-04-05 PROCEDURE — 1159F MED LIST DOCD IN RCRD: CPT | Performed by: FAMILY MEDICINE

## 2024-04-05 NOTE — PROGRESS NOTES
Chief complaint:   Chief Complaint   Patient presents with    Follow-up     Shoulder and leg pain       HPI:  Kristin Charles is a 78 y.o. female who presents for evaluation of PMR and diabetes which has worsened since starting Prednisone. Jardiance 10 mg PO daily was started a few weeks ago. She established with rheumatology yesterday. She is taking her Prednisone as prescribed. She has established with a new cardiologist and is comfortable with her current plan.     Physical exam:  /68 (BP Location: Right arm, Patient Position: Sitting)   Temp 36.4 °C (97.6 °F) (Oral)   Wt 71.7 kg (158 lb)   BMI 28.90 kg/m²   General: NAD, well appearing female  Heart: RRR, no mumur appreciated  Lungs: CTAB, no wheezes, rales, rhonchi  Abdomen: soft, non tender, normoactive BS, no organomegaly  Extremities: No LE edema    Assessment/Plan   Problem List Items Addressed This Visit       Heart failure with mid-range ejection fraction (CMS/HCC)    Diabetes mellitus type II, non insulin dependent (CMS/HCC) - Primary    Relevant Orders    POCT Fingerstick Glucose manually resulted (Completed)     Other Visit Diagnoses       PMR (polymyalgia rheumatica) (CMS/HCC)            Increased Jardiance from 10 mg to 25 mg PO daily due to elevated blood sugar. Follow up 1 mo and follow diabetic diet.   She has established with rheumatology and has plan for her steroid taper. She has follow up in a couple weeks.      Melanie Muñoz DO

## 2024-04-05 NOTE — TELEPHONE ENCOUNTER
Edel Charles called and said she wants her prescription to go to [facility] instead of [facility].  She said it is to expensive on mail order.    Can you please order at [facility]?    empagliflozin (Jardiance) 25 mg    Take 1 tablet (25 mg) by mouth once daily.      [facility]   [address]      LF 4/5/24  LV 4/5/24  NV 11/12/24

## 2024-04-10 NOTE — PROGRESS NOTES
"Pharmacist Clinic: Heart Failure/Cardiomyopathy Management  Kristin Charles was referred to the Clinical Pharmacy Team for her heart failure/cardiomyopathy management.    Referring Provider:  Dr. Fang     PHARMACY ASSESSMENT    Affordability/Accessibility: Patient was prescribed Entresto at last visit with Dr. Fang but has not been able to afford it  Adherence/Organization:   - Entresto 24-26 mg every day - only taking once per day    - Jardiance 25 mg every day   - metoprolol succinate 25 mg every day   Adverse Effects: No; patient denies lightheadedness or dizziness     HISTORICAL PHARMACOTHERAPY:   - Patient has significant past medical history including CKD, aortic valve stenosis, CHF, HTN, DM2. Patient was found to have reduced EF in 11/2023. Working with cardio and PCP to optimize medication therapy. Very active patient (swims at [facility] [employer]).     RELEVANT LAB RESULTS  Lab Results   Component Value Date    BILITOT 0.7 03/16/2024    CALCIUM 9.2 03/16/2024    CO2 29 03/16/2024     03/16/2024    CREATININE 0.89 03/16/2024    GLUCOSE 305 (H) 03/16/2024    ALKPHOS 57 03/16/2024    K 4.4 03/16/2024    PROT 6.1 (L) 03/16/2024     03/16/2024    AST 12 03/16/2024    ALT 15 03/16/2024    BUN 19 03/16/2024    ANIONGAP 13 03/16/2024    MG 2.33 11/01/2019    PHOS 3.3 10/07/2022    ALBUMIN 3.6 03/16/2024    GFRF CANCELED 05/27/2023    GFRMALE CANCELED 05/27/2023     Lab Results   Component Value Date    TRIG 136 11/15/2023    CHOL 171 11/15/2023    LDLCALC 90 11/15/2023    HDL 53.6 11/15/2023     No results found for: \"BMCBC\", \"CBCDIF\"       DRUG INTERACTIONS  - No    CHF ASSESSMENT     Symptom/Staging:  -Most recent ejection fraction: Unknown   -NYHA Stage: Unknown      Guideline-Directed Medical Therapy:  -ARNI: Yes, describe: Entresto 24-26 mg every day    -If no, then ACEi/ARB?: No  -Beta Blocker: Yes, describe: metoprolol succinate 25 mg every day   -MRA: No  -SGLT2i: Yes, describe: Jardiance 25 " mg every day     Secondary Prevention:  -The ASCVD Risk score (Jean-Paul DK, et al., 2019) failed to calculate for the following reasons:    The 2019 ASCVD risk score is only valid for ages 40 to 79   -Aspirin 81mg? yes  -Statin?: No  -HTN?: Yes, describe: 120/68 (controlled)    PATIENT EDUCATION/GOALS  - Counseled patient on MOA, expectations, duration of therapy, contraindications, administration, and monitoring parameters  - Counseled patient on lifestyle modifications that can decrease your risk of having complications (smoking cessation, losing weight, daily weights, vaccines)  - Counseled patient on fluid intake and weight management. Recommended to not consume more than 2 liters of fliuids per day. If they have gained more than 2-3 pounds within a 24 hours period (or 5 pounds in a week), contact their cardiologist  - Answered all patient questions and concerns    RECOMMENDATIONS/PLAN     Patient Assistance Program (PAP)    Patient verbally reports monthly or yearly income which is less than 400% federal poverty level    Application for program to be submitted for the following medications: Entresto and Jardiance    Prescription Insurance: Yes  Members of Household: 1  Files Taxes: Yes    Patient will be faxing financial information to pharmacist directly at [phone].    Patient aware this process may take up to 6 weeks.     If approved medication must be filled through [facility] the [facility] pharmacy and mailed to patient.      1. CONTINUE Entresto 24-26 mg every day   2. CONTINUE Jardiance 25 mg every day   3. CONTINUE metoprolol succinate 25 mg every day   4. Waiting for income documents for  PAP     Next Cardiology Appointment: 4/22/2024  Clinical Pharmacist follow up: 7/12/2024  Type of Encounter: Zaida Dunne PharmD    Verbal consent to manage patient's drug therapy was obtained from the patient . They were informed they may decline to participate or withdraw from participation in pharmacy  services at any time.    Continue all meds under the continuation of care with the referring provider and clinical pharmacy team.

## 2024-04-11 ENCOUNTER — LAB (OUTPATIENT)
Dept: LAB | Facility: LAB | Age: 79
End: 2024-04-11
Payer: MEDICARE

## 2024-04-11 DIAGNOSIS — I50.22 HEART FAILURE WITH MID-RANGE EJECTION FRACTION (MULTI): ICD-10-CM

## 2024-04-11 LAB
ANION GAP SERPL CALC-SCNC: 15 MMOL/L (ref 10–20)
BUN SERPL-MCNC: 22 MG/DL (ref 6–23)
CALCIUM SERPL-MCNC: 9.2 MG/DL (ref 8.6–10.6)
CHLORIDE SERPL-SCNC: 105 MMOL/L (ref 98–107)
CO2 SERPL-SCNC: 27 MMOL/L (ref 21–32)
CREAT SERPL-MCNC: 0.94 MG/DL (ref 0.5–1.05)
EGFRCR SERPLBLD CKD-EPI 2021: 61 ML/MIN/1.73M*2
GLUCOSE SERPL-MCNC: 188 MG/DL (ref 74–99)
POTASSIUM SERPL-SCNC: 4 MMOL/L (ref 3.5–5.3)
SODIUM SERPL-SCNC: 143 MMOL/L (ref 136–145)

## 2024-04-11 PROCEDURE — 36415 COLL VENOUS BLD VENIPUNCTURE: CPT

## 2024-04-11 PROCEDURE — 80048 BASIC METABOLIC PNL TOTAL CA: CPT

## 2024-04-12 ENCOUNTER — TELEMEDICINE (OUTPATIENT)
Dept: PHARMACY | Facility: HOSPITAL | Age: 79
End: 2024-04-12
Payer: MEDICARE

## 2024-04-12 DIAGNOSIS — I50.22 HEART FAILURE WITH MID-RANGE EJECTION FRACTION (MULTI): ICD-10-CM

## 2024-04-12 NOTE — Clinical Note
Hi Dr. Fang. We are going to try to get Kristin set up with [facility] for Entresto and Jardiance. Will keep you up to date.

## 2024-04-15 ENCOUNTER — SPECIALTY PHARMACY (OUTPATIENT)
Dept: PHARMACY | Facility: CLINIC | Age: 79
End: 2024-04-15

## 2024-04-15 ENCOUNTER — TELEPHONE (OUTPATIENT)
Dept: PHARMACY | Facility: HOSPITAL | Age: 79
End: 2024-04-15
Payer: MEDICARE

## 2024-04-15 PROCEDURE — RXMED WILLOW AMBULATORY MEDICATION CHARGE

## 2024-04-15 NOTE — TELEPHONE ENCOUNTER
Patient Assistance Program Approval:     We are pleased to inform you that your application for assistance has been approved.     This approval is valid through  4/15/2025  as long as the following criteria continue to be satisfied:     Your medication (Entresto and Jardiance) remains covered under your current insurance plan.   Your prescriber does not discontinue therapy.   You do not seek reimbursement from any other private or government-funded programs for the  medication.    Under this program, the pharmacy will first bill your insurance plan for your indemnified specified medication. The [employer] Assistance Fund will then offset your copay balance, so that your out-of pocket expense for your specialty medication will be $0.00.    LVM for patient with information     Shital Dunne, CatieD

## 2024-04-16 ENCOUNTER — APPOINTMENT (OUTPATIENT)
Dept: RHEUMATOLOGY | Facility: CLINIC | Age: 79
End: 2024-04-16
Payer: MEDICARE

## 2024-04-16 ENCOUNTER — HOSPITAL ENCOUNTER (OUTPATIENT)
Dept: CARDIOLOGY | Facility: HOSPITAL | Age: 79
Discharge: HOME | End: 2024-04-16
Payer: MEDICARE

## 2024-04-16 DIAGNOSIS — R00.2 PALPITATIONS: ICD-10-CM

## 2024-04-16 PROCEDURE — 93248 EXT ECG>7D<15D REV&INTERPJ: CPT | Performed by: INTERNAL MEDICINE

## 2024-04-16 PROCEDURE — 93246 EXT ECG>7D<15D RECORDING: CPT

## 2024-04-18 ENCOUNTER — PHARMACY VISIT (OUTPATIENT)
Dept: PHARMACY | Facility: CLINIC | Age: 79
End: 2024-04-18
Payer: COMMERCIAL

## 2024-04-22 ENCOUNTER — APPOINTMENT (OUTPATIENT)
Dept: CARDIOLOGY | Facility: CLINIC | Age: 79
End: 2024-04-22
Payer: MEDICARE

## 2024-04-22 ENCOUNTER — OFFICE VISIT (OUTPATIENT)
Dept: CARDIOLOGY | Facility: CLINIC | Age: 79
End: 2024-04-22
Payer: MEDICARE

## 2024-04-22 VITALS
DIASTOLIC BLOOD PRESSURE: 50 MMHG | SYSTOLIC BLOOD PRESSURE: 92 MMHG | BODY MASS INDEX: 28.52 KG/M2 | HEIGHT: 62 IN | HEART RATE: 72 BPM | WEIGHT: 155 LBS

## 2024-04-22 DIAGNOSIS — I35.1 NONRHEUMATIC AORTIC VALVE INSUFFICIENCY: ICD-10-CM

## 2024-04-22 DIAGNOSIS — I50.22 HEART FAILURE WITH MID-RANGE EJECTION FRACTION (MULTI): ICD-10-CM

## 2024-04-22 DIAGNOSIS — I35.0 NONRHEUMATIC AORTIC VALVE STENOSIS: Primary | ICD-10-CM

## 2024-04-22 PROCEDURE — 99215 OFFICE O/P EST HI 40 MIN: CPT | Performed by: INTERNAL MEDICINE

## 2024-04-22 PROCEDURE — 1159F MED LIST DOCD IN RCRD: CPT | Performed by: INTERNAL MEDICINE

## 2024-04-22 PROCEDURE — 3078F DIAST BP <80 MM HG: CPT | Performed by: INTERNAL MEDICINE

## 2024-04-22 PROCEDURE — 1036F TOBACCO NON-USER: CPT | Performed by: INTERNAL MEDICINE

## 2024-04-22 PROCEDURE — 3074F SYST BP LT 130 MM HG: CPT | Performed by: INTERNAL MEDICINE

## 2024-04-22 PROCEDURE — 1160F RVW MEDS BY RX/DR IN RCRD: CPT | Performed by: INTERNAL MEDICINE

## 2024-04-22 NOTE — PATIENT INSTRUCTIONS

## 2024-04-22 NOTE — PROGRESS NOTES
"Chief Complaint:   Please see below.     History Of Present Illness:    Kristin Charles \"Crescencio" is a 78 y.o. female presenting with aortic stenosis, aortic regurgitation.    This 78-year-old hypertensive, diabetic, hyperlipidemic woman without significant CAD on cath in November 2023 has,  moderate (3+) aortic regurgitation and moderately severe aortic stenosis (aortic valve area by MICKEY planimetry = 1.0 cm² March 19, 2024; indexed aortic valve area = 0.56 cm²/m²).  She an ejection fraction of 40% by transthoracic echo at the [facility] in November 2023, confirmed to have mild left ventricular dysfunction by transesophageal echo on March 19, 2024, also at the [facility].  Aortic valve calcium score was 247 in January 2024.  She has heart failure with midrange ejection fraction.       She gets tired quickly while doing outdoor chores such as picking up sticks in her yard.  She did so recently, and had to stop and pause to catch her breath six times, whereas last year she could complete the task without dyspnea.  She has consciously restricted herself in doing certain chores.  Vacuuming her home now, she pauses more frequently to take breaks and rest; whereas last year she could complete the task without stopping.  She has noticed some imrovement in her ability to do these tasks since starting Jardiance.  The patient denies chest discomfort, dyspnea, palpitations, orthopnea, PND, syncope, and near syncope.    I personally reviewed the images of her echocardiograms done at the [facility].  I concur with testing results as reported.     Last Recorded Vitals:  Vitals:    04/22/24 1500   BP: 92/50   BP Location: Right arm   Patient Position: Sitting   Pulse: 72   Weight: 70.3 kg (155 lb)   Height: 1.575 m (5' 2\")       Past Medical History:  She has a past medical history of Actinic keratosis, Acute candidiasis of vulva and vagina, Acute laryngitis (03/20/2018), Baker's cyst of knee (04/06/2023), Bitten " or stung by nonvenomous insect and other nonvenomous arthropods, initial encounter (09/02/2015), Candidiasis, unspecified (10/13/2017), Chronic ethmoidal sinusitis (11/24/2020), Chronic maxillary sinusitis (08/06/2020), Disease of thyroid gland, Eczema, Heart murmur, Heart valve disease, Laceration without foreign body of unspecified forearm, initial encounter (01/09/2022), Lumbago with sciatica, left side (03/29/2019), 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), Nail disorder, unspecified (08/08/2023), Neoplasm of uncertain behavior of skin (08/08/2023), Obesity (01/19/2015), Onychomycosis (04/06/2023), Open wound of hip and thigh with tendon involvement (05/24/2018), Overweight (BMI 25.0-29.9) (04/06/2023), Personal history of diseases of the skin and subcutaneous tissue (01/09/2022), Personal history of diseases of the skin and subcutaneous tissue (09/02/2015), Personal history of other diseases of the musculoskeletal system and connective tissue, Personal history of other diseases of the nervous system and sense organs, Personal history of other diseases of the nervous system and sense organs (09/20/2019), Personal history of other endocrine, nutritional and metabolic disease, Personal history of other infectious and parasitic diseases (09/23/2017), Personal history of other specified conditions (03/06/2020), Rash and other nonspecific skin eruption (06/15/2015), Rhinitis (10 years), Sciatica (12/11/2014), 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    Past Surgical History:  She has a past surgical history that includes Cataract extraction (06/30/2014); Other surgical history (01/09/2022); Tonsillectomy; Dilation and curettage of uterus; Total knee arthroplasty;  Joint replacement; and Skin biopsy.      Social History:  She reports that she quit smoking about 59 years ago. Her smoking use included cigarettes. She started smoking about 64 years ago. She has a 1.3 pack-year smoking history. She has never been exposed to tobacco smoke. She has never used smokeless tobacco. She reports that she does not drink alcohol and does not use drugs.    Family History:  Family History   Problem Relation Name Age of Onset    Cancer Sister Kaila         Allergies:  Morphine, Other, Sulfa (sulfonamide antibiotics), Miconazole, and Neomycin-bacitracin-polymyxin    Outpatient Medications:  Current Outpatient Medications   Medication Instructions    aspirin 81 mg, oral, Daily RT    biotin 10 mg tablet 1 tablet, oral, Daily    calcium carbonate-vitamin D3 600 mg-5 mcg (200 unit) tablet 1 tablet, oral, 2 times daily    celecoxib (CeleBREX) 200 mg capsule 1 capsule, oral, Daily, With a meal    empagliflozin (JARDIANCE) 25 mg, oral, Daily    levothyroxine (SYNTHROID, LEVOXYL) 125 mcg, oral, Daily    metoprolol succinate XL (TOPROL-XL) 25 mg, oral, Daily, Do not crush or chew.    mometasone (Elocon) 0.1 % lotion Use 3 drops topically to affected ear PRN itching    multivitamin-min-iron-FA-vit K 45 mg iron- 800 mcg-120 mcg capsule 1 capsule, oral, Daily    omega 3-dha-epa-fish oil (Fish OiL) 1,000 mg (120 mg-180 mg) capsule 120 mg, oral, Daily RT    pantoprazole (PROTONIX) 40 mg, oral, Daily, Do not crush, chew, or split.    predniSONE (DELTASONE) 15 mg, oral, Daily    sacubitriL-valsartan (Entresto) 24-26 mg tablet 1 tablet, oral, Daily    traZODone (Desyrel) 50 mg tablet 1 tablet, oral, Nightly PRN       Physical Exam:  GENERAL:  pleasant 78 year-old  HEENT: No xanthelasma  NECK: Supple, no palpable adenopathy or thyromegaly  CHEST: Clear to auscultation, respiratory effort unlabored  CARDIAC: RRR, normal S1 and S2, no audible  rub, gallop, carotids are brisk, PMI is not displaced; there is a  3/6 crescendo decrescendo murmur heard best at the RSB audible over the entire precordium, and radiating to the carotids; there is a grade 1 diastolic murmur heard best at the LSB   ABD: Active bowel sounds, nontender, no organomegaly, no evidence of ascites  EXT: No clubbing, cyanosis, edema, or tenderness  NEURO: Awake, alert, appropriate, speech is fluent       Last Labs:  CBC -  Lab Results   Component Value Date    WBC 6.2 03/16/2024    HGB 14.0 03/16/2024    HCT 43.9 03/16/2024    MCV 93 03/16/2024     03/16/2024       CMP -  Lab Results   Component Value Date    CALCIUM 9.2 04/11/2024    PHOS 3.3 10/07/2022    PROT 6.1 (L) 03/16/2024    ALBUMIN 3.6 03/16/2024    AST 12 03/16/2024    ALT 15 03/16/2024    ALKPHOS 57 03/16/2024    BILITOT 0.7 03/16/2024       LIPID PANEL -   Lab Results   Component Value Date    CHOL 171 11/15/2023    TRIG 136 11/15/2023    HDL 53.6 11/15/2023    CHHDL 3.2 11/15/2023    LDLF 77 11/05/2021    VLDL 27 11/15/2023    NHDL 117 11/15/2023       RENAL FUNCTION PANEL -   Lab Results   Component Value Date    GLUCOSE 188 (H) 04/11/2024     04/11/2024    K 4.0 04/11/2024     04/11/2024    CO2 27 04/11/2024    ANIONGAP 15 04/11/2024    BUN 22 04/11/2024    CREATININE 0.94 04/11/2024    GFRMALE CANCELED 05/27/2023    CALCIUM 9.2 04/11/2024    PHOS 3.3 10/07/2022    ALBUMIN 3.6 03/16/2024        Lab Results   Component Value Date    HGBA1C 7.2 (A) 12/27/2023         Lab review: I have Chemistry CMP:   Lab Results   Component Value Date    ALBUMIN 3.6 03/16/2024    CALCIUM 9.2 04/11/2024    CO2 27 04/11/2024    CREATININE 0.94 04/11/2024    GLUCOSE 188 (H) 04/11/2024    BILITOT 0.7 03/16/2024    PROT 6.1 (L) 03/16/2024    ALT 15 03/16/2024    AST 12 03/16/2024    ALKPHOS 57 03/16/2024   , Chemistry BMP   Lab Results   Component Value Date    GLUCOSE 188 (H) 04/11/2024    CALCIUM 9.2 04/11/2024    CO2 27 04/11/2024    CREATININE 0.94 04/11/2024   , and CBC:  Lab Results    Component Value Date    WBC 6.2 03/16/2024    RBC 4.72 03/16/2024    HGB 14.0 03/16/2024    HCT 43.9 03/16/2024    MCV 93 03/16/2024    MCH 29.7 03/16/2024    MCHC 31.9 (L) 03/16/2024    RDW 13.4 03/16/2024    NRBC 0.0 03/16/2024     Diagnostic review: I have independently interpreted the transesophageal echocardiogram.  My findings are as reflected in the report.    Assessment/Plan   Problem List Items Addressed This Visit          Cardiac and Vasculature    Aortic valve stenosis - Primary    Relevant Orders    Referral to Valve and Structural Heart Program    Follow Up In Cardiology    Heart failure with mid-range ejection fraction (Multi)    Nonrheumatic aortic valve insufficiency    Relevant Orders    Referral to Valve and Structural Heart Program    Follow Up In Cardiology   This 78-year-old woman has combined aortic regurgitation and aortic stenosis with an aortic valve area of 0.56 cm²/m².  She has heart failure with midrange ejection fraction of 40%.  She has moderate mitral regurgitation as well.  She has no critical coronary stenosis on cardiac catheterization done in November 2023 at the [facility].  Her functional capacity is minimally improved with the addition of Jardiance.  She has seen Dr. Lazaro of the structural heart team by way of virtual visit.  Her symptoms are likely progressing as a consequence of combined aortic valvular stenosis and regurgitation.  I have referred her again to Dr. Lazaro to discuss next steps.      Cruz Fang MD

## 2024-04-24 ASSESSMENT — DERMATOLOGY QUALITY OF LIFE (QOL) ASSESSMENT
RATE HOW BOTHERED YOU ARE BY SYMPTOMS OF YOUR SKIN PROBLEM (EG, ITCHING, STINGING BURNING, HURTING OR SKIN IRRITATION): 5
RATE HOW BOTHERED YOU ARE BY EFFECTS OF YOUR SKIN PROBLEMS ON YOUR ACTIVITIES (EG, GOING OUT, ACCOMPLISHING WHAT YOU WANT, WORK ACTIVITIES OR YOUR RELATIONSHIPS WITH OTHERS): 0 - NEVER BOTHERED
DATE THE QUALITY-OF-LIFE ASSESSMENT WAS COMPLETED: 66952
DATE THE QUALITY-OF-LIFE ASSESSMENT WAS COMPLETED: 04/22/2024
RATE HOW BOTHERED YOU ARE BY SYMPTOMS OF YOUR SKIN PROBLEM (EG, ITCHING, STINGING BURNING, HURTING OR SKIN IRRITATION): 5
WHAT SINGLE SKIN CONDITION LISTED BELOW IS THE PATIENT ANSWERING THE QUALITY-OF-LIFE ASSESSMENT QUESTIONS ABOUT: DERMATITIS
RATE HOW EMOTIONALLY BOTHERED YOU ARE BY YOUR SKIN PROBLEM (FOR EXAMPLE, WORRY, EMBARRASSMENT, FRUSTRATION): 0 - NEVER BOTHERED
RATE HOW EMOTIONALLY BOTHERED YOU ARE BY YOUR SKIN PROBLEM (FOR EXAMPLE, WORRY, EMBARRASSMENT, FRUSTRATION): 0 - NEVER BOTHERED
RATE HOW BOTHERED YOU ARE BY EFFECTS OF YOUR SKIN PROBLEMS ON YOUR ACTIVITIES (EG, GOING OUT, ACCOMPLISHING WHAT YOU WANT, WORK ACTIVITIES OR YOUR RELATIONSHIPS WITH OTHERS): 0 - NEVER BOTHERED
WHAT SINGLE SKIN CONDITION LISTED BELOW IS THE PATIENT ANSWERING THE QUALITY-OF-LIFE ASSESSMENT QUESTIONS ABOUT: DERMATITIS

## 2024-04-24 NOTE — PROGRESS NOTES
"Rheumatology New Outpatient  Note    Subjective   Kristin Charles \"Crescencio" is a 78 y.o. female presenting today for Joint Pain.    History of Presenting Problem:     Rheum history: started having shoulder/hip pain and stiffness around 1/2024 and her CRP was elevated. She was initially given a medrol adam. She continued to have symptoms and in 3/15/2024 and she was started on 15 mg daily. After 10 days she self reduced to 10 mg for 2 weeks then she was instructed to go back to 15 mg. She is overall feeling better but continues to have more pain and stiffness in the morning. No headache, jaw pain or scalp tenderness.    She is feeling well on 15 mg since last visit. No shoulder pain ir stiffness.     Past Medical History:   Past Medical History:   Diagnosis Date    Actinic keratosis     Acute candidiasis of vulva and vagina     Yeast infection of the vagina    Acute laryngitis 03/20/2018    Acute laryngitis    Lee's cyst of knee 04/06/2023    Bitten or stung by nonvenomous insect and other nonvenomous arthropods, initial encounter 09/02/2015    Bug bite with infection    Candidiasis, unspecified 10/13/2017    Yeast infection    Chronic ethmoidal sinusitis 11/24/2020    Chronic ethmoidal sinusitis    Chronic maxillary sinusitis 08/06/2020    Chronic maxillary sinusitis    Disease of thyroid gland     Eczema     Heart murmur     Heart valve disease     Laceration without foreign body of unspecified forearm, initial encounter 01/09/2022    Forearm laceration    Lumbago with sciatica, left side 03/29/2019    Chronic bilateral low back pain with left-sided sciatica    Lymphocytic colitis 05/05/2023    Colonoscopy: 4/24/2023    Nail disorder, unspecified 08/08/2023    Neoplasm of uncertain behavior of skin 08/08/2023    Obesity 01/19/2015    Onychomycosis 04/06/2023    Open wound of hip and thigh with tendon involvement 05/24/2018    Overweight (BMI 25.0-29.9) 04/06/2023    Personal history of diseases of the skin and " subcutaneous tissue 01/09/2022    History of contact dermatitis    Personal history of diseases of the skin and subcutaneous tissue 09/02/2015    History of cellulitis    Personal history of other diseases of the musculoskeletal system and connective tissue     Personal history of arthritis    Personal history of other diseases of the nervous system and sense organs     History of cataract    Personal history of other diseases of the nervous system and sense organs 09/20/2019    History of sciatica    Personal history of other endocrine, nutritional and metabolic disease     History of hypothyroidism    Personal history of other infectious and parasitic diseases 09/23/2017    History of erysipelas    Personal history of other specified conditions 03/06/2020    History of epistaxis    Rash and other nonspecific skin eruption 06/15/2015    Skin rash    Rhinitis 10 years    Sciatica 12/11/2014    Status post joint replacement 06/19/2023    Strain of muscle, fascia and tendon of the posterior muscle group at thigh level, unspecified thigh, initial encounter 05/01/2018    Hamstring strain    Tinnitus 10 years    Vaginal discharge 04/06/2023    Varus deformity, not elsewhere classified, unspecified knee 03/06/2019    Acquired genu varum       Allergies:   Allergies   Allergen Reactions    Morphine Unknown    Other Other    Sulfa (Sulfonamide Antibiotics) Unknown    Miconazole Rash    Neomycin-Bacitracin-Polymyxin Rash       Medications:   Current Outpatient Medications:     aspirin 81 mg EC tablet, Take 1 tablet (81 mg) by mouth once daily., Disp: , Rfl:     biotin 10 mg tablet, Take 1 tablet (10 mg) by mouth once daily., Disp: , Rfl:     calcium carbonate-vitamin D3 600 mg-5 mcg (200 unit) tablet, Take 1 tablet by mouth 2 times a day., Disp: , Rfl:     celecoxib (CeleBREX) 200 mg capsule, Take 1 capsule (200 mg) by mouth once daily. With a meal, Disp: , Rfl:     empagliflozin (Jardiance) 25 mg, Take 1 tablet (25 mg) by  "mouth once daily., Disp: 90 tablet, Rfl: 3    levothyroxine (Synthroid, Levoxyl) 125 mcg tablet, Take 1 tablet (125 mcg) by mouth once daily., Disp: 30 tablet, Rfl: 11    metoprolol succinate XL (Toprol-XL) 25 mg 24 hr tablet, Take 1 tablet (25 mg) by mouth once daily. Do not crush or chew., Disp: 90 tablet, Rfl: 3    mometasone (Elocon) 0.1 % lotion, Use 3 drops topically to affected ear PRN itching, Disp: 60 mL, Rfl: 1    multivitamin-min-iron-FA-vit K 45 mg iron- 800 mcg-120 mcg capsule, Take 1 capsule by mouth once daily., Disp: , Rfl:     omega 3-dha-epa-fish oil (Fish OiL) 1,000 mg (120 mg-180 mg) capsule, Take 120 mg by mouth once daily., Disp: , Rfl:     pantoprazole (ProtoNix) 40 mg EC tablet, Take 1 tablet (40 mg) by mouth once daily. Do not crush, chew, or split., Disp: 30 tablet, Rfl: 1    predniSONE (Deltasone) 5 mg tablet, Take 3 tablets (15 mg) by mouth once daily., Disp: 90 tablet, Rfl: 0    sacubitriL-valsartan (Entresto) 24-26 mg tablet, Take 1 tablet by mouth once daily., Disp: 90 tablet, Rfl: 3    traZODone (Desyrel) 50 mg tablet, Take 1 tablet (50 mg) by mouth as needed at bedtime., Disp: , Rfl:     Review of Systems:   Constitutional: Denies fever, chills   Eyes: Denies dry eyes, pain in the eyes   ENT: Denies dry mouth, loss of taste, sores in the mouth  Cardiovascular: Denies chest pain, palpitations   Respiratory: Denies shortness of breath   Gastrointestinal: Denies heartburn   Integumentary: Denies photosensitivity, rash or lesions, Raynaud's   Neurological: Denies any numbness or tingling    MSK: As per HPI.     All 10 review of systems have been reviewed and are negative for complaint except as noted in the HPI    Objective   Physical Examination:  Vitals:    04/25/24 1458   BP: 129/77   Pulse: 68   Temp: 36.3 °C (97.3 °F)       Growth %ile SmartLinks can only be used for patients less than 20 years old.  Ht Readings from Last 1 Encounters:   04/25/24 1.575 m (5' 2\")     Wt Readings from " "Last 1 Encounters:   04/25/24 70.3 kg (155 lb)       General - NAD, sitting up in chair, well-groomed, pleasant, AAOx3  Head: Normocephalic, atraumatic  Eyes - PERRLA, EOMI. No conjunctiva injection.   Cardiovascular - No murmurs or rubs.  Lungs - Symmetric chest expansion.   Skin - No rashes or ulcers. Skin warm and dry. No erythema on bilateral cheeks.  Extremities - No edema, cyanosis ,or clubbing  Neurological - Alert and oriented x 3,  grossly intact. No focal deficit.          Laboratory Testing:  3/2024: CMP normal, CRP 1.08, ESR 10, CBC normal, RF-, CCP-  2/2024: CRP 1.25, ESR 26      Assessment/Plan   Kristin Charles \"Crescencio" is a 78 y.o. female with PMHx of PVD, aortic stenosis, HFrEF, HTN, allergic rhinitis, DM, Hypothyroidism, GERD CKD, Anxiety, OA, and lumbar radiculopathy who is presenting today as a Follow up for PMR    ##Polymyalgia rheumatica (PMR):   -Diagnosed by PCP, in ~2/2024. Shoulder/hip pain/stiffness and elevated CRP  and had prompt response to  steroids.   -Current dose of prednisone: 15 mg daily  -Symptoms: Improved  -Recent labs:4/2024: ESR/CRP normal  -Steroid plan: continue 15 mg for total 4 weeks (until 4/15) then reduce to 12.5 mg for 2 weeks  then 10 mg for 3-4 weeks  -Most recent ESR/CRP 4/4/24 normal  -GCA symptoms: no headache, jaw pain or claudications. No new vision changes. Patient is aware and advised to go to ED if he experiences any GCA symptoms.  -Take 2000 units vitamin D and calcium supplements    The assessment and plan, risk and benefits were discussed with the patient. All of the patients questions were answered and patient agrees to the plan.      Mellissa Webster MD  Clinical   Department of Rheumatology   [facility]      "

## 2024-04-25 ENCOUNTER — OFFICE VISIT (OUTPATIENT)
Dept: RHEUMATOLOGY | Facility: CLINIC | Age: 79
End: 2024-04-25
Payer: MEDICARE

## 2024-04-25 VITALS
BODY MASS INDEX: 28.52 KG/M2 | HEART RATE: 68 BPM | WEIGHT: 155 LBS | TEMPERATURE: 97.3 F | HEIGHT: 62 IN | SYSTOLIC BLOOD PRESSURE: 129 MMHG | DIASTOLIC BLOOD PRESSURE: 77 MMHG

## 2024-04-25 DIAGNOSIS — Z79.52 CURRENT CHRONIC USE OF SYSTEMIC STEROIDS: ICD-10-CM

## 2024-04-25 DIAGNOSIS — M35.3 PMR (POLYMYALGIA RHEUMATICA) (MULTI): Primary | ICD-10-CM

## 2024-04-25 PROCEDURE — 3074F SYST BP LT 130 MM HG: CPT | Performed by: STUDENT IN AN ORGANIZED HEALTH CARE EDUCATION/TRAINING PROGRAM

## 2024-04-25 PROCEDURE — 1160F RVW MEDS BY RX/DR IN RCRD: CPT | Performed by: STUDENT IN AN ORGANIZED HEALTH CARE EDUCATION/TRAINING PROGRAM

## 2024-04-25 PROCEDURE — 99214 OFFICE O/P EST MOD 30 MIN: CPT | Performed by: STUDENT IN AN ORGANIZED HEALTH CARE EDUCATION/TRAINING PROGRAM

## 2024-04-25 PROCEDURE — 1159F MED LIST DOCD IN RCRD: CPT | Performed by: STUDENT IN AN ORGANIZED HEALTH CARE EDUCATION/TRAINING PROGRAM

## 2024-04-25 PROCEDURE — 1036F TOBACCO NON-USER: CPT | Performed by: STUDENT IN AN ORGANIZED HEALTH CARE EDUCATION/TRAINING PROGRAM

## 2024-04-25 PROCEDURE — 3078F DIAST BP <80 MM HG: CPT | Performed by: STUDENT IN AN ORGANIZED HEALTH CARE EDUCATION/TRAINING PROGRAM

## 2024-04-25 RX ORDER — PREDNISONE 5 MG/1
12.5 TABLET ORAL DAILY
Qty: 75 TABLET | Refills: 2 | Status: SHIPPED | OUTPATIENT
Start: 2024-04-25 | End: 2024-05-21 | Stop reason: SDUPTHER

## 2024-04-25 NOTE — PATIENT INSTRUCTIONS
Take 2.5 pills (15 mg) prednisone daily for 2-3 weeks then go down to 2 pills (10 mg )daily for 3-4 weeks  Then start going down by 1 mg every 3-4 weeks    RTC 6 weeks  Blood work before next visit

## 2024-04-26 ENCOUNTER — OFFICE VISIT (OUTPATIENT)
Dept: DERMATOLOGY | Facility: CLINIC | Age: 79
End: 2024-04-26
Payer: MEDICARE

## 2024-04-26 DIAGNOSIS — B37.2 CANDIDIASIS OF SKIN: Primary | ICD-10-CM

## 2024-04-26 PROCEDURE — 1159F MED LIST DOCD IN RCRD: CPT | Performed by: DERMATOLOGY

## 2024-04-26 PROCEDURE — 1036F TOBACCO NON-USER: CPT | Performed by: DERMATOLOGY

## 2024-04-26 PROCEDURE — 1160F RVW MEDS BY RX/DR IN RCRD: CPT | Performed by: DERMATOLOGY

## 2024-04-26 PROCEDURE — 99213 OFFICE O/P EST LOW 20 MIN: CPT | Performed by: DERMATOLOGY

## 2024-04-26 RX ORDER — NYSTATIN 100000 U/G
CREAM TOPICAL
Qty: 30 G | Refills: 2 | Status: SHIPPED
Start: 2024-04-26 | End: 2024-05-21

## 2024-04-26 ASSESSMENT — ITCH NUMERIC RATING SCALE: HOW SEVERE IS YOUR ITCHING?: 0

## 2024-04-26 NOTE — PROGRESS NOTES
Subjective     Edel Charles is a 78 y.o. female who presents for the following: Suspicious Skin Lesion (Pt here for lesion on right abdomen for 3 days. Pt has hx of BCC. Pt reports redness. Pt has tried bacitracin.).     Review of Systems:  No other skin or systemic complaints other than what is documented elsewhere in the note.    The following portions of the chart were reviewed this encounter and updated as appropriate:          Skin Cancer History  No skin cancer on file.      Specialty Problems          Dermatology Problems    Hair loss        Objective   Well appearing patient in no apparent distress; mood and affect are within normal limits.    A focused skin examination was performed of the breasts. All findings within normal limits unless otherwise noted below.    Assessment/Plan   1. Candidiasis of skin  Right Inframammary Fold  Erythematous patch with satellite papules    The chronic and intermittently flaring nature of this skin condition was reviewed with the patient today.  Multifactorial in nature - specially skin folds with increased moisture, friction, rubbing and yeast/fungus component    Start Nystatin cream 2x daily x 1 month, if recurs repeat use. If not improving after 3-4 weeks please contact office.      nystatin (Mycostatin) cream - Right Inframammary Fold  Apply 2x daily x 1 month; repeat if needed

## 2024-05-03 DIAGNOSIS — I35.0 AORTIC VALVE STENOSIS, ETIOLOGY OF CARDIAC VALVE DISEASE UNSPECIFIED: Primary | ICD-10-CM

## 2024-05-07 ENCOUNTER — OFFICE VISIT (OUTPATIENT)
Dept: PRIMARY CARE | Facility: CLINIC | Age: 79
End: 2024-05-07
Payer: MEDICARE

## 2024-05-07 ENCOUNTER — TELEMEDICINE (OUTPATIENT)
Dept: PHARMACY | Facility: HOSPITAL | Age: 79
End: 2024-05-07
Payer: MEDICARE

## 2024-05-07 ENCOUNTER — PHARMACY VISIT (OUTPATIENT)
Dept: PHARMACY | Facility: CLINIC | Age: 79
End: 2024-05-07
Payer: COMMERCIAL

## 2024-05-07 VITALS
TEMPERATURE: 97.7 F | DIASTOLIC BLOOD PRESSURE: 58 MMHG | WEIGHT: 152 LBS | SYSTOLIC BLOOD PRESSURE: 108 MMHG | BODY MASS INDEX: 27.8 KG/M2

## 2024-05-07 DIAGNOSIS — E11.9 DIABETES MELLITUS TYPE II, NON INSULIN DEPENDENT (MULTI): ICD-10-CM

## 2024-05-07 DIAGNOSIS — M35.3 PMR (POLYMYALGIA RHEUMATICA) (MULTI): ICD-10-CM

## 2024-05-07 DIAGNOSIS — E11.9 DIABETES MELLITUS TYPE II, NON INSULIN DEPENDENT (MULTI): Primary | ICD-10-CM

## 2024-05-07 LAB
POC FINGERSTICK BLOOD GLUCOSE: 221 MG/DL (ref 70–100)
POC HEMOGLOBIN A1C: 11.6 % (ref 4.2–6.5)

## 2024-05-07 PROCEDURE — 3078F DIAST BP <80 MM HG: CPT | Performed by: FAMILY MEDICINE

## 2024-05-07 PROCEDURE — RXMED WILLOW AMBULATORY MEDICATION CHARGE

## 2024-05-07 PROCEDURE — 82962 GLUCOSE BLOOD TEST: CPT | Performed by: FAMILY MEDICINE

## 2024-05-07 PROCEDURE — 1159F MED LIST DOCD IN RCRD: CPT | Performed by: FAMILY MEDICINE

## 2024-05-07 PROCEDURE — 99213 OFFICE O/P EST LOW 20 MIN: CPT | Performed by: FAMILY MEDICINE

## 2024-05-07 PROCEDURE — 83036 HEMOGLOBIN GLYCOSYLATED A1C: CPT | Performed by: FAMILY MEDICINE

## 2024-05-07 PROCEDURE — 1160F RVW MEDS BY RX/DR IN RCRD: CPT | Performed by: FAMILY MEDICINE

## 2024-05-07 PROCEDURE — 3074F SYST BP LT 130 MM HG: CPT | Performed by: FAMILY MEDICINE

## 2024-05-07 PROCEDURE — 1123F ACP DISCUSS/DSCN MKR DOCD: CPT | Performed by: FAMILY MEDICINE

## 2024-05-07 RX ORDER — INSULIN GLARGINE 100 [IU]/ML
10 INJECTION, SOLUTION SUBCUTANEOUS NIGHTLY
Qty: 15 ML | Refills: 1 | Status: SHIPPED | OUTPATIENT
Start: 2024-05-07 | End: 2024-05-08 | Stop reason: SDUPTHER

## 2024-05-07 RX ORDER — LANCETS
EACH MISCELLANEOUS
Qty: 100 EACH | Refills: 1 | Status: SHIPPED | OUTPATIENT
Start: 2024-05-07

## 2024-05-07 RX ORDER — IBUPROFEN 200 MG
1 CAPSULE ORAL 2 TIMES DAILY
Qty: 100 STRIP | Refills: 1 | Status: SHIPPED | OUTPATIENT
Start: 2024-05-07

## 2024-05-07 RX ORDER — DEXTROSE 4 G
TABLET,CHEWABLE ORAL
Qty: 1 EACH | Refills: 0 | Status: SHIPPED | OUTPATIENT
Start: 2024-05-07

## 2024-05-07 ASSESSMENT — ENCOUNTER SYMPTOMS: DIABETIC ASSOCIATED SYMPTOMS: 0

## 2024-05-07 NOTE — PROGRESS NOTES
Subjective   Patient ID: Edel Charles is a 78 y.o. female who presents for Follow-up (diabetes).    HPI   She presents for follow up and assessment of steroid induced diabetes. Increased jardiance from 10 to 25mg one month ago; no side effects.     Review of Systems    Objective   /58 (BP Location: Right arm, Patient Position: Sitting)   Temp 36.5 °C (97.7 °F) (Oral)   Wt 68.9 kg (152 lb)   BMI 27.80 kg/m²     Physical Exam  General: NAD, well appearing female  Head: normocephalic  Heart: RRR  Lungs: CTAB    Assessment/Plan   Problem List Items Addressed This Visit             ICD-10-CM    Diabetes mellitus type II, non insulin dependent (Multi) - Primary E11.9    Relevant Medications    blood-glucose meter misc    lancets misc    blood sugar diagnostic (Blood Glucose Test) strip    Other Relevant Orders    POCT Fingerstick Glucose manually resulted (Completed)    POCT glycosylated hemoglobin (Hb A1C) manually resulted (Completed)    Referral to Clinical Pharmacy     Other Visit Diagnoses         Codes    PMR (polymyalgia rheumatica) (Multi)     M35.3          IO HbA1C 11.6%. Discussed starting Lantus while she is still on prednisone for her PMR at 10 units at bedtime. Referral to clinical pharmacy placed for assistance with cost and teaching.

## 2024-05-07 NOTE — PROGRESS NOTES
"[facility] Pharmacy Consult  Kristin Charles \"Edel\" is a 79 y.o. female was referred to Clinical Pharmacy Team for a Pharmacy consult.  The patient was referred for their Diabetes.    Referring Provider: Melanie Muñoz DO    Subjective   Allergies   Allergen Reactions    Other Other    Sulfa (Sulfonamide Antibiotics) Unknown    Miconazole Rash    Neomycin-Bacitracin-Polymyxin Rash       [facility] - [place], [place] - [address]  [address] [facility] [address]  Phone: [phone] Fax: [phone]    [facility]  [address], [address]  [facility] [address]  Phone: [phone] Fax: [phone]    [facility]  [address], [address]  [facility] [address]  Phone: [phone] Fax: [phone]      Diabetes  She presents for her initial diabetic visit. She has type 2 diabetes mellitus. There are no hypoglycemic associated symptoms. There are no diabetic associated symptoms. There are no hypoglycemic complications. Symptoms are stable. She is compliant with treatment all of the time. She is following a generally healthy diet. Her breakfast blood glucose range is generally >200 mg/dl.     Patient is here for a visit in regards to starting Insulin for high dose steroid use. Reported her Lantus was $97 and she can not afford it. Currently enrolled in [facility] and would like help in the cost of her Insulin. Needs help in learning how to use her insulin pens and testing supplies.    Review of Systems    Objective     There were no vitals taken for this visit.     LAB  Lab Results   Component Value Date    BILITOT 0.5 08/30/2024    CALCIUM 9.1 08/30/2024    CO2 29 08/30/2024     08/30/2024    CREATININE 0.96 08/30/2024    GLUCOSE 122 (H) 08/30/2024    ALKPHOS 44 08/30/2024    K 3.9 08/30/2024    PROT 6.0 (L) 08/30/2024     08/30/2024    AST 13 08/30/2024    ALT 11 08/30/2024    BUN 22 08/30/2024    ANIONGAP 11 08/30/2024    MG 2.03 06/18/2024    PHOS " "3.3 10/07/2022    ALBUMIN 3.4 08/30/2024    GFRF CANCELED 05/27/2023    GFRMALE CANCELED 05/27/2023     Lab Results   Component Value Date    TRIG 136 11/15/2023    CHOL 171 11/15/2023    LDLCALC 90 11/15/2023    HDL 53.6 11/15/2023     Lab Results   Component Value Date    HGBA1C 11.6 (A) 05/07/2024       Current Outpatient Medications on File Prior to Visit   Medication Sig Dispense Refill    aspirin 81 mg EC tablet Take 1 tablet (81 mg) by mouth once daily.      biotin 10 mg tablet Take 1 tablet (10 mg) by mouth once daily.      lancets misc Please check blood sugar 2 times daily 100 each 1    metoprolol succinate XL (Toprol-XL) 25 mg 24 hr tablet Take 1 tablet (25 mg) by mouth once daily. Do not crush or chew. 90 tablet 3    sacubitriL-valsartan (Entresto) 24-26 mg tablet Take 1 tablet by mouth once daily. (Patient taking differently: Take 0.5 tablets by mouth 2 times a day.) 90 tablet 3    traZODone (Desyrel) 50 mg tablet Take 1 tablet (50 mg) by mouth as needed at bedtime.       No current facility-administered medications on file prior to visit.        DRUG INTERACTIONS  - No drug interaction was found at this visit    SECONDARY PREVENTION  - Statin? yes  - ACE-I/ARB? yes    Assessment/Plan   Problem List Items Addressed This Visit       Diabetes mellitus, type II, insulin dependent (Multi)     START Tresiba 10 units subcutaneous once weekly  [facility] Eligible prescription sent over to pt's preferred  Pharmacy to reduce copay to $0  Tresiba 10 units subcutaneous once weekly  Insulin Pen needles  Pt has hyperglycemia due to steroid use, will start at low dose of 10 units and titrate as appropriate  Pt to come into the office tomorrow to show how to inject insulin pen  FUV in 4 weeks.         Relevant Medications    pen needle, diabetic 31 gauge x 5/16\" needle        Continue all meds under the continuation of care with the referring provider and clinical pharmacy team.    Luis Pelaez, CatieD     Verbal consent to " manage patient's drug therapy was obtained from [the patient and/or an individual authorized to act on behalf of a patient]. They were informed they may decline to participate or withdraw from participation in pharmacy services at any time.

## 2024-05-08 ENCOUNTER — PHARMACY VISIT (OUTPATIENT)
Dept: PHARMACY | Facility: CLINIC | Age: 79
End: 2024-05-08
Payer: COMMERCIAL

## 2024-05-08 ENCOUNTER — CLINICAL SUPPORT (OUTPATIENT)
Dept: PRIMARY CARE | Facility: CLINIC | Age: 79
End: 2024-05-08
Payer: MEDICARE

## 2024-05-08 PROCEDURE — RXMED WILLOW AMBULATORY MEDICATION CHARGE

## 2024-05-08 RX ORDER — INSULIN DEGLUDEC 100 U/ML
10 INJECTION, SOLUTION SUBCUTANEOUS NIGHTLY
Qty: 15 ML | Refills: 3 | Status: SHIPPED | OUTPATIENT
Start: 2024-05-08 | End: 2024-06-11 | Stop reason: DRUGHIGH

## 2024-05-08 RX ORDER — PEN NEEDLE, DIABETIC 30 GX3/16"
NEEDLE, DISPOSABLE MISCELLANEOUS
Qty: 100 EACH | Refills: 11 | Status: SHIPPED | OUTPATIENT
Start: 2024-05-08 | End: 2025-05-08

## 2024-05-08 NOTE — PROGRESS NOTES
"[facility] Pharmacy Consult  Kristin Charles \"Edel\" is a 78 y.o. female was referred to Clinical Pharmacy Team for a Pharmacy consult.  The patient was referred for their Diabetes.    Referring Provider: Melanie Muñoz DO    Subjective   Allergies   Allergen Reactions    Morphine Unknown    Other Other    Sulfa (Sulfonamide Antibiotics) Unknown    Miconazole Rash    Neomycin-Bacitracin-Polymyxin Rash       [facility] - [place], [place] - [address] [facility]  [address] [facility] [address]  Phone: [phone] Fax: [phone]    [facility] - [place], [place] - [address]  [address]  [address]  [facility] [address]  Phone: [phone] Fax: [phone]    [facility]  [address], [address]  [facility] [address]  Phone: [phone] Fax: [phone]      HPI    Patient is here for a insulin pen demonstration.     Review of Systems    Objective     There were no vitals taken for this visit.     LAB  Lab Results   Component Value Date    BILITOT 0.7 03/16/2024    CALCIUM 9.2 04/11/2024    CO2 27 04/11/2024     04/11/2024    CREATININE 0.94 04/11/2024    GLUCOSE 188 (H) 04/11/2024    ALKPHOS 57 03/16/2024    K 4.0 04/11/2024    PROT 6.1 (L) 03/16/2024     04/11/2024    AST 12 03/16/2024    ALT 15 03/16/2024    BUN 22 04/11/2024    ANIONGAP 15 04/11/2024    MG 2.33 11/01/2019    PHOS 3.3 10/07/2022    ALBUMIN 3.6 03/16/2024    GFRF CANCELED 05/27/2023    GFRMALE CANCELED 05/27/2023     Lab Results   Component Value Date    TRIG 136 11/15/2023    CHOL 171 11/15/2023    LDLCALC 90 11/15/2023    HDL 53.6 11/15/2023     Lab Results   Component Value Date    HGBA1C 11.6 (A) 05/07/2024       Current Outpatient Medications on File Prior to Visit   Medication Sig Dispense Refill    aspirin 81 mg EC tablet Take 1 tablet (81 mg) by mouth once daily.      biotin 10 mg tablet Take 1 tablet (10 mg) by mouth once daily.      blood " "sugar diagnostic (Blood Glucose Test) strip Use 1 strip 2 times a day to test blood sugar. 100 strip 1    blood-glucose meter misc Please check blood sugars 2 times daily 1 each 0    calcium carbonate-vitamin D3 600 mg-5 mcg (200 unit) tablet Take 1 tablet by mouth 2 times a day.      celecoxib (CeleBREX) 200 mg capsule Take 1 capsule (200 mg) by mouth once daily. With a meal      empagliflozin (Jardiance) 25 mg Take 1 tablet (25 mg) by mouth once daily. 90 tablet 3    insulin glargine (Lantus) 100 unit/mL (3 mL) pen Inject 10 Units under the skin once daily at bedtime. Take as directed per insulin instructions. 15 mL 3    lancets misc Please check blood sugar 2 times daily 100 each 1    levothyroxine (Synthroid, Levoxyl) 125 mcg tablet Take 1 tablet (125 mcg) by mouth once daily. 30 tablet 11    metoprolol succinate XL (Toprol-XL) 25 mg 24 hr tablet Take 1 tablet (25 mg) by mouth once daily. Do not crush or chew. 90 tablet 3    mometasone (Elocon) 0.1 % lotion Use 3 drops topically to affected ear PRN itching 60 mL 1    multivitamin-min-iron-FA-vit K 45 mg iron- 800 mcg-120 mcg capsule Take 1 capsule by mouth once daily.      nystatin (Mycostatin) cream Apply 2x daily x 1 month; repeat if needed 30 g 2    omega 3-dha-epa-fish oil (Fish OiL) 1,000 mg (120 mg-180 mg) capsule Take 120 mg by mouth once daily.      pantoprazole (ProtoNix) 40 mg EC tablet Take 1 tablet (40 mg) by mouth once daily. Do not crush, chew, or split. 30 tablet 1    pen needle, diabetic 31 gauge x 5/16\" needle Use as directed to inject insulin once daily 100 each 11    predniSONE (Deltasone) 5 mg tablet Take 2.5 tablets (12.5 mg) by mouth once daily. 75 tablet 2    sacubitriL-valsartan (Entresto) 24-26 mg tablet Take 1 tablet by mouth once daily. 90 tablet 3    traZODone (Desyrel) 50 mg tablet Take 1 tablet (50 mg) by mouth as needed at bedtime.      [DISCONTINUED] insulin glargine (Lantus Solostar U-100 Insulin) 100 unit/mL (3 mL) pen Inject 10 " Units under the skin once daily at bedtime. Take as directed per insulin instructions. 15 mL 1     No current facility-administered medications on file prior to visit.        Assessment/Plan:  Went over symptoms of hypoglycemia and hyperglycemia  Went over diet and exercise control  Went over how to inject and store her insulin pen.   Pt did not have her glucose testing supplies yet. Pt to bring in her testing supply next week to calibrate and show how to use her glucose monitoring machine.     Continue all meds under the continuation of care with the referring provider and clinical pharmacy team.    Luis Pelaez PharmD     Verbal consent to manage patient's drug therapy was obtained from [the patient and/or an individual authorized to act on behalf of a patient]. They were informed they may decline to participate or withdraw from participation in pharmacy services at any time.

## 2024-05-08 NOTE — ASSESSMENT & PLAN NOTE
START Tresiba 10 units subcutaneous once weekly  [facility] Eligible prescription sent over to pt's preferred  Pharmacy to reduce copay to $0  Tresiba 10 units subcutaneous once weekly  Insulin Pen needles  Pt has hyperglycemia due to steroid use, will start at low dose of 10 units and titrate as appropriate  Pt to come into the office tomorrow to show how to inject insulin pen  FUV in 4 weeks.

## 2024-05-10 ENCOUNTER — TELEPHONE (OUTPATIENT)
Dept: PRIMARY CARE | Facility: CLINIC | Age: 79
End: 2024-05-10
Payer: MEDICARE

## 2024-05-10 ENCOUNTER — PHARMACY VISIT (OUTPATIENT)
Dept: PHARMACY | Facility: CLINIC | Age: 79
End: 2024-05-10
Payer: COMMERCIAL

## 2024-05-10 PROCEDURE — RXMED WILLOW AMBULATORY MEDICATION CHARGE

## 2024-05-10 NOTE — TELEPHONE ENCOUNTER
YONY:   She wanted to let you know that she is not able to get the needles for her insulin until Tuesday. [facility] does not have them in stock and her local [facility] is going to be to expensive.

## 2024-05-13 ENCOUNTER — OFFICE VISIT (OUTPATIENT)
Dept: CARDIAC SURGERY | Facility: CLINIC | Age: 79
End: 2024-05-13
Payer: MEDICARE

## 2024-05-13 ENCOUNTER — OFFICE VISIT (OUTPATIENT)
Dept: CARDIOLOGY | Facility: CLINIC | Age: 79
End: 2024-05-13
Payer: MEDICARE

## 2024-05-13 VITALS
BODY MASS INDEX: 27.6 KG/M2 | HEART RATE: 57 BPM | DIASTOLIC BLOOD PRESSURE: 59 MMHG | HEIGHT: 62 IN | SYSTOLIC BLOOD PRESSURE: 111 MMHG | RESPIRATION RATE: 16 BRPM | OXYGEN SATURATION: 96 % | TEMPERATURE: 97.4 F | WEIGHT: 150 LBS

## 2024-05-13 VITALS
OXYGEN SATURATION: 96 % | BODY MASS INDEX: 27.6 KG/M2 | HEART RATE: 57 BPM | DIASTOLIC BLOOD PRESSURE: 59 MMHG | WEIGHT: 150 LBS | SYSTOLIC BLOOD PRESSURE: 111 MMHG | HEIGHT: 62 IN | TEMPERATURE: 97.4 F | RESPIRATION RATE: 16 BRPM

## 2024-05-13 DIAGNOSIS — E11.65 POORLY CONTROLLED DIABETES MELLITUS (MULTI): ICD-10-CM

## 2024-05-13 DIAGNOSIS — I35.1 SEVERE AORTIC REGURGITATION: Primary | ICD-10-CM

## 2024-05-13 DIAGNOSIS — I35.0 NONRHEUMATIC AORTIC VALVE STENOSIS: Primary | ICD-10-CM

## 2024-05-13 DIAGNOSIS — I50.20 HFREF (HEART FAILURE WITH REDUCED EJECTION FRACTION) (MULTI): ICD-10-CM

## 2024-05-13 DIAGNOSIS — I35.0 MODERATE TO SEVERE AORTIC STENOSIS: ICD-10-CM

## 2024-05-13 DIAGNOSIS — F19.20 STEROID DEPENDENCE (MULTI): ICD-10-CM

## 2024-05-13 PROCEDURE — 3074F SYST BP LT 130 MM HG: CPT | Performed by: THORACIC SURGERY (CARDIOTHORACIC VASCULAR SURGERY)

## 2024-05-13 PROCEDURE — 1036F TOBACCO NON-USER: CPT | Performed by: INTERNAL MEDICINE

## 2024-05-13 PROCEDURE — 1159F MED LIST DOCD IN RCRD: CPT | Performed by: THORACIC SURGERY (CARDIOTHORACIC VASCULAR SURGERY)

## 2024-05-13 PROCEDURE — 99215 OFFICE O/P EST HI 40 MIN: CPT | Performed by: INTERNAL MEDICINE

## 2024-05-13 PROCEDURE — 1159F MED LIST DOCD IN RCRD: CPT | Performed by: INTERNAL MEDICINE

## 2024-05-13 PROCEDURE — 1123F ACP DISCUSS/DSCN MKR DOCD: CPT | Performed by: THORACIC SURGERY (CARDIOTHORACIC VASCULAR SURGERY)

## 2024-05-13 PROCEDURE — 1126F AMNT PAIN NOTED NONE PRSNT: CPT | Performed by: INTERNAL MEDICINE

## 2024-05-13 PROCEDURE — 1160F RVW MEDS BY RX/DR IN RCRD: CPT | Performed by: THORACIC SURGERY (CARDIOTHORACIC VASCULAR SURGERY)

## 2024-05-13 PROCEDURE — 1160F RVW MEDS BY RX/DR IN RCRD: CPT | Performed by: INTERNAL MEDICINE

## 2024-05-13 PROCEDURE — 1123F ACP DISCUSS/DSCN MKR DOCD: CPT | Performed by: INTERNAL MEDICINE

## 2024-05-13 PROCEDURE — 1036F TOBACCO NON-USER: CPT | Performed by: THORACIC SURGERY (CARDIOTHORACIC VASCULAR SURGERY)

## 2024-05-13 PROCEDURE — 99205 OFFICE O/P NEW HI 60 MIN: CPT | Performed by: THORACIC SURGERY (CARDIOTHORACIC VASCULAR SURGERY)

## 2024-05-13 PROCEDURE — 3074F SYST BP LT 130 MM HG: CPT | Performed by: INTERNAL MEDICINE

## 2024-05-13 PROCEDURE — 99215 OFFICE O/P EST HI 40 MIN: CPT | Performed by: THORACIC SURGERY (CARDIOTHORACIC VASCULAR SURGERY)

## 2024-05-13 PROCEDURE — 3078F DIAST BP <80 MM HG: CPT | Performed by: INTERNAL MEDICINE

## 2024-05-13 PROCEDURE — 3078F DIAST BP <80 MM HG: CPT | Performed by: THORACIC SURGERY (CARDIOTHORACIC VASCULAR SURGERY)

## 2024-05-13 PROCEDURE — 1126F AMNT PAIN NOTED NONE PRSNT: CPT | Performed by: THORACIC SURGERY (CARDIOTHORACIC VASCULAR SURGERY)

## 2024-05-13 RX ORDER — INSULIN GLARGINE 100 [IU]/ML
10 INJECTION, SOLUTION SUBCUTANEOUS NIGHTLY
COMMUNITY
End: 2024-06-11

## 2024-05-13 RX ORDER — FAMOTIDINE 10 MG/1
10 TABLET ORAL DAILY PRN
COMMUNITY

## 2024-05-13 SDOH — ECONOMIC STABILITY: FOOD INSECURITY: WITHIN THE PAST 12 MONTHS, YOU WORRIED THAT YOUR FOOD WOULD RUN OUT BEFORE YOU GOT MONEY TO BUY MORE.: NEVER TRUE

## 2024-05-13 SDOH — ECONOMIC STABILITY: FOOD INSECURITY: WITHIN THE PAST 12 MONTHS, THE FOOD YOU BOUGHT JUST DIDN'T LAST AND YOU DIDN'T HAVE MONEY TO GET MORE.: NEVER TRUE

## 2024-05-13 ASSESSMENT — ENCOUNTER SYMPTOMS
LOSS OF SENSATION IN FEET: 0
LOSS OF SENSATION IN FEET: 0
OCCASIONAL FEELINGS OF UNSTEADINESS: 0
DEPRESSION: 0
OCCASIONAL FEELINGS OF UNSTEADINESS: 0
DEPRESSION: 0

## 2024-05-13 ASSESSMENT — LIFESTYLE VARIABLES
HOW OFTEN DO YOU HAVE A DRINK CONTAINING ALCOHOL: NEVER
HOW OFTEN DO YOU HAVE SIX OR MORE DRINKS ON ONE OCCASION: NEVER
SKIP TO QUESTIONS 9-10: 1
AUDIT-C TOTAL SCORE: 0
HOW MANY STANDARD DRINKS CONTAINING ALCOHOL DO YOU HAVE ON A TYPICAL DAY: PATIENT DOES NOT DRINK

## 2024-05-13 ASSESSMENT — COLUMBIA-SUICIDE SEVERITY RATING SCALE - C-SSRS
2. HAVE YOU ACTUALLY HAD ANY THOUGHTS OF KILLING YOURSELF?: NO
1. IN THE PAST MONTH, HAVE YOU WISHED YOU WERE DEAD OR WISHED YOU COULD GO TO SLEEP AND NOT WAKE UP?: NO
6. HAVE YOU EVER DONE ANYTHING, STARTED TO DO ANYTHING, OR PREPARED TO DO ANYTHING TO END YOUR LIFE?: NO

## 2024-05-13 ASSESSMENT — PAIN SCALES - GENERAL
PAINLEVEL: 0-NO PAIN
PAINLEVEL: 0-NO PAIN

## 2024-05-13 NOTE — PROGRESS NOTES
Cardiologist: Dr Cruz Nicole    Chief Complaint:   Here for evaluation for aortic valve therapies     History Of Present Illness:    Edel Charles is a 78 y.o. 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 She was noted to have have aortic valve disease during work up for a murmur heard at her PCP's office. On transthoracic echocardiogram ( per report, images unavailable for review)  Independent with ADLs, still drives, she is active and completes household chores indoors and outdoors. However she states that she takes longer with chores, yard work and long walks due to exertional fatigue and dyspnea. No chest pain, no syncope or presyncope, no orthopnea, no PND, no leg swelling. No heart failure hospitalization.     Recent prednisone for arthralgias which has helped  with symptoms currently 10mg daily and being tapered.     She was evaluated at [facility] and deemed to be moderate/severe she preferred to wait for symptoms to occur. She was also being triaged with brain natiuretic peptide given symptom burden which was found to be elevated. However she decided to seek a second opinion.    She lives at home but will like to down size. Currently lives with her son. Delaying her move to a smaller house until aortic valve disease is addressed and son's situation is sorted out     Work up so far.    EKG 05/15/2023- HR 60bpm, LAFB.   Cath [facility] 11/27/2023 ( images reviewed in [facility]) - No obstructive CAD  TTE ( per report) LVEF 38% +/- 5% normal RV size and function, Moderate aortic regurgitation, moderate aortic stenosis KAREEM 1.02cm2 ( AVAi 0.56cm2/m2 => severe), mean gradient 15 mmHg, DI 0.33  MICKEY 3/19/2024 ( images reviewed in [facility]) - LVEF 40-45%, severe aortic regurgitation, severe aortic stenosis ( planimetered KAREEM 0.99cm2, indexed to BSA 0.56cm2/m2 where severe is AVAi < 0.06cm2/m2) moderate mitral regurgitation. No other significant valve  "abnormalities.  CTA 1/2024 - AVCS per report - 247 Agaston unit \"likely underestimated relative to standard threshold 130 HU\"  repeat pending below  ** CTA TAVR 5/14/2024- AVCS 988 Agaston units, Annulus suitable for 29 Evolut FX or 26 Scottie ultra adequate iliofemoral accesses bilaterally for either prosthesis**  NM SPECT for amyloid - Not consistent with TTR amyloidosis  Dental clearance - completed  PFTs 1/2024 - Mld obstruction, normal diffusion capacity  NT Pro BNP 1289 Feb 2024     PAST MEDICAL HISTORY  HFrEF, LVEF 35-40% ( TTE 2/9/2024)  Hypertension  Hyperlipidemia  Hypothyroidism  LF-LG Aortic stenosis  Bilateral total knee arthroplasties  Polymyalgia rheumatica on prednisone     Last Recorded Vitals:  Vitals:    05/13/24 1447   BP: 111/59   Pulse: 57   Resp: 16   Temp: 36.3 °C (97.4 °F)   SpO2: 96%   Weight: 68 kg (150 lb)   Height: 1.575 m (5' 2\")       Past Medical History:  She has a past medical history of Actinic keratosis, Acute candidiasis of vulva and vagina, Acute laryngitis (03/20/2018), Baker's cyst of knee (04/06/2023), Bitten or stung by nonvenomous insect and other nonvenomous arthropods, initial encounter (09/02/2015), Candidiasis, unspecified (10/13/2017), Chronic ethmoidal sinusitis (11/24/2020), Chronic maxillary sinusitis (08/06/2020), Disease of thyroid gland, Eczema, Heart murmur, Heart valve disease, Laceration without foreign body of unspecified forearm, initial encounter (01/09/2022), Lumbago with sciatica, left side (03/29/2019), 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), Nail disorder, unspecified (08/08/2023), Neoplasm of uncertain behavior of skin (08/08/2023), Obesity (01/19/2015), Onychomycosis (04/06/2023), Open wound of hip and thigh with tendon involvement (05/24/2018), Overweight (BMI 25.0-29.9) (04/06/2023), Personal history of diseases of the skin and subcutaneous tissue (01/09/2022), Personal history of diseases of the skin and subcutaneous tissue (09/02/2015), Personal history " of other diseases of the musculoskeletal system and connective tissue, Personal history of other diseases of the nervous system and sense organs, Personal history of other diseases of the nervous system and sense organs (09/20/2019), Personal history of other endocrine, nutritional and metabolic disease, Personal history of other infectious and parasitic diseases (09/23/2017), Personal history of other specified conditions (03/06/2020), Rash and other nonspecific skin eruption (06/15/2015), Rhinitis (10 years), Sciatica (12/11/2014), 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    Past Surgical History:  She has a past surgical history that includes Cataract extraction (06/30/2014); Other surgical history (01/09/2022); Tonsillectomy; Dilation and curettage of uterus; Total knee arthroplasty; Joint replacement; and Skin biopsy.      Social History:  She reports that she quit smoking about 59 years ago. Her smoking use included cigarettes. She started smoking about 64 years ago. She has a 1.3 pack-year smoking history. She has never been exposed to tobacco smoke. She has never used smokeless tobacco. She reports that she does not drink alcohol and does not use drugs.    Family History:  Family History   Problem Relation Name Age of Onset    Cancer Sister Akila         Allergies:  Morphine, Other, Sulfa (sulfonamide antibiotics), Miconazole, and Neomycin-bacitracin-polymyxin    Outpatient Medications:  Current Outpatient Medications   Medication Instructions    aspirin 81 mg, oral, Daily RT    biotin 10 mg tablet 1 tablet, oral, Daily    blood sugar diagnostic (Blood Glucose Test) strip Use 1 strip 2 times a day to test blood sugar.    blood-glucose meter  "misc Please check blood sugars 2 times daily    celecoxib (CeleBREX) 200 mg capsule 1 capsule, oral, Daily, With a meal    empagliflozin (JARDIANCE) 25 mg, oral, Daily    famotidine (PEPCID) 10 mg, oral, Daily PRN    lancets misc Please check blood sugar 2 times daily    Lantus U-100 Insulin 10 Units, subcutaneous, Nightly, Take as directed per insulin instructions.    levothyroxine (SYNTHROID, LEVOXYL) 125 mcg, oral, Daily    metoprolol succinate XL (TOPROL-XL) 25 mg, oral, Daily, Do not crush or chew.    mometasone (Elocon) 0.1 % lotion Use 3 drops topically to affected ear PRN itching    nystatin (Mycostatin) cream Apply 2x daily x 1 month; repeat if needed    pantoprazole (PROTONIX) 40 mg, oral, Daily, Do not crush, chew, or split.    pen needle, diabetic 31 gauge x 5/16\" needle Use as directed to inject insulin once daily    predniSONE (DELTASONE) 12.5 mg, oral, Daily    sacubitriL-valsartan (Entresto) 24-26 mg tablet 1 tablet, oral, Daily    traZODone (Desyrel) 50 mg tablet 1 tablet, oral, Nightly PRN    Tresiba FlexTouch U-100 10 Units, subcutaneous, Nightly, Take as directed per insulin instructions.       Physical Exam:  Physical Exam  Constitutional:       Appearance: Normal appearance.   HENT:      Head: Normocephalic and atraumatic.   Cardiovascular:      Rate and Rhythm: Normal rate and regular rhythm.   Pulmonary:      Effort: Pulmonary effort is normal.   Musculoskeletal:         General: No swelling.   Skin:     General: Skin is warm.   Neurological:      General: No focal deficit present.      Mental Status: She is alert.   Psychiatric:         Mood and Affect: Mood normal.           Last Labs:  CBC -  Lab Results   Component Value Date    WBC 6.2 03/16/2024    HGB 14.0 03/16/2024    HCT 43.9 03/16/2024    MCV 93 03/16/2024     03/16/2024       CMP -  Lab Results   Component Value Date    CALCIUM 9.2 04/11/2024    PHOS 3.3 10/07/2022    PROT 6.1 (L) 03/16/2024    ALBUMIN 3.6 03/16/2024    AST " 12 03/16/2024    ALT 15 03/16/2024    ALKPHOS 57 03/16/2024    BILITOT 0.7 03/16/2024       LIPID PANEL -   Lab Results   Component Value Date    CHOL 171 11/15/2023    TRIG 136 11/15/2023    HDL 53.6 11/15/2023    CHHDL 3.2 11/15/2023    LDLF 77 11/05/2021    VLDL 27 11/15/2023    NHDL 117 11/15/2023       RENAL FUNCTION PANEL -   Lab Results   Component Value Date    GLUCOSE 188 (H) 04/11/2024     04/11/2024    K 4.0 04/11/2024     04/11/2024    CO2 27 04/11/2024    ANIONGAP 15 04/11/2024    BUN 22 04/11/2024    CREATININE 0.94 04/11/2024    GFRMALE CANCELED 05/27/2023    CALCIUM 9.2 04/11/2024    PHOS 3.3 10/07/2022    ALBUMIN 3.6 03/16/2024        Lab Results   Component Value Date    HGBA1C 11.6 (A) 05/07/2024       ASSESSMENT AND PLAN  Severe aortic regurgitation.  Severe low flow, low gradient low LVEF aortic stenosis.  HFrEF, LVEF 40% secondary to non-ischemic cardiomyopathy.  Polymyalgia rheumatica on chronic steroids  Poorly controlled DM II secondary to #4, Hgb A1C 11.6 May 2024    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 She is symptomatic. She has elevated brain natriuretic peptide. She has systolic dysfunction associated with these valve diseases in the absence of demonstrable ischemia or previous history of non-ischemic cardiomyopathy.    The rationale for treatment ie symptoms ,decreased LVEF, elevated basic metabolic panel were discussed. The options of therapy transcatheter and surgical were also discussed. She has expressed a preference for transcatheter if anatomically suitable.    A repeat CTA TAVR was done after this visit which demonstrated anatomic suitability for transcatheter aortic valve replacement.    No changes made to her medications.    After this and a multidisciplinary valve team discussion next steps will be relayed to Ms Charles.    In the meantime guideline directed medical therapy under the direction of Dr Nicole.    Thanks for the opportunity to participate  in her care.    Oli Lazaro MD

## 2024-05-13 NOTE — PROGRESS NOTES
Chief Complaint  Fatigue    HPI:   Ms. Kristin Charles is an 78 y.o. female, who is a patient of Dr. Melanie Muñoz, DO   I have been asked to see them by Cruz Ordaz to evaluate aortic stenosis and aortic regurgitation.  She was being evaluated at [facility] for mixed aortic valve disease, getting worked up for TAVR.  She has sought a second opinion here.  At the time of her initial evaluation, she had no significant symptoms.  Currently, she well, but does complain that she had to split her yard work up into 2 sessions rather than doing all at 1 session.  Also, in the intervening time.  She has had a spontaneous episode of polymyalgia rheumatica, and has been started on steroid therapy and is on a steroid taper with her rheumatologist now.  It appears that she has no residual pain at this point.      Past Medical History:   Diagnosis Date    Actinic keratosis     Acute candidiasis of vulva and vagina     Yeast infection of the vagina    Acute laryngitis 03/20/2018    Acute laryngitis    Lee's cyst of knee 04/06/2023    Bitten or stung by nonvenomous insect and other nonvenomous arthropods, initial encounter 09/02/2015    Bug bite with infection    Candidiasis, unspecified 10/13/2017    Yeast infection    Chronic ethmoidal sinusitis 11/24/2020    Chronic ethmoidal sinusitis    Chronic maxillary sinusitis 08/06/2020    Chronic maxillary sinusitis    Disease of thyroid gland     Eczema     Heart murmur     Heart valve disease     Laceration without foreign body of unspecified forearm, initial encounter 01/09/2022    Forearm laceration    Lumbago with sciatica, left side 03/29/2019    Chronic bilateral low back pain with left-sided sciatica    Lymphocytic colitis 05/05/2023    Colonoscopy: 4/24/2023    Nail disorder, unspecified 08/08/2023    Neoplasm of uncertain behavior of skin 08/08/2023    Obesity 01/19/2015    Onychomycosis 04/06/2023    Open wound of hip and thigh with tendon involvement 05/24/2018     Overweight (BMI 25.0-29.9) 04/06/2023    Personal history of diseases of the skin and subcutaneous tissue 01/09/2022    History of contact dermatitis    Personal history of diseases of the skin and subcutaneous tissue 09/02/2015    History of cellulitis    Personal history of other diseases of the musculoskeletal system and connective tissue     Personal history of arthritis    Personal history of other diseases of the nervous system and sense organs     History of cataract    Personal history of other diseases of the nervous system and sense organs 09/20/2019    History of sciatica    Personal history of other endocrine, nutritional and metabolic disease     History of hypothyroidism    Personal history of other infectious and parasitic diseases 09/23/2017    History of erysipelas    Personal history of other specified conditions 03/06/2020    History of epistaxis    Rash and other nonspecific skin eruption 06/15/2015    Skin rash    Rhinitis 10 years    Sciatica 12/11/2014    Status post joint replacement 06/19/2023    Strain of muscle, fascia and tendon of the posterior muscle group at thigh level, unspecified thigh, initial encounter 05/01/2018    Hamstring strain    Tinnitus 10 years    Vaginal discharge 04/06/2023    Varus deformity, not elsewhere classified, unspecified knee 03/06/2019    Acquired genu varum       Past Surgical History:   Procedure Laterality Date    CATARACT EXTRACTION  06/30/2014    Cataract Surgery    DILATION AND CURETTAGE OF UTERUS      JOINT REPLACEMENT      OTHER SURGICAL HISTORY  01/09/2022    Colonoscopy    SKIN BIOPSY      TONSILLECTOMY      TOTAL KNEE ARTHROPLASTY         Family History   Problem Relation Name Age of Onset    Cancer Sister Kaila        Social History     Socioeconomic History    Marital status:      Spouse name: Not on file    Number of children: Not on file    Years of education: Not on file    Highest education level: Not on file   Occupational  History    Not on file   Tobacco Use    Smoking status: Former     Current packs/day: 0.00     Average packs/day: 0.3 packs/day for 5.0 years (1.3 ttl pk-yrs)     Types: Cigarettes     Start date:      Quit date:      Years since quittin.4     Passive exposure: Never    Smokeless tobacco: Never   Vaping Use    Vaping status: Never Used   Substance and Sexual Activity    Alcohol use: Never    Drug use: Never    Sexual activity: Defer   Other Topics Concern    Not on file   Social History Narrative    Not on file     Social Determinants of Health     Financial Resource Strain: Not on file   Food Insecurity: Not on file   Transportation Needs: Not on file   Physical Activity: Not on file   Stress: Not on file   Social Connections: Not on file   Intimate Partner Violence: Not on file   Housing Stability: Not on file       Allergies   Allergen Reactions    Morphine Unknown    Other Other    Sulfa (Sulfonamide Antibiotics) Unknown    Miconazole Rash    Neomycin-Bacitracin-Polymyxin Rash       Outpatient Encounter Medications as of 2024   Medication Sig Dispense Refill    aspirin 81 mg EC tablet Take 1 tablet (81 mg) by mouth once daily.      biotin 10 mg tablet Take 1 tablet (10 mg) by mouth once daily.      blood sugar diagnostic (Blood Glucose Test) strip Use 1 strip 2 times a day to test blood sugar. 100 strip 1    blood-glucose meter misc Please check blood sugars 2 times daily 1 each 0    calcium carbonate-vitamin D3 600 mg-5 mcg (200 unit) tablet Take 1 tablet by mouth 2 times a day.      celecoxib (CeleBREX) 200 mg capsule Take 1 capsule (200 mg) by mouth once daily. With a meal      empagliflozin (Jardiance) 25 mg Take 1 tablet (25 mg) by mouth once daily. 90 tablet 3    insulin degludec (Tresiba FlexTouch) 100 unit/mL (3 mL) injection Inject 10 Units under the skin once daily at bedtime. Take as directed per insulin instructions. 15 mL 3    lancets misc Please check blood sugar 2 times daily 100  "each 1    levothyroxine (Synthroid, Levoxyl) 125 mcg tablet Take 1 tablet (125 mcg) by mouth once daily. 30 tablet 11    metoprolol succinate XL (Toprol-XL) 25 mg 24 hr tablet Take 1 tablet (25 mg) by mouth once daily. Do not crush or chew. 90 tablet 3    mometasone (Elocon) 0.1 % lotion Use 3 drops topically to affected ear PRN itching 60 mL 1    multivitamin-min-iron-FA-vit K 45 mg iron- 800 mcg-120 mcg capsule Take 1 capsule by mouth once daily.      nystatin (Mycostatin) cream Apply 2x daily x 1 month; repeat if needed 30 g 2    omega 3-dha-epa-fish oil (Fish OiL) 1,000 mg (120 mg-180 mg) capsule Take 120 mg by mouth once daily.      pantoprazole (ProtoNix) 40 mg EC tablet Take 1 tablet (40 mg) by mouth once daily. Do not crush, chew, or split. 30 tablet 1    pen needle, diabetic 31 gauge x 5/16\" needle Use as directed to inject insulin once daily 100 each 11    predniSONE (Deltasone) 5 mg tablet Take 2.5 tablets (12.5 mg) by mouth once daily. 75 tablet 2    sacubitriL-valsartan (Entresto) 24-26 mg tablet Take 1 tablet by mouth once daily. 90 tablet 3    traZODone (Desyrel) 50 mg tablet Take 1 tablet (50 mg) by mouth as needed at bedtime.      [DISCONTINUED] insulin glargine (Lantus Solostar U-100 Insulin) 100 unit/mL (3 mL) pen Inject 10 Units under the skin once daily at bedtime. Take as directed per insulin instructions. 15 mL 1     No facility-administered encounter medications on file as of 5/13/2024.       Physical Exam  Constitutional:       Appearance: Normal appearance.   HENT:      Head: Normocephalic and atraumatic.   Eyes:      General: No scleral icterus.     Pupils: Pupils are equal, round, and reactive to light.   Cardiovascular:      Rate and Rhythm: Normal rate and regular rhythm.      Heart sounds: Murmur heard.   Pulmonary:      Effort: Pulmonary effort is normal.      Breath sounds: Normal breath sounds.   Musculoskeletal:         General: Normal range of motion.   Skin:     General: Skin is " warm and dry.   Neurological:      General: No focal deficit present.      Mental Status: She is alert.   Psychiatric:         Mood and Affect: Mood normal.         Behavior: Behavior normal.         Encounter Date: 03/21/24   ECG 12 lead (Clinic Performed)    Narrative    Resting EKG independently interpreted by me at the time of reporting   reveals NSR, normal intervals, left anterior hemiblock, voltage criteria   for LVH, possible anterior infarct indeterminate age, nonspecific T wave   abnormality       Lab Results   Component Value Date    WBC 6.2 03/16/2024    HGB 14.0 03/16/2024    HCT 43.9 03/16/2024    MCV 93 03/16/2024     03/16/2024     Lab Results   Component Value Date    GLUCOSE 188 (H) 04/11/2024    CALCIUM 9.2 04/11/2024     04/11/2024    K 4.0 04/11/2024    CO2 27 04/11/2024     04/11/2024    BUN 22 04/11/2024    CREATININE 0.94 04/11/2024     LHC: 11.27.23:  No significant CAD.     ECHO: 11/2/23:  LVEF 40%.  Severe sclerotic AV with mean AV gradient of 15 mmHg, KAREEM 1.02 and DI of 0.33.      MICKEY: 3/19/24:  Severe sclerosis of AV, KAREEM 0.8 cm2,  mod to severe AI.  Ejection fraction is moderately depressed at 45%      Assessment and Plan:   Ms. Kristin Charles is an 78 y.o. female who presents with aortic stenosis and aortic regurgitation.  This is associated with HTN, palpitations, PVD; and in the setting of anxiety, stage III CKD, GERD, hypothyroidism.  Their symptoms include Fatigue.  She has CCS class NYHA II heart failure symptoms.  Their imaging is consistent with aortic stenosis and aortic regurgitation.     We had a nice discussion regarding the indications for intervention on their valvular disease.  This included percutaneous as well as open surgical approaches. I do believe that a catheter based approach is in her best interest, given her age and co-morbidities.  She understands that the goal of this procedure will be to relieve symptoms if present, preserve left  ventricular function, and prolong life.  I discussed the specific risks of vascular access bleeding, stroke and need for pacemaker placement.  In further evaluation we will obtain Computed tomography - TAVR protocol, we will use this to determine aortic valve calcification as well as distribution.     With regards to their surgical fitness, she is a Moderate  risk candidate.  This is mostly due to her advanced age and steroid therapy.  If there is compromise of vascular access or aortic root anatomy/geometry, I would offer them an open surgical approach.

## 2024-05-13 NOTE — LETTER
May 13, 2024     Cruz Fang MD  [address]  [facility] [address]    Patient: Edel Charles   YOB: 1945   Date of Visit: 5/13/2024       Dear Dr. Cruz Fang MD:    Thank you for referring Edel Charles to me for evaluation. Below are my notes for this consultation.  If you have questions, please do not hesitate to call me. I look forward to following your patient along with you.       Sincerely,     Henry Villanueva MD      CC: DO Oli Arnold MD  ______________________________________________________________________________________    Chief Complaint  Fatigue    HPI:   Ms. Kristin Charles is an 78 y.o. female, who is a patient of Dr. Melanie Muñoz, DO   I have been asked to see them by Cruz Ordaz to evaluate aortic stenosis and aortic regurgitation.  She was being evaluated at [facility] for mixed aortic valve disease, getting worked up for TAVR.  She has sought a second opinion here.  At the time of her initial evaluation, she had no significant symptoms.  Currently, she well, but does complain that she had to split her yard work up into 2 sessions rather than doing all at 1 session.  Also, in the intervening time.  She has had a spontaneous episode of polymyalgia rheumatica, and has been started on steroid therapy and is on a steroid taper with her rheumatologist now.  It appears that she has no residual pain at this point.      Past Medical History:   Diagnosis Date   • Actinic keratosis    • Acute candidiasis of vulva and vagina     Yeast infection of the vagina   • Acute laryngitis 03/20/2018    Acute laryngitis   • Baker's cyst of knee 04/06/2023   • Bitten or stung by nonvenomous insect and other nonvenomous arthropods, initial encounter 09/02/2015    Bug bite with infection   • Candidiasis, unspecified 10/13/2017    Yeast infection   • Chronic ethmoidal sinusitis 11/24/2020    Chronic ethmoidal sinusitis   • Chronic maxillary  sinusitis 08/06/2020    Chronic maxillary sinusitis   • Disease of thyroid gland    • Eczema    • Heart murmur    • Heart valve disease    • Laceration without foreign body of unspecified forearm, initial encounter 01/09/2022    Forearm laceration   • Lumbago with sciatica, left side 03/29/2019    Chronic bilateral low back pain with left-sided sciatica   • Lymphocytic colitis 05/05/2023    Colonoscopy: 4/24/2023   • Nail disorder, unspecified 08/08/2023   • Neoplasm of uncertain behavior of skin 08/08/2023   • Obesity 01/19/2015   • Onychomycosis 04/06/2023   • Open wound of hip and thigh with tendon involvement 05/24/2018   • Overweight (BMI 25.0-29.9) 04/06/2023   • Personal history of diseases of the skin and subcutaneous tissue 01/09/2022    History of contact dermatitis   • Personal history of diseases of the skin and subcutaneous tissue 09/02/2015    History of cellulitis   • Personal history of other diseases of the musculoskeletal system and connective tissue     Personal history of arthritis   • Personal history of other diseases of the nervous system and sense organs     History of cataract   • Personal history of other diseases of the nervous system and sense organs 09/20/2019    History of sciatica   • Personal history of other endocrine, nutritional and metabolic disease     History of hypothyroidism   • Personal history of other infectious and parasitic diseases 09/23/2017    History of erysipelas   • Personal history of other specified conditions 03/06/2020    History of epistaxis   • Rash and other nonspecific skin eruption 06/15/2015    Skin rash   • Rhinitis 10 years   • Sciatica 12/11/2014   • Status post joint replacement 06/19/2023   • Strain of muscle, fascia and tendon of the posterior muscle group at thigh level, unspecified thigh, initial encounter 05/01/2018    Hamstring strain   • Tinnitus 10 years   • Vaginal discharge 04/06/2023   • Varus deformity, not elsewhere classified, unspecified  knee 2019    Acquired genu varum       Past Surgical History:   Procedure Laterality Date   • CATARACT EXTRACTION  2014    Cataract Surgery   • DILATION AND CURETTAGE OF UTERUS     • JOINT REPLACEMENT     • OTHER SURGICAL HISTORY  2022    Colonoscopy   • SKIN BIOPSY     • TONSILLECTOMY     • TOTAL KNEE ARTHROPLASTY         Family History   Problem Relation Name Age of Onset   • Cancer Sister Kaila        Social History     Socioeconomic History   • Marital status:      Spouse name: Not on file   • Number of children: Not on file   • Years of education: Not on file   • Highest education level: Not on file   Occupational History   • Not on file   Tobacco Use   • Smoking status: Former     Current packs/day: 0.00     Average packs/day: 0.3 packs/day for 5.0 years (1.3 ttl pk-yrs)     Types: Cigarettes     Start date:      Quit date:      Years since quittin.4     Passive exposure: Never   • Smokeless tobacco: Never   Vaping Use   • Vaping status: Never Used   Substance and Sexual Activity   • Alcohol use: Never   • Drug use: Never   • Sexual activity: Defer   Other Topics Concern   • Not on file   Social History Narrative   • Not on file     Social Determinants of Health     Financial Resource Strain: Not on file   Food Insecurity: Not on file   Transportation Needs: Not on file   Physical Activity: Not on file   Stress: Not on file   Social Connections: Not on file   Intimate Partner Violence: Not on file   Housing Stability: Not on file       Allergies   Allergen Reactions   • Morphine Unknown   • Other Other   • Sulfa (Sulfonamide Antibiotics) Unknown   • Miconazole Rash   • Neomycin-Bacitracin-Polymyxin Rash       Outpatient Encounter Medications as of 2024   Medication Sig Dispense Refill   • aspirin 81 mg EC tablet Take 1 tablet (81 mg) by mouth once daily.     • biotin 10 mg tablet Take 1 tablet (10 mg) by mouth once daily.     • blood sugar diagnostic (Blood  "Glucose Test) strip Use 1 strip 2 times a day to test blood sugar. 100 strip 1   • blood-glucose meter misc Please check blood sugars 2 times daily 1 each 0   • calcium carbonate-vitamin D3 600 mg-5 mcg (200 unit) tablet Take 1 tablet by mouth 2 times a day.     • celecoxib (CeleBREX) 200 mg capsule Take 1 capsule (200 mg) by mouth once daily. With a meal     • empagliflozin (Jardiance) 25 mg Take 1 tablet (25 mg) by mouth once daily. 90 tablet 3   • insulin degludec (Tresiba FlexTouch) 100 unit/mL (3 mL) injection Inject 10 Units under the skin once daily at bedtime. Take as directed per insulin instructions. 15 mL 3   • lancets misc Please check blood sugar 2 times daily 100 each 1   • levothyroxine (Synthroid, Levoxyl) 125 mcg tablet Take 1 tablet (125 mcg) by mouth once daily. 30 tablet 11   • metoprolol succinate XL (Toprol-XL) 25 mg 24 hr tablet Take 1 tablet (25 mg) by mouth once daily. Do not crush or chew. 90 tablet 3   • mometasone (Elocon) 0.1 % lotion Use 3 drops topically to affected ear PRN itching 60 mL 1   • multivitamin-min-iron-FA-vit K 45 mg iron- 800 mcg-120 mcg capsule Take 1 capsule by mouth once daily.     • nystatin (Mycostatin) cream Apply 2x daily x 1 month; repeat if needed 30 g 2   • omega 3-dha-epa-fish oil (Fish OiL) 1,000 mg (120 mg-180 mg) capsule Take 120 mg by mouth once daily.     • pantoprazole (ProtoNix) 40 mg EC tablet Take 1 tablet (40 mg) by mouth once daily. Do not crush, chew, or split. 30 tablet 1   • pen needle, diabetic 31 gauge x 5/16\" needle Use as directed to inject insulin once daily 100 each 11   • predniSONE (Deltasone) 5 mg tablet Take 2.5 tablets (12.5 mg) by mouth once daily. 75 tablet 2   • sacubitriL-valsartan (Entresto) 24-26 mg tablet Take 1 tablet by mouth once daily. 90 tablet 3   • traZODone (Desyrel) 50 mg tablet Take 1 tablet (50 mg) by mouth as needed at bedtime.     • [DISCONTINUED] insulin glargine (Lantus Solostar U-100 Insulin) 100 unit/mL (3 mL) " pen Inject 10 Units under the skin once daily at bedtime. Take as directed per insulin instructions. 15 mL 1     No facility-administered encounter medications on file as of 5/13/2024.       Physical Exam  Constitutional:       Appearance: Normal appearance.   HENT:      Head: Normocephalic and atraumatic.   Eyes:      General: No scleral icterus.     Pupils: Pupils are equal, round, and reactive to light.   Cardiovascular:      Rate and Rhythm: Normal rate and regular rhythm.      Heart sounds: Murmur heard.   Pulmonary:      Effort: Pulmonary effort is normal.      Breath sounds: Normal breath sounds.   Musculoskeletal:         General: Normal range of motion.   Skin:     General: Skin is warm and dry.   Neurological:      General: No focal deficit present.      Mental Status: She is alert.   Psychiatric:         Mood and Affect: Mood normal.         Behavior: Behavior normal.         Encounter Date: 03/21/24   ECG 12 lead (Clinic Performed)    Narrative    Resting EKG independently interpreted by me at the time of reporting   reveals NSR, normal intervals, left anterior hemiblock, voltage criteria   for LVH, possible anterior infarct indeterminate age, nonspecific T wave   abnormality       Lab Results   Component Value Date    WBC 6.2 03/16/2024    HGB 14.0 03/16/2024    HCT 43.9 03/16/2024    MCV 93 03/16/2024     03/16/2024     Lab Results   Component Value Date    GLUCOSE 188 (H) 04/11/2024    CALCIUM 9.2 04/11/2024     04/11/2024    K 4.0 04/11/2024    CO2 27 04/11/2024     04/11/2024    BUN 22 04/11/2024    CREATININE 0.94 04/11/2024     LHC: 11.27.23:  No significant CAD.     ECHO: 11/2/23:  LVEF 40%.  Severe sclerotic AV with mean AV gradient of 15 mmHg, KAREEM 1.02 and DI of 0.33.      MICKEY: 3/19/24:  Severe sclerosis of AV, KAREEM 0.8 cm2,  mod to severe AI.  Ejection fraction is moderately depressed at 45%      Assessment and Plan:   Ms. Kristin Charles is an 78 y.o. female who presents  with aortic stenosis and aortic regurgitation.  This is associated with HTN, palpitations, PVD; and in the setting of anxiety, stage III CKD, GERD, hypothyroidism.  Their symptoms include Fatigue.  She has CCS class NYHA II heart failure symptoms.  Their imaging is consistent with aortic stenosis and aortic regurgitation.     We had a nice discussion regarding the indications for intervention on their valvular disease.  This included percutaneous as well as open surgical approaches. I do believe that a catheter based approach is in her best interest, given her age and co-morbidities.  She understands that the goal of this procedure will be to relieve symptoms if present, preserve left ventricular function, and prolong life.  I discussed the specific risks of vascular access bleeding, stroke and need for pacemaker placement.  In further evaluation we will obtain Computed tomography - TAVR protocol, we will use this to determine aortic valve calcification as well as distribution.     With regards to their surgical fitness, she is a Moderate  risk candidate.  This is mostly due to her advanced age and steroid therapy.  If there is compromise of vascular access or aortic root anatomy/geometry, I would offer them an open surgical approach.

## 2024-05-14 ENCOUNTER — HOSPITAL ENCOUNTER (OUTPATIENT)
Dept: RADIOLOGY | Facility: CLINIC | Age: 79
Discharge: HOME | End: 2024-05-14
Payer: MEDICARE

## 2024-05-14 DIAGNOSIS — I35.0 AORTIC VALVE STENOSIS, ETIOLOGY OF CARDIAC VALVE DISEASE UNSPECIFIED: ICD-10-CM

## 2024-05-14 PROCEDURE — 2550000001 HC RX 255 CONTRASTS: Performed by: INTERNAL MEDICINE

## 2024-05-14 PROCEDURE — 74174 CTA ABD&PLVS W/CONTRAST: CPT

## 2024-05-14 RX ADMIN — IOHEXOL 100 ML: 350 INJECTION, SOLUTION INTRAVENOUS at 14:43

## 2024-05-15 ENCOUNTER — CLINICAL SUPPORT (OUTPATIENT)
Dept: PRIMARY CARE | Facility: CLINIC | Age: 79
End: 2024-05-15
Payer: MEDICARE

## 2024-05-15 DIAGNOSIS — E11.9 DIABETES MELLITUS TYPE II, NON INSULIN DEPENDENT (MULTI): Primary | ICD-10-CM

## 2024-05-15 NOTE — PROGRESS NOTES
"[facility] Pharmacy Consult  Kristin Charles \"Edel\" is a 78 y.o. female was referred to Clinical Pharmacy Team for a Pharmacy consult.  The patient was referred for their Diabetes.    Referring Provider: Melanie Muñoz DO    Subjective   Allergies   Allergen Reactions    Morphine Unknown    Other Other    Sulfa (Sulfonamide Antibiotics) Unknown    Miconazole Rash    Neomycin-Bacitracin-Polymyxin Rash       [facility] - [place], [place] - [address] [facility]  [address] [facility] [address]  Phone: [phone] Fax: [phone]    [facility] - [place], [place] - [address]  [address]  [address]  [facility] [address]  Phone: [phone] Fax: [phone]    [facility]  [address], [address]  [facility] [address]  Phone: [phone] Fax: [phone]      HPI    Patient is here for a visit with the clinical ambulatory team in regards to glucose monitor education.    Review of Systems    Objective     There were no vitals taken for this visit.     LAB  Lab Results   Component Value Date    BILITOT 0.7 03/16/2024    CALCIUM 9.2 04/11/2024    CO2 27 04/11/2024     04/11/2024    CREATININE 0.94 04/11/2024    GLUCOSE 188 (H) 04/11/2024    ALKPHOS 57 03/16/2024    K 4.0 04/11/2024    PROT 6.1 (L) 03/16/2024     04/11/2024    AST 12 03/16/2024    ALT 15 03/16/2024    BUN 22 04/11/2024    ANIONGAP 15 04/11/2024    MG 2.33 11/01/2019    PHOS 3.3 10/07/2022    ALBUMIN 3.6 03/16/2024    GFRF CANCELED 05/27/2023    GFRMALE CANCELED 05/27/2023     Lab Results   Component Value Date    TRIG 136 11/15/2023    CHOL 171 11/15/2023    LDLCALC 90 11/15/2023    HDL 53.6 11/15/2023     Lab Results   Component Value Date    HGBA1C 11.6 (A) 05/07/2024       Current Outpatient Medications on File Prior to Visit   Medication Sig Dispense Refill    aspirin 81 mg EC tablet Take 1 tablet (81 mg) by mouth once daily.      biotin 10 mg tablet Take " "1 tablet (10 mg) by mouth once daily.      blood sugar diagnostic (Blood Glucose Test) strip Use 1 strip 2 times a day to test blood sugar. 100 strip 1    blood-glucose meter misc Please check blood sugars 2 times daily 1 each 0    celecoxib (CeleBREX) 200 mg capsule Take 1 capsule (200 mg) by mouth once daily. With a meal      empagliflozin (Jardiance) 25 mg Take 1 tablet (25 mg) by mouth once daily. 90 tablet 3    famotidine (Pepcid) 10 mg tablet Take 1 tablet (10 mg) by mouth once daily as needed for heartburn.      insulin degludec (Tresiba FlexTouch) 100 unit/mL (3 mL) injection Inject 10 Units under the skin once daily at bedtime. Take as directed per insulin instructions. 15 mL 3    insulin glargine (Lantus U-100 Insulin) 100 unit/mL injection Inject 10 Units under the skin once daily at bedtime. Take as directed per insulin instructions.      lancets misc Please check blood sugar 2 times daily 100 each 1    levothyroxine (Synthroid, Levoxyl) 125 mcg tablet Take 1 tablet (125 mcg) by mouth once daily. 30 tablet 11    metoprolol succinate XL (Toprol-XL) 25 mg 24 hr tablet Take 1 tablet (25 mg) by mouth once daily. Do not crush or chew. 90 tablet 3    mometasone (Elocon) 0.1 % lotion Use 3 drops topically to affected ear PRN itching (Patient not taking: Reported on 5/13/2024) 60 mL 1    nystatin (Mycostatin) cream Apply 2x daily x 1 month; repeat if needed (Patient not taking: Reported on 5/13/2024) 30 g 2    pantoprazole (ProtoNix) 40 mg EC tablet Take 1 tablet (40 mg) by mouth once daily. Do not crush, chew, or split. (Patient not taking: Reported on 5/13/2024) 30 tablet 1    pen needle, diabetic 31 gauge x 5/16\" needle Use as directed to inject insulin once daily 100 each 11    predniSONE (Deltasone) 5 mg tablet Take 2.5 tablets (12.5 mg) by mouth once daily. 75 tablet 2    sacubitriL-valsartan (Entresto) 24-26 mg tablet Take 1 tablet by mouth once daily. 90 tablet 3    traZODone (Desyrel) 50 mg tablet Take " 1 tablet (50 mg) by mouth as needed at bedtime.      [DISCONTINUED] calcium carbonate-vitamin D3 600 mg-5 mcg (200 unit) tablet Take 1 tablet by mouth 2 times a day.      [DISCONTINUED] multivitamin-min-iron-FA-vit K 45 mg iron- 800 mcg-120 mcg capsule Take 1 capsule by mouth once daily.      [DISCONTINUED] omega 3-dha-epa-fish oil (Fish OiL) 1,000 mg (120 mg-180 mg) capsule Take 120 mg by mouth once daily.       No current facility-administered medications on file prior to visit.        Assessment/Plan   Problem List Items Addressed This Visit       Diabetes mellitus type II, non insulin dependent (Multi) - Primary     Went over how to use Accu-check guide glucose meter, lancet, and test strips  Pt showed adequate techniques on how to use them  FUV in 1 month with pharamcy             Continue all meds under the continuation of care with the referring provider and clinical pharmacy team.    Luis Pelaez PharmD     Verbal consent to manage patient's drug therapy was obtained from [the patient and/or an individual authorized to act on behalf of a patient]. They were informed they may decline to participate or withdraw from participation in pharmacy services at any time.

## 2024-05-15 NOTE — ASSESSMENT & PLAN NOTE
Went over how to use Accu-check guide glucose meter, lancet, and test strips  Pt showed adequate techniques on how to use them  FUV in 1 month with analisa

## 2024-05-17 ENCOUNTER — HOSPITAL ENCOUNTER (OUTPATIENT)
Dept: RADIOLOGY | Facility: CLINIC | Age: 79
Discharge: HOME | End: 2024-05-17
Payer: MEDICARE

## 2024-05-17 DIAGNOSIS — Z96.652 HISTORY OF TOTAL KNEE ARTHROPLASTY, LEFT: ICD-10-CM

## 2024-05-17 PROCEDURE — 73562 X-RAY EXAM OF KNEE 3: CPT | Mod: LT

## 2024-05-17 PROCEDURE — 73562 X-RAY EXAM OF KNEE 3: CPT | Mod: LEFT SIDE | Performed by: STUDENT IN AN ORGANIZED HEALTH CARE EDUCATION/TRAINING PROGRAM

## 2024-05-20 ENCOUNTER — CARDIOLOGY CONFERENCE (OUTPATIENT)
Dept: CARDIOLOGY | Facility: HOSPITAL | Age: 79
End: 2024-05-20
Payer: MEDICARE

## 2024-05-21 ENCOUNTER — OFFICE VISIT (OUTPATIENT)
Dept: PRIMARY CARE | Facility: CLINIC | Age: 79
End: 2024-05-21
Payer: MEDICARE

## 2024-05-21 VITALS — TEMPERATURE: 98.1 F | DIASTOLIC BLOOD PRESSURE: 50 MMHG | SYSTOLIC BLOOD PRESSURE: 130 MMHG

## 2024-05-21 DIAGNOSIS — E03.9 HYPOTHYROIDISM, UNSPECIFIED TYPE: ICD-10-CM

## 2024-05-21 DIAGNOSIS — Z79.4 DIABETES MELLITUS, TYPE II, INSULIN DEPENDENT (MULTI): Primary | ICD-10-CM

## 2024-05-21 DIAGNOSIS — E11.9 DIABETES MELLITUS, TYPE II, INSULIN DEPENDENT (MULTI): Primary | ICD-10-CM

## 2024-05-21 DIAGNOSIS — M35.3 PMR (POLYMYALGIA RHEUMATICA) (MULTI): ICD-10-CM

## 2024-05-21 PROCEDURE — 1123F ACP DISCUSS/DSCN MKR DOCD: CPT | Performed by: FAMILY MEDICINE

## 2024-05-21 PROCEDURE — 1159F MED LIST DOCD IN RCRD: CPT | Performed by: FAMILY MEDICINE

## 2024-05-21 PROCEDURE — 3078F DIAST BP <80 MM HG: CPT | Performed by: FAMILY MEDICINE

## 2024-05-21 PROCEDURE — 3075F SYST BP GE 130 - 139MM HG: CPT | Performed by: FAMILY MEDICINE

## 2024-05-21 PROCEDURE — 1160F RVW MEDS BY RX/DR IN RCRD: CPT | Performed by: FAMILY MEDICINE

## 2024-05-21 PROCEDURE — 99214 OFFICE O/P EST MOD 30 MIN: CPT | Performed by: FAMILY MEDICINE

## 2024-05-21 RX ORDER — LEVOTHYROXINE SODIUM 125 UG/1
125 TABLET ORAL DAILY
Qty: 90 TABLET | Refills: 1 | Status: SHIPPED | OUTPATIENT
Start: 2024-05-21 | End: 2024-11-17

## 2024-05-21 RX ORDER — PREDNISONE 5 MG/1
5 TABLET ORAL DAILY
Qty: 30 TABLET | Refills: 0 | Status: SHIPPED | OUTPATIENT
Start: 2024-05-21 | End: 2024-06-06 | Stop reason: SDUPTHER

## 2024-05-21 RX ORDER — PREDNISONE 1 MG/1
4 TABLET ORAL DAILY
Qty: 90 TABLET | Refills: 0 | Status: SHIPPED | OUTPATIENT
Start: 2024-05-21 | End: 2024-06-06 | Stop reason: ALTCHOICE

## 2024-05-21 NOTE — PROGRESS NOTES
Chief complaint:   Chief Complaint   Patient presents with    Follow-up       HPI:  Kristin Charles is a 78 y.o. female who presents for evaluation of diabetes. She started Lantus and has met with the pharmacist, Luis Pelaez PharmD regarding use twice since our last visit.     Home blood sugars over the past few days are upper 90s fasting. She is feeling well and tolerating.     Her son has moved in with her currently. Hoping for this to be short term.    She has no pain currently from the PMR and is on 10 mg Prednisone with taper to 9 mg soon.    Physical exam:  /50 (BP Location: Right arm, Patient Position: Sitting)   Temp 36.7 °C (98.1 °F) (Oral)   General: NAD, well appearing female  Heart: RRR, no mumur appreciated  Lungs: CTAB, no wheezes, rales, rhonchi    Assessment/Plan   Problem List Items Addressed This Visit       Hypothyroidism    Relevant Medications    levothyroxine (Synthroid, Levoxyl) 125 mcg tablet    Diabetes mellitus, type II, insulin dependent (Multi) - Primary     Other Visit Diagnoses       PMR (polymyalgia rheumatica) (Multi)        Relevant Medications    predniSONE (Deltasone) 1 mg tablet    predniSONE (Deltasone) 5 mg tablet        Continue Lantus 10 units at bedtime and Jardiance 25 mg PO daily. Follow up 1 mo for re-evaluation, call if BS decrease as she will be decreasing her Prednisone dose every 2-3 weeks on average. She has rheumatology appointment in a couple weeks and will begin her taper from 10 mg to 9 mg PO daily soon for PMR. Refilled her thyroid medication.    Melanie Muñoz,

## 2024-05-24 ENCOUNTER — TELEPHONE (OUTPATIENT)
Dept: PRIMARY CARE | Facility: CLINIC | Age: 79
End: 2024-05-24
Payer: MEDICARE

## 2024-05-24 NOTE — TELEPHONE ENCOUNTER
Pt is calling she states that she started prednisone on 5/21/24 and you mentioned that she could get a yeast infection from the medication. Pt states that she woke up today and has a yeast infection. Can you please send in diflucan to her pharm [facility] #[phone].

## 2024-05-29 ENCOUNTER — TELEMEDICINE (OUTPATIENT)
Dept: PHARMACY | Facility: HOSPITAL | Age: 79
End: 2024-05-29
Payer: MEDICARE

## 2024-05-29 DIAGNOSIS — E11.9 DIABETES MELLITUS, TYPE II, INSULIN DEPENDENT (MULTI): ICD-10-CM

## 2024-05-29 DIAGNOSIS — E11.9 DIABETES MELLITUS TYPE II, NON INSULIN DEPENDENT (MULTI): Primary | ICD-10-CM

## 2024-05-29 DIAGNOSIS — I50.22 HEART FAILURE WITH MID-RANGE EJECTION FRACTION (MULTI): ICD-10-CM

## 2024-05-29 DIAGNOSIS — Z79.4 DIABETES MELLITUS, TYPE II, INSULIN DEPENDENT (MULTI): ICD-10-CM

## 2024-05-29 ASSESSMENT — ENCOUNTER SYMPTOMS: DIABETIC ASSOCIATED SYMPTOMS: 0

## 2024-05-29 NOTE — PROGRESS NOTES
"[facility] Pharmacy Consult  Kristin Charles \"Edel\" is a 78 y.o. female was referred to Clinical Pharmacy Team for a Pharmacy consult.  The patient was referred for their Diabetes.    Referring Provider: Melanie Muñoz DO    Subjective   Allergies   Allergen Reactions    Other Other    Sulfa (Sulfonamide Antibiotics) Unknown    Miconazole Rash    Neomycin-Bacitracin-Polymyxin Rash       [facility] - [place], [place] - [address] [facility]  [address] [facility] [address]  Phone: [phone] Fax: [phone]    [facility] - [place], [place] - [address]  [address]  [address]  [facility] [address]  Phone: [phone] Fax: [phone]    [facility]  [address], [address]  [facility] [address]  Phone: [phone] Fax: [phone]      Diabetes  She presents for her follow-up diabetic visit. She has type 2 diabetes mellitus. Her disease course has been stable. There are no hypoglycemic associated symptoms. There are no diabetic associated symptoms. There are no hypoglycemic complications. Symptoms are stable. There are no diabetic complications. Risk factors for coronary artery disease include diabetes mellitus. She is compliant with treatment all of the time. Her home blood glucose trend is decreasing steadily. Her breakfast blood glucose is taken between 7-8 am. Her breakfast blood glucose range is generally 70-90 mg/dl.     Prednisone dose drop from 10mg to 9mg on 5/24. Had hypoglycemia symptoms today, was at 78 mg/dL.     Reported the following glucose trends:  5/24: 90 mg/dL  5/25: 83 mg/dL  5/26: 95 mg/dL  5/27: 87 mg/dL  5/28: 84 mg/dL  5/29: 78 mg/dL    Review of Systems    Objective     There were no vitals taken for this visit.     LAB  Lab Results   Component Value Date    BILITOT 0.7 03/16/2024    CALCIUM 9.2 04/11/2024    CO2 27 04/11/2024     04/11/2024    CREATININE 0.94 04/11/2024    GLUCOSE 188 (H) " "04/11/2024    ALKPHOS 57 03/16/2024    K 4.0 04/11/2024    PROT 6.1 (L) 03/16/2024     04/11/2024    AST 12 03/16/2024    ALT 15 03/16/2024    BUN 22 04/11/2024    ANIONGAP 15 04/11/2024    MG 2.33 11/01/2019    PHOS 3.3 10/07/2022    ALBUMIN 3.6 03/16/2024    GFRF CANCELED 05/27/2023    GFRMALE CANCELED 05/27/2023     Lab Results   Component Value Date    TRIG 136 11/15/2023    CHOL 171 11/15/2023    LDLCALC 90 11/15/2023    HDL 53.6 11/15/2023     Lab Results   Component Value Date    HGBA1C 11.6 (A) 05/07/2024       Current Outpatient Medications on File Prior to Visit   Medication Sig Dispense Refill    aspirin 81 mg EC tablet Take 1 tablet (81 mg) by mouth once daily.      biotin 10 mg tablet Take 1 tablet (10 mg) by mouth once daily.      blood sugar diagnostic (Blood Glucose Test) strip Use 1 strip 2 times a day to test blood sugar. 100 strip 1    blood-glucose meter misc Please check blood sugars 2 times daily 1 each 0    celecoxib (CeleBREX) 200 mg capsule Take 1 capsule (200 mg) by mouth once daily. With a meal      empagliflozin (Jardiance) 25 mg Take 1 tablet (25 mg) by mouth once daily. 90 tablet 3    famotidine (Pepcid) 10 mg tablet Take 1 tablet (10 mg) by mouth once daily as needed for heartburn.      insulin degludec (Tresiba FlexTouch) 100 unit/mL (3 mL) injection Inject 10 Units under the skin once daily at bedtime. Take as directed per insulin instructions. 15 mL 3    insulin glargine (Lantus U-100 Insulin) 100 unit/mL injection Inject 10 Units under the skin once daily at bedtime. Take as directed per insulin instructions.      lancets misc Please check blood sugar 2 times daily 100 each 1    levothyroxine (Synthroid, Levoxyl) 125 mcg tablet Take 1 tablet (125 mcg) by mouth once daily. 90 tablet 1    metoprolol succinate XL (Toprol-XL) 25 mg 24 hr tablet Take 1 tablet (25 mg) by mouth once daily. Do not crush or chew. 90 tablet 3    pen needle, diabetic 31 gauge x 5/16\" needle Use as " directed to inject insulin once daily 100 each 11    predniSONE (Deltasone) 1 mg tablet Take 4 tablets (4 mg) by mouth once daily for 23 days. 90 tablet 0    predniSONE (Deltasone) 5 mg tablet Take 1 tablet (5 mg) by mouth once daily. 30 tablet 0    sacubitriL-valsartan (Entresto) 24-26 mg tablet Take 1 tablet by mouth once daily. 90 tablet 3    traZODone (Desyrel) 50 mg tablet Take 1 tablet (50 mg) by mouth as needed at bedtime.       No current facility-administered medications on file prior to visit.        Assessment/Plan   Problem List Items Addressed This Visit       Heart failure with mid-range ejection fraction (Multi)    Diabetes mellitus, type II, insulin dependent (Multi)     CONTINUE all medication as prescribed  Pt's blood glucose doing well, will not make adjustment for now. Instructed pt to reduce by 1 units if she goes under 70 mg/dL as her prednisone taper comes down  FUV in 4 weeks          Other Visit Diagnoses       Diabetes mellitus type II, non insulin dependent (Multi)    -  Primary             Continue all meds under the continuation of care with the referring provider and clinical pharmacy team.    Luis Pelaez PharmD     Verbal consent to manage patient's drug therapy was obtained from [the patient and/or an individual authorized to act on behalf of a patient]. They were informed they may decline to participate or withdraw from participation in pharmacy services at any time.

## 2024-05-29 NOTE — ASSESSMENT & PLAN NOTE
CONTINUE all medication as prescribed  Pt's blood glucose doing well, will not make adjustment for now. Instructed pt to reduce by 1 units if she goes under 70 mg/dL as her prednisone taper comes down  FUV in 4 weeks

## 2024-05-31 ENCOUNTER — LAB (OUTPATIENT)
Dept: LAB | Facility: LAB | Age: 79
End: 2024-05-31
Payer: MEDICARE

## 2024-05-31 DIAGNOSIS — M35.3 PMR (POLYMYALGIA RHEUMATICA) (MULTI): ICD-10-CM

## 2024-05-31 DIAGNOSIS — Z79.52 CURRENT CHRONIC USE OF SYSTEMIC STEROIDS: ICD-10-CM

## 2024-05-31 LAB
ALBUMIN SERPL BCP-MCNC: 3.7 G/DL (ref 3.4–5)
ALP SERPL-CCNC: 37 U/L (ref 33–136)
ALT SERPL W P-5'-P-CCNC: 12 U/L (ref 7–45)
ANION GAP SERPL CALC-SCNC: 13 MMOL/L (ref 10–20)
AST SERPL W P-5'-P-CCNC: 12 U/L (ref 9–39)
BASOPHILS # BLD AUTO: 0.06 X10*3/UL (ref 0–0.1)
BASOPHILS NFR BLD AUTO: 0.8 %
BILIRUB SERPL-MCNC: 0.6 MG/DL (ref 0–1.2)
BUN SERPL-MCNC: 20 MG/DL (ref 6–23)
CALCIUM SERPL-MCNC: 8.9 MG/DL (ref 8.6–10.6)
CHLORIDE SERPL-SCNC: 106 MMOL/L (ref 98–107)
CO2 SERPL-SCNC: 28 MMOL/L (ref 21–32)
CREAT SERPL-MCNC: 0.91 MG/DL (ref 0.5–1.05)
CRP SERPL-MCNC: 0.16 MG/DL
EGFRCR SERPLBLD CKD-EPI 2021: 64 ML/MIN/1.73M*2
EOSINOPHIL # BLD AUTO: 0.28 X10*3/UL (ref 0–0.4)
EOSINOPHIL NFR BLD AUTO: 3.9 %
ERYTHROCYTE [DISTWIDTH] IN BLOOD BY AUTOMATED COUNT: 14.7 % (ref 11.5–14.5)
ERYTHROCYTE [SEDIMENTATION RATE] IN BLOOD BY WESTERGREN METHOD: 6 MM/H (ref 0–30)
GLUCOSE SERPL-MCNC: 82 MG/DL (ref 74–99)
HCT VFR BLD AUTO: 44.1 % (ref 36–46)
HGB BLD-MCNC: 14 G/DL (ref 12–16)
IMM GRANULOCYTES # BLD AUTO: 0.02 X10*3/UL (ref 0–0.5)
IMM GRANULOCYTES NFR BLD AUTO: 0.3 % (ref 0–0.9)
LYMPHOCYTES # BLD AUTO: 2.73 X10*3/UL (ref 0.8–3)
LYMPHOCYTES NFR BLD AUTO: 38.1 %
MCH RBC QN AUTO: 31.5 PG (ref 26–34)
MCHC RBC AUTO-ENTMCNC: 31.7 G/DL (ref 32–36)
MCV RBC AUTO: 99 FL (ref 80–100)
MONOCYTES # BLD AUTO: 0.82 X10*3/UL (ref 0.05–0.8)
MONOCYTES NFR BLD AUTO: 11.5 %
NEUTROPHILS # BLD AUTO: 3.25 X10*3/UL (ref 1.6–5.5)
NEUTROPHILS NFR BLD AUTO: 45.4 %
NRBC BLD-RTO: 0 /100 WBCS (ref 0–0)
PLATELET # BLD AUTO: 215 X10*3/UL (ref 150–450)
POTASSIUM SERPL-SCNC: 4 MMOL/L (ref 3.5–5.3)
PROT SERPL-MCNC: 5.8 G/DL (ref 6.4–8.2)
RBC # BLD AUTO: 4.45 X10*6/UL (ref 4–5.2)
SODIUM SERPL-SCNC: 143 MMOL/L (ref 136–145)
WBC # BLD AUTO: 7.2 X10*3/UL (ref 4.4–11.3)

## 2024-05-31 PROCEDURE — 86140 C-REACTIVE PROTEIN: CPT

## 2024-05-31 PROCEDURE — 85652 RBC SED RATE AUTOMATED: CPT

## 2024-05-31 PROCEDURE — 36415 COLL VENOUS BLD VENIPUNCTURE: CPT

## 2024-05-31 PROCEDURE — 80053 COMPREHEN METABOLIC PANEL: CPT

## 2024-05-31 PROCEDURE — 85025 COMPLETE CBC W/AUTO DIFF WBC: CPT

## 2024-06-04 NOTE — PROGRESS NOTES
"Rheumatology New Outpatient  Note    Subjective   Kristin Charles \"Crescencio" is a 78 y.o. female presenting today for Joint Pain.    History of Presenting Problem:     Rheum history: started having shoulder/hip pain and stiffness around 1/2024 and her CRP was elevated. She was initially given a medrol adam. She continued to have symptoms and in 3/15/2024 and she was started on 15 mg daily. After 10 days she self reduced to 10 mg for 2 weeks then she was instructed to go back to 15 mg. She is overall feeling better but continues to have more pain and stiffness in the morning. No headache, jaw pain or scalp tenderness.    She is feeling well on 8 mg . No shoulder pain or stiffness.     Past Medical History:   Past Medical History:   Diagnosis Date    Actinic keratosis     Acute candidiasis of vulva and vagina     Yeast infection of the vagina    Acute laryngitis 03/20/2018    Acute laryngitis    Lee's cyst of knee 04/06/2023    Bitten or stung by nonvenomous insect and other nonvenomous arthropods, initial encounter 09/02/2015    Bug bite with infection    Candidiasis, unspecified 10/13/2017    Yeast infection    Chronic ethmoidal sinusitis 11/24/2020    Chronic ethmoidal sinusitis    Chronic maxillary sinusitis 08/06/2020    Chronic maxillary sinusitis    Disease of thyroid gland     Eczema     Heart murmur     Heart valve disease     Laceration without foreign body of unspecified forearm, initial encounter 01/09/2022    Forearm laceration    Lumbago with sciatica, left side 03/29/2019    Chronic bilateral low back pain with left-sided sciatica    Lymphocytic colitis 05/05/2023    Colonoscopy: 4/24/2023    Nail disorder, unspecified 08/08/2023    Neoplasm of uncertain behavior of skin 08/08/2023    Obesity 01/19/2015    Onychomycosis 04/06/2023    Open wound of hip and thigh with tendon involvement 05/24/2018    Overweight (BMI 25.0-29.9) 04/06/2023    Personal history of diseases of the skin and subcutaneous tissue " 01/09/2022    History of contact dermatitis    Personal history of diseases of the skin and subcutaneous tissue 09/02/2015    History of cellulitis    Personal history of other diseases of the musculoskeletal system and connective tissue     Personal history of arthritis    Personal history of other diseases of the nervous system and sense organs     History of cataract    Personal history of other diseases of the nervous system and sense organs 09/20/2019    History of sciatica    Personal history of other endocrine, nutritional and metabolic disease     History of hypothyroidism    Personal history of other infectious and parasitic diseases 09/23/2017    History of erysipelas    Personal history of other specified conditions 03/06/2020    History of epistaxis    Rash and other nonspecific skin eruption 06/15/2015    Skin rash    Rhinitis 10 years    Sciatica 12/11/2014    Status post joint replacement 06/19/2023    Strain of muscle, fascia and tendon of the posterior muscle group at thigh level, unspecified thigh, initial encounter 05/01/2018    Hamstring strain    Tinnitus 10 years    Vaginal discharge 04/06/2023    Varus deformity, not elsewhere classified, unspecified knee 03/06/2019    Acquired genu varum       Allergies:   Allergies   Allergen Reactions    Other Other    Sulfa (Sulfonamide Antibiotics) Unknown    Miconazole Rash    Neomycin-Bacitracin-Polymyxin Rash       Medications:   Current Outpatient Medications:     aspirin 81 mg EC tablet, Take 1 tablet (81 mg) by mouth once daily., Disp: , Rfl:     biotin 10 mg tablet, Take 1 tablet (10 mg) by mouth once daily., Disp: , Rfl:     blood sugar diagnostic (Blood Glucose Test) strip, Use 1 strip 2 times a day to test blood sugar., Disp: 100 strip, Rfl: 1    blood-glucose meter misc, Please check blood sugars 2 times daily, Disp: 1 each, Rfl: 0    celecoxib (CeleBREX) 200 mg capsule, Take 1 capsule (200 mg) by mouth once daily. With a meal, Disp: , Rfl:      "empagliflozin (Jardiance) 25 mg, Take 1 tablet (25 mg) by mouth once daily., Disp: 90 tablet, Rfl: 3    famotidine (Pepcid) 10 mg tablet, Take 1 tablet (10 mg) by mouth once daily as needed for heartburn., Disp: , Rfl:     insulin degludec (Tresiba FlexTouch) 100 unit/mL (3 mL) injection, Inject 10 Units under the skin once daily at bedtime. Take as directed per insulin instructions., Disp: 15 mL, Rfl: 3    insulin glargine (Lantus U-100 Insulin) 100 unit/mL injection, Inject 10 Units under the skin once daily at bedtime. Take as directed per insulin instructions., Disp: , Rfl:     lancets misc, Please check blood sugar 2 times daily, Disp: 100 each, Rfl: 1    levothyroxine (Synthroid, Levoxyl) 125 mcg tablet, Take 1 tablet (125 mcg) by mouth once daily., Disp: 90 tablet, Rfl: 1    metoprolol succinate XL (Toprol-XL) 25 mg 24 hr tablet, Take 1 tablet (25 mg) by mouth once daily. Do not crush or chew., Disp: 90 tablet, Rfl: 3    pen needle, diabetic 31 gauge x 5/16\" needle, Use as directed to inject insulin once daily, Disp: 100 each, Rfl: 11    predniSONE (Deltasone) 1 mg tablet, Take 4 tablets (4 mg) by mouth once daily for 23 days., Disp: 90 tablet, Rfl: 0    predniSONE (Deltasone) 5 mg tablet, Take 1 tablet (5 mg) by mouth once daily., Disp: 30 tablet, Rfl: 0    sacubitriL-valsartan (Entresto) 24-26 mg tablet, Take 1 tablet by mouth once daily., Disp: 90 tablet, Rfl: 3    traZODone (Desyrel) 50 mg tablet, Take 1 tablet (50 mg) by mouth as needed at bedtime., Disp: , Rfl:     Review of Systems:   Constitutional: Denies fever, chills   Eyes: Denies dry eyes, pain in the eyes   ENT: Denies dry mouth, loss of taste, sores in the mouth  Cardiovascular: Denies chest pain, palpitations   Respiratory: Denies shortness of breath   Gastrointestinal: Denies heartburn   Integumentary: Denies photosensitivity, rash or lesions, Raynaud's   Neurological: Denies any numbness or tingling    MSK: As per HPI.     All 10 review of " "systems have been reviewed and are negative for complaint except as noted in the HPI    Objective   Physical Examination:  There were no vitals filed for this visit.      Growth %ile SmartLinks can only be used for patients less than 20 years old.  Ht Readings from Last 1 Encounters:   05/13/24 1.575 m (5' 2\")     Wt Readings from Last 1 Encounters:   05/13/24 68 kg (150 lb)       General - NAD, sitting up in chair, well-groomed, pleasant, AAOx3  Head: Normocephalic, atraumatic  Eyes - PERRLA, EOMI. No conjunctiva injection.   Cardiovascular - No murmurs or rubs.  Lungs - Symmetric chest expansion.   Skin - No rashes or ulcers. Skin warm and dry. No erythema on bilateral cheeks.  Extremities - No edema, cyanosis ,or clubbing  Neurological - Alert and oriented x 3,  grossly intact. No focal deficit.          Laboratory Testing:  3/2024: CMP normal, CRP 1.08, ESR 10, CBC normal, RF-, CCP-  2/2024: CRP 1.25, ESR 26      Assessment/Plan   Kristin Charles \"Crescencio" is a 78 y.o. female with PMHx of PVD, aortic stenosis, HFrEF, HTN, allergic rhinitis, DM, Hypothyroidism, GERD CKD, Anxiety, OA, and lumbar radiculopathy who is presenting today as a Follow up for PMR    ##Polymyalgia rheumatica (PMR):   -Diagnosed by PCP, in ~2/2024. Shoulder/hip pain/stiffness and elevated CRP  and had prompt response to  steroids.   -Current dose of prednisone: 8 mg daily  -Symptoms: Improved  -Recent labs:4/2024: ESR/CRP normal  -Steroid plan: continue to taper down by 1 mg every 2-4 weeks as tolerated  -Most recent ESR/CRP 5/2024 normal  -GCA symptoms: no headache, jaw pain or claudications. No new vision changes. Patient is aware and advised to go to ED if he experiences any GCA symptoms.  -Take 2000 units vitamin D and calcium supplements    The assessment and plan, risk and benefits were discussed with the patient. All of the patients questions were answered and patient agrees to the plan.      Mellissa Webster MD  Clinical Assistant "   Department of Rheumatology   [facility]

## 2024-06-06 ENCOUNTER — OFFICE VISIT (OUTPATIENT)
Dept: RHEUMATOLOGY | Facility: CLINIC | Age: 79
End: 2024-06-06
Payer: MEDICARE

## 2024-06-06 VITALS
BODY MASS INDEX: 28.49 KG/M2 | WEIGHT: 154.8 LBS | DIASTOLIC BLOOD PRESSURE: 68 MMHG | SYSTOLIC BLOOD PRESSURE: 114 MMHG | TEMPERATURE: 97.2 F | HEART RATE: 52 BPM | HEIGHT: 62 IN

## 2024-06-06 DIAGNOSIS — I35.1 NONRHEUMATIC AORTIC VALVE INSUFFICIENCY: Primary | ICD-10-CM

## 2024-06-06 DIAGNOSIS — I35.0 AORTIC VALVE STENOSIS, ETIOLOGY OF CARDIAC VALVE DISEASE UNSPECIFIED: Primary | ICD-10-CM

## 2024-06-06 DIAGNOSIS — M35.3 PMR (POLYMYALGIA RHEUMATICA) (MULTI): Primary | ICD-10-CM

## 2024-06-06 DIAGNOSIS — Z79.52 CURRENT CHRONIC USE OF SYSTEMIC STEROIDS: ICD-10-CM

## 2024-06-06 PROCEDURE — 1123F ACP DISCUSS/DSCN MKR DOCD: CPT | Performed by: STUDENT IN AN ORGANIZED HEALTH CARE EDUCATION/TRAINING PROGRAM

## 2024-06-06 PROCEDURE — 1036F TOBACCO NON-USER: CPT | Performed by: STUDENT IN AN ORGANIZED HEALTH CARE EDUCATION/TRAINING PROGRAM

## 2024-06-06 PROCEDURE — 1160F RVW MEDS BY RX/DR IN RCRD: CPT | Performed by: STUDENT IN AN ORGANIZED HEALTH CARE EDUCATION/TRAINING PROGRAM

## 2024-06-06 PROCEDURE — 3074F SYST BP LT 130 MM HG: CPT | Performed by: STUDENT IN AN ORGANIZED HEALTH CARE EDUCATION/TRAINING PROGRAM

## 2024-06-06 PROCEDURE — 99214 OFFICE O/P EST MOD 30 MIN: CPT | Performed by: STUDENT IN AN ORGANIZED HEALTH CARE EDUCATION/TRAINING PROGRAM

## 2024-06-06 PROCEDURE — 3078F DIAST BP <80 MM HG: CPT | Performed by: STUDENT IN AN ORGANIZED HEALTH CARE EDUCATION/TRAINING PROGRAM

## 2024-06-06 PROCEDURE — 1159F MED LIST DOCD IN RCRD: CPT | Performed by: STUDENT IN AN ORGANIZED HEALTH CARE EDUCATION/TRAINING PROGRAM

## 2024-06-06 RX ORDER — PREDNISONE 1 MG/1
2 TABLET ORAL DAILY
Qty: 180 TABLET | Refills: 0 | Status: SHIPPED | OUTPATIENT
Start: 2024-06-06

## 2024-06-06 RX ORDER — PREDNISONE 5 MG/1
5 TABLET ORAL DAILY
Qty: 90 TABLET | Refills: 0 | Status: SHIPPED | OUTPATIENT
Start: 2024-06-06

## 2024-06-10 ENCOUNTER — LAB (OUTPATIENT)
Dept: LAB | Facility: LAB | Age: 79
End: 2024-06-10
Payer: MEDICARE

## 2024-06-10 DIAGNOSIS — I35.1 NONRHEUMATIC AORTIC VALVE INSUFFICIENCY: ICD-10-CM

## 2024-06-10 LAB
ANION GAP SERPL CALC-SCNC: 13 MMOL/L (ref 10–20)
BUN SERPL-MCNC: 16 MG/DL (ref 6–23)
CALCIUM SERPL-MCNC: 9.2 MG/DL (ref 8.6–10.6)
CHLORIDE SERPL-SCNC: 105 MMOL/L (ref 98–107)
CO2 SERPL-SCNC: 29 MMOL/L (ref 21–32)
CREAT SERPL-MCNC: 0.76 MG/DL (ref 0.5–1.05)
EGFRCR SERPLBLD CKD-EPI 2021: 79 ML/MIN/1.73M*2
ERYTHROCYTE [DISTWIDTH] IN BLOOD BY AUTOMATED COUNT: 14 % (ref 11.5–14.5)
GLUCOSE SERPL-MCNC: 77 MG/DL (ref 74–99)
HCT VFR BLD AUTO: 45.6 % (ref 36–46)
HGB BLD-MCNC: 14.3 G/DL (ref 12–16)
INR PPP: 0.9 (ref 0.9–1.1)
MCH RBC QN AUTO: 31.4 PG (ref 26–34)
MCHC RBC AUTO-ENTMCNC: 31.4 G/DL (ref 32–36)
MCV RBC AUTO: 100 FL (ref 80–100)
NRBC BLD-RTO: 0 /100 WBCS (ref 0–0)
PLATELET # BLD AUTO: 196 X10*3/UL (ref 150–450)
POTASSIUM SERPL-SCNC: 4.1 MMOL/L (ref 3.5–5.3)
PROTHROMBIN TIME: 10.6 SECONDS (ref 9.8–12.8)
RBC # BLD AUTO: 4.55 X10*6/UL (ref 4–5.2)
SODIUM SERPL-SCNC: 143 MMOL/L (ref 136–145)
WBC # BLD AUTO: 6.8 X10*3/UL (ref 4.4–11.3)

## 2024-06-10 PROCEDURE — 36415 COLL VENOUS BLD VENIPUNCTURE: CPT

## 2024-06-10 PROCEDURE — 85610 PROTHROMBIN TIME: CPT

## 2024-06-10 PROCEDURE — 80048 BASIC METABOLIC PNL TOTAL CA: CPT

## 2024-06-10 PROCEDURE — 85027 COMPLETE CBC AUTOMATED: CPT

## 2024-06-11 ENCOUNTER — OFFICE VISIT (OUTPATIENT)
Dept: PRIMARY CARE | Facility: CLINIC | Age: 79
End: 2024-06-11
Payer: MEDICARE

## 2024-06-11 VITALS
WEIGHT: 155 LBS | DIASTOLIC BLOOD PRESSURE: 60 MMHG | SYSTOLIC BLOOD PRESSURE: 116 MMHG | BODY MASS INDEX: 28.35 KG/M2 | TEMPERATURE: 97.4 F

## 2024-06-11 DIAGNOSIS — B37.9 YEAST INFECTION: ICD-10-CM

## 2024-06-11 DIAGNOSIS — N89.8 VAGINAL DISCHARGE: Primary | ICD-10-CM

## 2024-06-11 DIAGNOSIS — E11.9 DIABETES MELLITUS TYPE II, NON INSULIN DEPENDENT (MULTI): ICD-10-CM

## 2024-06-11 PROCEDURE — 87205 SMEAR GRAM STAIN: CPT

## 2024-06-11 PROCEDURE — 1159F MED LIST DOCD IN RCRD: CPT | Performed by: FAMILY MEDICINE

## 2024-06-11 PROCEDURE — 3078F DIAST BP <80 MM HG: CPT | Performed by: FAMILY MEDICINE

## 2024-06-11 PROCEDURE — 1123F ACP DISCUSS/DSCN MKR DOCD: CPT | Performed by: FAMILY MEDICINE

## 2024-06-11 PROCEDURE — 99214 OFFICE O/P EST MOD 30 MIN: CPT | Performed by: FAMILY MEDICINE

## 2024-06-11 PROCEDURE — 3074F SYST BP LT 130 MM HG: CPT | Performed by: FAMILY MEDICINE

## 2024-06-11 RX ORDER — INSULIN DEGLUDEC 100 U/ML
8 INJECTION, SOLUTION SUBCUTANEOUS NIGHTLY
Qty: 15 ML | Refills: 3 | Status: SHIPPED | OUTPATIENT
Start: 2024-06-11

## 2024-06-11 RX ORDER — FLUCONAZOLE 150 MG/1
150 TABLET ORAL ONCE
Qty: 1 TABLET | Refills: 0 | Status: SHIPPED | OUTPATIENT
Start: 2024-06-11 | End: 2024-06-11

## 2024-06-11 NOTE — PROGRESS NOTES
Subjective   Patient ID: Edel Charles is a 78 y.o. female who presents for Vaginitis/Bacterial Vaginosis.    Female  Problem  The patient's primary symptoms include genital itching and vaginal discharge. The patient's pertinent negatives include no genital lesions, genital odor, genital rash, missed menses, pelvic pain or vaginal bleeding. The current episode started 1 to 4 weeks ago. The problem occurs daily. The problem has been waxing and waning.        Vaginal discharge & itching x2 weeks; has been using monistat which helps for about 24 hours, then symptoms return    She has her TAVR scheduled for Monday next week.     She has been recording her blood sugars in the am and they have been slowly decreasing and now are in the 70-80s in the am. Sometimes she wakes shaky. She is down to 8 mg of Prednisone.     Review of Systems   Genitourinary:  Positive for vaginal discharge. Negative for missed menses and pelvic pain.     Admits to vaginal itching and discharge    Objective   /60 (BP Location: Right arm, Patient Position: Sitting)   Temp 36.3 °C (97.4 °F) (Oral)   Wt 70.3 kg (155 lb)   BMI 28.35 kg/m²     Physical Exam  Constitutional:       Appearance: Normal appearance.   Cardiovascular:      Rate and Rhythm: Normal rate and regular rhythm.      Pulses: Normal pulses.   Pulmonary:      Effort: Pulmonary effort is normal.      Breath sounds: Normal breath sounds.   Abdominal:      General: Abdomen is flat.   Genitourinary:     Comments: Erythematous vulva and vagina with white discharge   Neurological:      Mental Status: She is alert.         Assessment/Plan   Problem List Items Addressed This Visit    None  Visit Diagnoses         Codes    Vaginal discharge    -  Primary N89.8    Relevant Orders    Vaginitis Gram Stain For Bacterial Vaginosis + Yeast    Yeast infection     B37.9    Relevant Medications    fluconazole (Diflucan) 150 mg tablet    Diabetes mellitus type II, non insulin dependent (Multi)      E11.9    Relevant Medications    insulin degludec (Tresiba FlexTouch) 100 unit/mL (3 mL) injection          She is now currently on Prednisone 8 mg PO daily with plan to continue taper down every 2-4 weeks  Decrease from 10 units 8 units of basal insulin with continued monitoring as we may need to continue to decrease as her Prednisone decreases.  Continue Jardiance 25 mg PO daily, did discuss increased risk of UTI/yeast infection but at this time, will continue this dosing for now  Diflucan 150 mg PO once for presumed yeast infection, swab sent for confirmation  Follow up 1 week if not resolved

## 2024-06-12 PROCEDURE — RXMED WILLOW AMBULATORY MEDICATION CHARGE

## 2024-06-13 LAB
BACTERIAL VAGINOSIS VAG-IMP: ABNORMAL
CLUE CELLS VAG LPF-#/AREA: ABNORMAL /[LPF]
NUGENT SCORE: 4
YEAST VAG WET PREP-#/AREA: PRESENT

## 2024-06-17 ENCOUNTER — HOSPITAL ENCOUNTER (INPATIENT)
Facility: HOSPITAL | Age: 79
LOS: 1 days | Discharge: HOME | DRG: 267 | End: 2024-06-18
Attending: INTERNAL MEDICINE | Admitting: INTERNAL MEDICINE
Payer: MEDICARE

## 2024-06-17 ENCOUNTER — APPOINTMENT (OUTPATIENT)
Dept: CARDIOLOGY | Facility: HOSPITAL | Age: 79
DRG: 267 | End: 2024-06-17
Payer: MEDICARE

## 2024-06-17 DIAGNOSIS — Z95.2 S/P TAVR (TRANSCATHETER AORTIC VALVE REPLACEMENT): ICD-10-CM

## 2024-06-17 DIAGNOSIS — I35.2 NONRHEUMATIC AORTIC (VALVE) STENOSIS WITH INSUFFICIENCY: ICD-10-CM

## 2024-06-17 DIAGNOSIS — I35.0 AORTIC VALVE STENOSIS, ETIOLOGY OF CARDIAC VALVE DISEASE UNSPECIFIED: Primary | ICD-10-CM

## 2024-06-17 LAB
ABO GROUP (TYPE) IN BLOOD: NORMAL
ACT BLD: 306 SEC (ref 83–199)
ANION GAP SERPL CALC-SCNC: 13 MMOL/L (ref 10–20)
ANTIBODY SCREEN: NORMAL
AORTIC VALVE MEAN GRADIENT: 19 MMHG
AORTIC VALVE PEAK VELOCITY: 3.19 M/S
ATRIAL RATE: 71 BPM
AV PEAK GRADIENT: 40.7 MMHG
AVA (PEAK VEL): 0.74 CM2
AVA (VTI): 0.71 CM2
BASOPHILS # BLD AUTO: 0.03 X10*3/UL (ref 0–0.1)
BASOPHILS NFR BLD AUTO: 0.4 %
BUN SERPL-MCNC: 16 MG/DL (ref 6–23)
CALCIUM SERPL-MCNC: 8.6 MG/DL (ref 8.6–10.6)
CHLORIDE SERPL-SCNC: 105 MMOL/L (ref 98–107)
CO2 SERPL-SCNC: 25 MMOL/L (ref 21–32)
CREAT SERPL-MCNC: 0.78 MG/DL (ref 0.5–1.05)
EGFRCR SERPLBLD CKD-EPI 2021: 77 ML/MIN/1.73M*2
EJECTION FRACTION APICAL 4 CHAMBER: 50.6
EOSINOPHIL # BLD AUTO: 0.11 X10*3/UL (ref 0–0.4)
EOSINOPHIL NFR BLD AUTO: 1.3 %
ERYTHROCYTE [DISTWIDTH] IN BLOOD BY AUTOMATED COUNT: 13.5 % (ref 11.5–14.5)
GLUCOSE BLD MANUAL STRIP-MCNC: 137 MG/DL (ref 74–99)
GLUCOSE BLD MANUAL STRIP-MCNC: 160 MG/DL (ref 74–99)
GLUCOSE BLD MANUAL STRIP-MCNC: 213 MG/DL (ref 74–99)
GLUCOSE SERPL-MCNC: 148 MG/DL (ref 74–99)
HCT VFR BLD AUTO: 38.8 % (ref 36–46)
HGB BLD-MCNC: 12.4 G/DL (ref 12–16)
IMM GRANULOCYTES # BLD AUTO: 0.09 X10*3/UL (ref 0–0.5)
IMM GRANULOCYTES NFR BLD AUTO: 1.1 % (ref 0–0.9)
LEFT ATRIUM VOLUME AREA LENGTH INDEX BSA: 34.8 ML/M2
LEFT VENTRICULAR OUTFLOW TRACT DIAMETER: 1.9 CM
LV EJECTION FRACTION BIPLANE: 49 %
LYMPHOCYTES # BLD AUTO: 0.93 X10*3/UL (ref 0.8–3)
LYMPHOCYTES NFR BLD AUTO: 11 %
MAGNESIUM SERPL-MCNC: 2.04 MG/DL (ref 1.6–2.4)
MCH RBC QN AUTO: 31.3 PG (ref 26–34)
MCHC RBC AUTO-ENTMCNC: 32 G/DL (ref 32–36)
MCV RBC AUTO: 98 FL (ref 80–100)
MITRAL VALVE E/A RATIO: 0.79
MONOCYTES # BLD AUTO: 0.54 X10*3/UL (ref 0.05–0.8)
MONOCYTES NFR BLD AUTO: 6.4 %
NEUTROPHILS # BLD AUTO: 6.73 X10*3/UL (ref 1.6–5.5)
NEUTROPHILS NFR BLD AUTO: 79.8 %
NRBC BLD-RTO: 0 /100 WBCS (ref 0–0)
P AXIS: 10 DEGREES
P OFFSET: 193 MS
P ONSET: 131 MS
PLATELET # BLD AUTO: 156 X10*3/UL (ref 150–450)
POTASSIUM SERPL-SCNC: 4.2 MMOL/L (ref 3.5–5.3)
PR INTERVAL: 160 MS
Q ONSET: 211 MS
QRS COUNT: 12 BEATS
QRS DURATION: 112 MS
QT INTERVAL: 398 MS
QTC CALCULATION(BAZETT): 432 MS
QTC FREDERICIA: 421 MS
R AXIS: -45 DEGREES
RBC # BLD AUTO: 3.96 X10*6/UL (ref 4–5.2)
RH FACTOR (ANTIGEN D): NORMAL
RIGHT VENTRICLE PEAK SYSTOLIC PRESSURE: 35.9 MMHG
SODIUM SERPL-SCNC: 139 MMOL/L (ref 136–145)
T AXIS: 52 DEGREES
T OFFSET: 410 MS
TRICUSPID ANNULAR PLANE SYSTOLIC EXCURSION: 2.5 CM
VENTRICULAR RATE: 71 BPM
WBC # BLD AUTO: 8.4 X10*3/UL (ref 4.4–11.3)

## 2024-06-17 PROCEDURE — 2500000002 HC RX 250 W HCPCS SELF ADMINISTERED DRUGS (ALT 637 FOR MEDICARE OP, ALT 636 FOR OP/ED): Performed by: NURSE PRACTITIONER

## 2024-06-17 PROCEDURE — 93005 ELECTROCARDIOGRAM TRACING: CPT

## 2024-06-17 PROCEDURE — 93325 DOPPLER ECHO COLOR FLOW MAPG: CPT

## 2024-06-17 PROCEDURE — C1760 CLOSURE DEV, VASC: HCPCS | Performed by: INTERNAL MEDICINE

## 2024-06-17 PROCEDURE — 33361 REPLACE AORTIC VALVE PERQ: CPT | Performed by: INTERNAL MEDICINE

## 2024-06-17 PROCEDURE — 2500000005 HC RX 250 GENERAL PHARMACY W/O HCPCS: Performed by: INTERNAL MEDICINE

## 2024-06-17 PROCEDURE — 02RF38Z REPLACEMENT OF AORTIC VALVE WITH ZOOPLASTIC TISSUE, PERCUTANEOUS APPROACH: ICD-10-PCS | Performed by: THORACIC SURGERY (CARDIOTHORACIC VASCULAR SURGERY)

## 2024-06-17 PROCEDURE — 83735 ASSAY OF MAGNESIUM: CPT | Performed by: NURSE PRACTITIONER

## 2024-06-17 PROCEDURE — 86901 BLOOD TYPING SEROLOGIC RH(D): CPT | Performed by: NURSE PRACTITIONER

## 2024-06-17 PROCEDURE — 82374 ASSAY BLOOD CARBON DIOXIDE: CPT | Performed by: NURSE PRACTITIONER

## 2024-06-17 PROCEDURE — 2720000007 HC OR 272 NO HCPCS: Performed by: INTERNAL MEDICINE

## 2024-06-17 PROCEDURE — 93321 DOPPLER ECHO F-UP/LMTD STD: CPT | Performed by: INTERNAL MEDICINE

## 2024-06-17 PROCEDURE — 2500000001 HC RX 250 WO HCPCS SELF ADMINISTERED DRUGS (ALT 637 FOR MEDICARE OP): Performed by: INTERNAL MEDICINE

## 2024-06-17 PROCEDURE — 85347 COAGULATION TIME ACTIVATED: CPT | Mod: 91 | Performed by: INTERNAL MEDICINE

## 2024-06-17 PROCEDURE — 93325 DOPPLER ECHO COLOR FLOW MAPG: CPT | Performed by: INTERNAL MEDICINE

## 2024-06-17 PROCEDURE — 2550000001 HC RX 255 CONTRASTS: Performed by: INTERNAL MEDICINE

## 2024-06-17 PROCEDURE — 2500000004 HC RX 250 GENERAL PHARMACY W/ HCPCS (ALT 636 FOR OP/ED): Mod: JZ | Performed by: NURSE PRACTITIONER

## 2024-06-17 PROCEDURE — 82947 ASSAY GLUCOSE BLOOD QUANT: CPT

## 2024-06-17 PROCEDURE — 2780000003 HC OR 278 NO HCPCS: Performed by: INTERNAL MEDICINE

## 2024-06-17 PROCEDURE — 2500000001 HC RX 250 WO HCPCS SELF ADMINISTERED DRUGS (ALT 637 FOR MEDICARE OP): Performed by: NURSE PRACTITIONER

## 2024-06-17 PROCEDURE — 85025 COMPLETE CBC W/AUTO DIFF WBC: CPT | Performed by: NURSE PRACTITIONER

## 2024-06-17 PROCEDURE — C1756 CATH, PACING, TRANSESOPH: HCPCS | Performed by: INTERNAL MEDICINE

## 2024-06-17 PROCEDURE — 1200000002 HC GENERAL ROOM WITH TELEMETRY DAILY

## 2024-06-17 PROCEDURE — G0269 OCCLUSIVE DEVICE IN VEIN ART: HCPCS | Mod: TC | Performed by: INTERNAL MEDICINE

## 2024-06-17 PROCEDURE — C1889 IMPLANT/INSERT DEVICE, NOC: HCPCS | Performed by: INTERNAL MEDICINE

## 2024-06-17 PROCEDURE — 93010 ELECTROCARDIOGRAM REPORT: CPT | Performed by: INTERNAL MEDICINE

## 2024-06-17 PROCEDURE — 2500000004 HC RX 250 GENERAL PHARMACY W/ HCPCS (ALT 636 FOR OP/ED): Performed by: INTERNAL MEDICINE

## 2024-06-17 PROCEDURE — C1894 INTRO/SHEATH, NON-LASER: HCPCS | Performed by: INTERNAL MEDICINE

## 2024-06-17 PROCEDURE — 93308 TTE F-UP OR LMTD: CPT | Performed by: INTERNAL MEDICINE

## 2024-06-17 PROCEDURE — C1769 GUIDE WIRE: HCPCS | Performed by: INTERNAL MEDICINE

## 2024-06-17 PROCEDURE — 85347 COAGULATION TIME ACTIVATED: CPT

## 2024-06-17 PROCEDURE — 33361 REPLACE AORTIC VALVE PERQ: CPT | Performed by: THORACIC SURGERY (CARDIOTHORACIC VASCULAR SURGERY)

## 2024-06-17 DEVICE — EDWARDS SAPIEN 3 ULTRA TRANSCATHETER HEART VALVE
Type: IMPLANTABLE DEVICE | Site: HEART | Status: FUNCTIONAL
Brand: EDWARDS SAPIEN 3 ULTRA TRANSCATHETER HEART VALVE

## 2024-06-17 RX ORDER — LIDOCAINE HYDROCHLORIDE 10 MG/ML
INJECTION, SOLUTION EPIDURAL; INFILTRATION; INTRACAUDAL; PERINEURAL AS NEEDED
Status: DISCONTINUED | OUTPATIENT
Start: 2024-06-17 | End: 2024-06-17 | Stop reason: HOSPADM

## 2024-06-17 RX ORDER — PANTOPRAZOLE SODIUM 40 MG/10ML
40 INJECTION, POWDER, LYOPHILIZED, FOR SOLUTION INTRAVENOUS
Status: DISCONTINUED | OUTPATIENT
Start: 2024-06-18 | End: 2024-06-18 | Stop reason: HOSPADM

## 2024-06-17 RX ORDER — DEXTROSE 50 % IN WATER (D50W) INTRAVENOUS SYRINGE
25
Status: DISCONTINUED | OUTPATIENT
Start: 2024-06-17 | End: 2024-06-18 | Stop reason: HOSPADM

## 2024-06-17 RX ORDER — SODIUM CHLORIDE, SODIUM LACTATE, POTASSIUM CHLORIDE, CALCIUM CHLORIDE 600; 310; 30; 20 MG/100ML; MG/100ML; MG/100ML; MG/100ML
75 INJECTION, SOLUTION INTRAVENOUS CONTINUOUS
Status: ACTIVE | OUTPATIENT
Start: 2024-06-17 | End: 2024-06-17

## 2024-06-17 RX ORDER — PROTAMINE SULFATE 10 MG/ML
INJECTION, SOLUTION INTRAVENOUS CONTINUOUS PRN
Status: COMPLETED | OUTPATIENT
Start: 2024-06-17 | End: 2024-06-17

## 2024-06-17 RX ORDER — ONDANSETRON 4 MG/1
4 TABLET, FILM COATED ORAL EVERY 8 HOURS PRN
Status: DISCONTINUED | OUTPATIENT
Start: 2024-06-17 | End: 2024-06-18 | Stop reason: HOSPADM

## 2024-06-17 RX ORDER — ASPIRIN 325 MG
TABLET ORAL AS NEEDED
Status: DISCONTINUED | OUTPATIENT
Start: 2024-06-17 | End: 2024-06-17 | Stop reason: HOSPADM

## 2024-06-17 RX ORDER — INSULIN DEGLUDEC 100 U/ML
8 INJECTION, SOLUTION SUBCUTANEOUS NIGHTLY
Status: DISCONTINUED | OUTPATIENT
Start: 2024-06-17 | End: 2024-06-18 | Stop reason: HOSPADM

## 2024-06-17 RX ORDER — CEFAZOLIN SODIUM 2 G/100ML
2 INJECTION, SOLUTION INTRAVENOUS ONCE
Status: COMPLETED | OUTPATIENT
Start: 2024-06-17 | End: 2024-06-17

## 2024-06-17 RX ORDER — TRAMADOL HYDROCHLORIDE 50 MG/1
50 TABLET ORAL EVERY 6 HOURS PRN
Status: DISCONTINUED | OUTPATIENT
Start: 2024-06-17 | End: 2024-06-18 | Stop reason: HOSPADM

## 2024-06-17 RX ORDER — TRAZODONE HYDROCHLORIDE 50 MG/1
50 TABLET ORAL NIGHTLY PRN
Status: DISCONTINUED | OUTPATIENT
Start: 2024-06-17 | End: 2024-06-18 | Stop reason: HOSPADM

## 2024-06-17 RX ORDER — ONDANSETRON HYDROCHLORIDE 2 MG/ML
4 INJECTION, SOLUTION INTRAVENOUS EVERY 8 HOURS PRN
Status: DISCONTINUED | OUTPATIENT
Start: 2024-06-17 | End: 2024-06-18 | Stop reason: HOSPADM

## 2024-06-17 RX ORDER — PANTOPRAZOLE SODIUM 40 MG/1
40 TABLET, DELAYED RELEASE ORAL
Status: DISCONTINUED | OUTPATIENT
Start: 2024-06-18 | End: 2024-06-18 | Stop reason: HOSPADM

## 2024-06-17 RX ORDER — ACETAMINOPHEN 325 MG/1
650 TABLET ORAL EVERY 6 HOURS PRN
Status: DISCONTINUED | OUTPATIENT
Start: 2024-06-17 | End: 2024-06-18 | Stop reason: HOSPADM

## 2024-06-17 RX ORDER — MIDAZOLAM HYDROCHLORIDE 1 MG/ML
INJECTION, SOLUTION INTRAMUSCULAR; INTRAVENOUS AS NEEDED
Status: DISCONTINUED | OUTPATIENT
Start: 2024-06-17 | End: 2024-06-17 | Stop reason: HOSPADM

## 2024-06-17 RX ORDER — NAPROXEN SODIUM 220 MG/1
81 TABLET, FILM COATED ORAL DAILY
Status: DISCONTINUED | OUTPATIENT
Start: 2024-06-18 | End: 2024-06-18 | Stop reason: HOSPADM

## 2024-06-17 RX ORDER — INSULIN LISPRO 100 [IU]/ML
0-10 INJECTION, SOLUTION INTRAVENOUS; SUBCUTANEOUS
Status: DISCONTINUED | OUTPATIENT
Start: 2024-06-17 | End: 2024-06-18 | Stop reason: HOSPADM

## 2024-06-17 RX ORDER — HEPARIN SODIUM 1000 [USP'U]/ML
INJECTION, SOLUTION INTRAVENOUS; SUBCUTANEOUS AS NEEDED
Status: DISCONTINUED | OUTPATIENT
Start: 2024-06-17 | End: 2024-06-17 | Stop reason: HOSPADM

## 2024-06-17 RX ORDER — DEXTROSE 50 % IN WATER (D50W) INTRAVENOUS SYRINGE
12.5
Status: DISCONTINUED | OUTPATIENT
Start: 2024-06-17 | End: 2024-06-18 | Stop reason: HOSPADM

## 2024-06-17 RX ORDER — SENNOSIDES 8.6 MG/1
2 TABLET ORAL 2 TIMES DAILY
Status: DISCONTINUED | OUTPATIENT
Start: 2024-06-17 | End: 2024-06-18 | Stop reason: HOSPADM

## 2024-06-17 RX ORDER — FENTANYL CITRATE 50 UG/ML
INJECTION, SOLUTION INTRAMUSCULAR; INTRAVENOUS AS NEEDED
Status: DISCONTINUED | OUTPATIENT
Start: 2024-06-17 | End: 2024-06-17 | Stop reason: HOSPADM

## 2024-06-17 SDOH — SOCIAL STABILITY: SOCIAL INSECURITY: HAVE YOU HAD ANY THOUGHTS OF HARMING ANYONE ELSE?: NO

## 2024-06-17 SDOH — SOCIAL STABILITY: SOCIAL INSECURITY: HAS ANYONE EVER THREATENED TO HURT YOUR FAMILY OR YOUR PETS?: NO

## 2024-06-17 SDOH — SOCIAL STABILITY: SOCIAL INSECURITY: HAVE YOU HAD THOUGHTS OF HARMING ANYONE ELSE?: NO

## 2024-06-17 SDOH — SOCIAL STABILITY: SOCIAL INSECURITY: WERE YOU ABLE TO COMPLETE ALL THE BEHAVIORAL HEALTH SCREENINGS?: YES

## 2024-06-17 SDOH — SOCIAL STABILITY: SOCIAL INSECURITY: ARE YOU OR HAVE YOU BEEN THREATENED OR ABUSED PHYSICALLY, EMOTIONALLY, OR SEXUALLY BY ANYONE?: NO

## 2024-06-17 SDOH — SOCIAL STABILITY: SOCIAL INSECURITY: DOES ANYONE TRY TO KEEP YOU FROM HAVING/CONTACTING OTHER FRIENDS OR DOING THINGS OUTSIDE YOUR HOME?: NO

## 2024-06-17 SDOH — SOCIAL STABILITY: SOCIAL INSECURITY: ARE THERE ANY APPARENT SIGNS OF INJURIES/BEHAVIORS THAT COULD BE RELATED TO ABUSE/NEGLECT?: NO

## 2024-06-17 SDOH — SOCIAL STABILITY: SOCIAL INSECURITY: ABUSE: ADULT

## 2024-06-17 SDOH — SOCIAL STABILITY: SOCIAL INSECURITY: DO YOU FEEL ANYONE HAS EXPLOITED OR TAKEN ADVANTAGE OF YOU FINANCIALLY OR OF YOUR PERSONAL PROPERTY?: NO

## 2024-06-17 SDOH — SOCIAL STABILITY: SOCIAL INSECURITY: DO YOU FEEL UNSAFE GOING BACK TO THE PLACE WHERE YOU ARE LIVING?: NO

## 2024-06-17 ASSESSMENT — COGNITIVE AND FUNCTIONAL STATUS - GENERAL
DAILY ACTIVITIY SCORE: 24
PATIENT BASELINE BEDBOUND: NO
MOBILITY SCORE: 24
DAILY ACTIVITIY SCORE: 24
MOBILITY SCORE: 24

## 2024-06-17 ASSESSMENT — LIFESTYLE VARIABLES
HOW OFTEN DO YOU HAVE A DRINK CONTAINING ALCOHOL: NEVER
HOW OFTEN DO YOU HAVE 6 OR MORE DRINKS ON ONE OCCASION: NEVER
SKIP TO QUESTIONS 9-10: 1
HOW MANY STANDARD DRINKS CONTAINING ALCOHOL DO YOU HAVE ON A TYPICAL DAY: PATIENT DOES NOT DRINK
AUDIT-C TOTAL SCORE: 0
AUDIT-C TOTAL SCORE: 0

## 2024-06-17 ASSESSMENT — PATIENT HEALTH QUESTIONNAIRE - PHQ9
SUM OF ALL RESPONSES TO PHQ9 QUESTIONS 1 & 2: 0
2. FEELING DOWN, DEPRESSED OR HOPELESS: NOT AT ALL
1. LITTLE INTEREST OR PLEASURE IN DOING THINGS: NOT AT ALL

## 2024-06-17 ASSESSMENT — PAIN - FUNCTIONAL ASSESSMENT
PAIN_FUNCTIONAL_ASSESSMENT: 0-10
PAIN_FUNCTIONAL_ASSESSMENT: 0-10

## 2024-06-17 ASSESSMENT — COLUMBIA-SUICIDE SEVERITY RATING SCALE - C-SSRS
6. HAVE YOU EVER DONE ANYTHING, STARTED TO DO ANYTHING, OR PREPARED TO DO ANYTHING TO END YOUR LIFE?: NO
1. IN THE PAST MONTH, HAVE YOU WISHED YOU WERE DEAD OR WISHED YOU COULD GO TO SLEEP AND NOT WAKE UP?: NO
2. HAVE YOU ACTUALLY HAD ANY THOUGHTS OF KILLING YOURSELF?: NO

## 2024-06-17 ASSESSMENT — ACTIVITIES OF DAILY LIVING (ADL)
TOILETING: INDEPENDENT
ADEQUATE_TO_COMPLETE_ADL: YES
GROOMING: INDEPENDENT
WALKS IN HOME: INDEPENDENT
HEARING - LEFT EAR: DIFFICULTY WITH NOISE
JUDGMENT_ADEQUATE_SAFELY_COMPLETE_DAILY_ACTIVITIES: YES
DRESSING YOURSELF: INDEPENDENT
HEARING - RIGHT EAR: DIFFICULTY WITH NOISE
BATHING: INDEPENDENT
LACK_OF_TRANSPORTATION: NO
FEEDING YOURSELF: INDEPENDENT
PATIENT'S MEMORY ADEQUATE TO SAFELY COMPLETE DAILY ACTIVITIES?: YES

## 2024-06-17 ASSESSMENT — PAIN SCALES - GENERAL
PAINLEVEL_OUTOF10: 3
PAINLEVEL_OUTOF10: 0 - NO PAIN
PAINLEVEL_OUTOF10: 0 - NO PAIN
PAINLEVEL_OUTOF10: 3

## 2024-06-17 ASSESSMENT — PAIN DESCRIPTION - LOCATION: LOCATION: NECK

## 2024-06-17 NOTE — CARE PLAN
The patient's goals for the shift include      The clinical goals for the shift include Have no oozing or bleeding from site    Bruising on left groin NP Silkeosiris Christianson aware. Will continue to monitor      Problem: Cardiac catheterization  Goal: Free from dysrhythmias  Outcome: Progressing  Goal: Free from pain  Outcome: Progressing

## 2024-06-17 NOTE — DISCHARGE INSTRUCTIONS
Kristin Charles is now s/p TAVR Scottie 3 26mm via B/L femoral artery (Left primary) on 6/17/24 with Dr. Lazaro and Dr. Aranda.  Pt developed a new LBBB post procedure, temp wire mainatained.  Final procedure ECHO showed a mean gradient of 4, no PVL, stable triv PC effusion.  Pt transferred to the step-down for monitoring.    Pre EKG - SR 71 with iRBBB and LAFB (, )  Post EKG - SB 56 with iRBBB (, )    POD 1 - No issues overnight.  Vitals, labs, neuro assessment, and groins remained stable.  Pt denies CP, SOB, abd/groin pain, numbness or tingling of BLE.  Stable chronic HFmrEF as evidence by no significant FVO on assessment with trace bipedal edema, mostly clear lungs, sats stable on RA, CXR showed no acute process.  POD 1 EKG - SR 74 with LAFB (, ).  No events observed on Tele:  TVP removed and later RIJ cordis removed without incident.  POD 1 ECHO - EF 45-50%, no significant AI/PVL, gradients 12/6, no significant PC effusion.   Pt discharged home on POD 1 after ambulating around the Step-down.     Plan:  - D/C home today 6/18/2024  - Cont ASA 81mg (for life)  - Continue home Metoprolol dose (starting today 6/18/24)  - Continue home doses of Jardiance and Entresto  - Continue Prednisone and Tresiba taper as prior to admission to hospital  - Continue home medications otherwise  - follow up with [facility] clinic in one week, 1 month, and 1 year  - f/w Dr. Fang (Primary Cards) as scheduled  - f/w Melanie Muñoz, DO in 1-2 weeks  - pt given Lab recs (1 week) and ECHO rec (1 month)     Travis Christensen MSN, AGAP-BC  Acute Care Nurse Practitioner  Structural Heart / TAVR Team  [phone] (ph)  [phone] (fax)          ####  POST VALVE PROCEDURE DISCHARGE INSTRUCTIONS   ####    - Please weigh yourself daily (first thing in the morning) and record reading  - Please take and record your blood pressure 2 times a day (at least 60-90 mins AFTER you take your  meds)  PLEASE HAVE THESE READINGS AVAILABLE DURING YOUR FOLLOW-UP APPOINTMENTS!!    - NO DRIVING OR LIFTING/PULLING/PUSHING GREATER THAN 10 POUNDS FOR 1 WEEK FROM YOUR PROCEDURE DATE.    - A lab requisition has been provided for you to draw a CBC, BMP, and PT/INR.  Please take this rec to your local lab to have your labs drawn between 4-7 days post discharge.     - You have been given the order requisition for the 1 month ECHO at the time of your discharge.  Please call and schedule this ECHO at your LOCAL center (no sooner than 23 days after your procedure date).    - You will have 3 appointments that are vital to your post procedure care, these will be scheduled for you IF YOU NEED TO CHANGE YOUR APPOINTMENT TIME/DATE, PLEASE CALL [phone]   - Please follow up with your PCP within 7-14 days of discharge  - You will follow up with your primary cardiologist in 6-10 weeks    **** No elective dental procedures or cleanings for 3 months post procedure - You will need dental prophylaxis (oral antibiotic) prior to dental work/cleanings for life *****    Please call the STRUCTURAL HEART TEAM LINE if you have any questions or concerns - [phone]     ****   CALL PROVIDER IF:   ****  - Breathing faster than normal.   - Breathing harder than normal or having retractions.   - Fever of 100.4 F (38 C) or higher.   - Chills.   - Drinking less than normal.   - Urinating less than normal, over 1 day.   - Acting very sleepy and difficult to awaken.   - Vomiting (throwing up) and not able to eat or drink for 12 hours.   - 3 or more loose, watery bowel movements in 24 hours (diarrhea).    -Any new concerning symptoms.    - If you develop difficulty breathing, rash, hives, severe nausea, vomiting, light-headedness or any signs of infection, immediately contact your doctor and go to the nearest emergency room.      #####   MISC. HOME-GOING INFO   #####  - DO NOT drink any alcoholic drinks or take any non-prescriptive  medications that contain alcohol for the first 24 hours.   - DO NOT make any important decisions for the first 24 hours.      ACTIVITY:  - You are advised to go directly home from the hospital.   - DO NOT lift anything heavier than 10 pounds for one week, this allows for proper healing of the groin.   - No excessive exercise or treadmill use for one week. You may walk and do stairs, slowly.   - No sexual activities for 24 hours after you arrive home.      WOUND CARE:  - If slight bleeding should occur at site, lie down and have someone apply firm pressure just above the puncture site for 5 minutes.  If it continues or is profuse, call 911. Always notify your doctor if bleeding occurs.   - Keep site clean and dry. Let air dry or you may use a simple bandaid.   - Gently cleanse the puncture site in your groin with soap and water only.   - You may experience some tenderness, bruising or minimal inflammation.  If you have any concerns, you may contact the Cath Lab or if any of these symptoms become excessive, contact your cardiologist or go to the emergency room.   - No tub baths, soaking, or swimming for one week.   - May shower the next day after your procedure.      DIET:  - You may resume your normal diet. However, be mindful of your sodium intake.  Ideally you should try to limit your daily intake of sodium to 2-3g a day      HEART FAILURE SPECIFIC INSTRUCTIONS:   - CALL 911 IF YOU HAVE ANY OF THE SIGNS AND SYMPTOMS OF HEART FAILURE:   1. Chest pain   2. Significant Shortness of breath   3. Fainting.   - Notify your physician immediately if you have shortness of breath; weight gain of 3 lbs. or more; fatigue and loss of energy; swelling of lower extremities or abdomen; dizziness or fainting; change of appetite; and frequent coughing.   - Daily weight on the same scale, same time after voiding and before eating.   - Maintain daily weight log.

## 2024-06-17 NOTE — PROGRESS NOTES
"Pharmacy Medication History Review    Kristin Charles \"Edel\" is a 78 y.o. female admitted for Aortic valve stenosis. Pharmacy reviewed the patient's cmixs-dt-lvpvvxrsd medications and allergies for accuracy.    The list below reflects the updated PTA list.   Comments regarding how patient may be taking medications differently can be found in the Admit Orders Activity  Prior to Admission Medications   Prescriptions Last Dose Informant Patient Reported?   aspirin 81 mg EC tablet 6/17/2024 Self Yes   Sig: Take 1 tablet (81 mg) by mouth once daily.   biotin 10 mg tablet Past Week Self Yes   Sig: Take 1 tablet (10 mg) by mouth once daily.   blood sugar diagnostic (Blood Glucose Test) strip  Self No   Sig: Use 1 strip 2 times a day to test blood sugar.   blood-glucose meter misc  Self No   Sig: Please check blood sugars 2 times daily   empagliflozin (Jardiance) 25 mg 6/16/2024 Self No   Sig: Take 1 tablet (25 mg) by mouth once daily.   famotidine (Pepcid) 10 mg tablet Unknown Self Yes   Sig: Take 1 tablet (10 mg) by mouth once daily as needed for heartburn.   fluconazole (Diflucan) 150 mg tablet   No   Sig: Take 1 tablet (150 mg) by mouth 1 time for 1 dose.   insulin degludec (Tresiba FlexTouch) 100 unit/mL (3 mL) injection 6/15/2024 Self No   Sig: Inject 8 Units under the skin once daily at bedtime. Take as directed per insulin instructions.   lancets misc  Self No   Sig: Please check blood sugar 2 times daily   levothyroxine (Synthroid, Levoxyl) 125 mcg tablet 6/17/2024 at 0200 Self No   Sig: Take 1 tablet (125 mcg) by mouth once daily.   metoprolol succinate XL (Toprol-XL) 25 mg 24 hr tablet 6/17/2024 Self No   Sig: Take 1 tablet (25 mg) by mouth once daily. Do not crush or chew.   pen needle, diabetic 31 gauge x 5/16\" needle  Self No   Sig: Use as directed to inject insulin once daily   predniSONE (Deltasone) 1 mg tablet 6/17/2024 Self No   Sig: Take 2 tablets (2 mg) by mouth once daily.   predniSONE (Deltasone) 5 mg " "tablet 6/17/2024 Self No   Sig: Take 1 tablet (5 mg) by mouth once daily.   sacubitriL-valsartan (Entresto) 24-26 mg tablet 6/16/2024 Self No   Sig: Take 1 tablet by mouth once daily.   traZODone (Desyrel) 50 mg tablet 6/16/2024 Self Yes   Sig: Take 1 tablet (50 mg) by mouth as needed at bedtime.      Facility-Administered Medications: None        The list below reflects the updated allergy list. Please review each documented allergy for additional clarification and justification.  Allergies  Reviewed by RAZA Wilhelm-LAURA on 6/17/2024        Severity Reactions Comments    Other Not Specified Other     Sulfa (sulfonamide Antibiotics) Not Specified Unknown     Miconazole Low Rash     Neomycin-bacitracin-polymyxin Low Rash             M2B service not offered prior to surgery, please reassess prior to patient discharge if Meds to Beds is desired.  Local Pharm[facility]: [facility]     Sources:   Pt interview - familiar with medications and has list.  Dispense hx  OARRS - no recent hx   06/06/24 rheumatology office visit note    Additional Comments:  None       Arsenio Espinoza, PharmD  Transitions of Care Pharmacist  06/17/24     Secure Chat preferred   If no response call [id number] or [employer] \"Med Rec\"   " Class II - visualization of the soft palate, fauces, and uvula

## 2024-06-17 NOTE — H&P
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 She was noted to have have aortic valve disease during work up for a murmur heard at her PCP's office. On transthoracic echocardiogram ( per report, images unavailable for review)  Independent with ADLs, still drives, she is active and completes household chores indoors and outdoors. However she states that she takes longer with chores, yard work and long walks due to exertional fatigue and dyspnea. No chest pain, no syncope or presyncope, no orthopnea, no PND, no leg swelling. No heart failure hospitalization.     Recent prednisone for arthralgias which has helped  with symptoms currently 10mg daily and being tapered.     She was evaluated at [facility] and deemed to be moderate/severe she preferred to wait for symptoms to occur. She was also being triaged with brain natiuretic peptide given symptom burden which was found to be elevated. However she decided to seek a second opinion.     She lives at home but will like to down size. Currently lives with her son. Delaying her move to a smaller house until aortic valve disease is addressed and son's situation is sorted out     Work up so far.     EKG 05/15/2023- HR 60bpm, LAFB.   Cath [facility] 11/27/2023 ( images reviewed in [facility]) - No obstructive CAD  TTE ( per report) LVEF 38% +/- 5% normal RV size and function, Moderate aortic regurgitation, moderate aortic stenosis KAREEM 1.02cm2 ( AVAi 0.56cm2/m2 => severe), mean gradient 15 mmHg, DI 0.33  MICKEY 3/19/2024 ( images reviewed in [facility]) - LVEF 40-45%, severe aortic regurgitation, severe aortic stenosis ( planimetered KAREEM 0.99cm2, indexed to BSA 0.56cm2/m2 where severe is AVAi < 0.06cm2/m2) moderate mitral regurgitation. No other significant valve abnormalities.  CTA 1/2024 - AVCS per report - 247 Agaston unit \"likely underestimated relative to standard threshold 130 HU\"  repeat pending below  ** CTA TAVR 5/14/2024- AVCS 988 Agaston units, Annulus " "suitable for 29 Evolut FX or 26 Scottie ultra adequate iliofemoral accesses bilaterally for either prosthesis**  NM SPECT for amyloid - Not consistent with TTR amyloidosis  Dental clearance - completed  PFTs 1/2024 - Mld obstruction, normal diffusion capacity  NT Pro BNP 1289 Feb 2024     PAST MEDICAL HISTORY  HFrEF, LVEF 35-40% ( TTE 2/9/2024)  Hypertension  Hyperlipidemia  Hypothyroidism  LF-LG Aortic stenosis  Bilateral total knee arthroplasties  Polymyalgia rheumatica on prednisone     Last Recorded Vitals:  Vitals       Vitals:     05/13/24 1447   BP: 111/59   Pulse: 57   Resp: 16   Temp: 36.3 °C (97.4 °F)   SpO2: 96%   Weight: 68 kg (150 lb)   Height: 1.575 m (5' 2\")            Past Medical History:  She has a past medical history of Actinic keratosis, Acute candidiasis of vulva and vagina, Acute laryngitis (03/20/2018), Baker's cyst of knee (04/06/2023), Bitten or stung by nonvenomous insect and other nonvenomous arthropods, initial encounter (09/02/2015), Candidiasis, unspecified (10/13/2017), Chronic ethmoidal sinusitis (11/24/2020), Chronic maxillary sinusitis (08/06/2020), Disease of thyroid gland, Eczema, Heart murmur, Heart valve disease, Laceration without foreign body of unspecified forearm, initial encounter (01/09/2022), Lumbago with sciatica, left side (03/29/2019), 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), Nail disorder, unspecified (08/08/2023), Neoplasm of uncertain behavior of skin (08/08/2023), Obesity (01/19/2015), Onychomycosis (04/06/2023), Open wound of hip and thigh with tendon involvement (05/24/2018), Overweight (BMI 25.0-29.9) (04/06/2023), Personal history of diseases of the skin and subcutaneous tissue (01/09/2022), Personal history of diseases of the skin and subcutaneous tissue (09/02/2015), Personal history of other diseases of the musculoskeletal system and connective tissue, Personal history of other diseases of the nervous system and sense organs, Personal history of other diseases of " the nervous system and sense organs (09/20/2019), Personal history of other endocrine, nutritional and metabolic disease, Personal history of other infectious and parasitic diseases (09/23/2017), Personal history of other specified conditions (03/06/2020), Rash and other nonspecific skin eruption (06/15/2015), Rhinitis (10 years), Sciatica (12/11/2014), 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     Past Surgical History:  She has a past surgical history that includes Cataract extraction (06/30/2014); Other surgical history (01/09/2022); Tonsillectomy; Dilation and curettage of uterus; Total knee arthroplasty; Joint replacement; and Skin biopsy.      Social History:  She reports that she quit smoking about 59 years ago. Her smoking use included cigarettes. She started smoking about 64 years ago. She has a 1.3 pack-year smoking history. She has never been exposed to tobacco smoke. She has never used smokeless tobacco. She reports that she does not drink alcohol and does not use drugs.     Family History:  Family History          Family History   Problem Relation Name Age of Onset    Cancer Sister Kaila              Allergies:  Morphine, Other, Sulfa (sulfonamide antibiotics), Miconazole, and Neomycin-bacitracin-polymyxin     Outpatient Medications:       Current Outpatient Medications   Medication Instructions    aspirin 81 mg, oral, Daily RT    biotin 10 mg tablet 1 tablet, oral, Daily    blood sugar diagnostic (Blood Glucose Test) strip Use 1 strip 2 times a day to test blood sugar.    blood-glucose meter misc Please check blood sugars 2 times daily    celecoxib (CeleBREX) 200 mg capsule 1 capsule, oral, Daily, With a meal    empagliflozin (JARDIANCE)  "25 mg, oral, Daily    famotidine (PEPCID) 10 mg, oral, Daily PRN    lancets misc Please check blood sugar 2 times daily    Lantus U-100 Insulin 10 Units, subcutaneous, Nightly, Take as directed per insulin instructions.    levothyroxine (SYNTHROID, LEVOXYL) 125 mcg, oral, Daily    metoprolol succinate XL (TOPROL-XL) 25 mg, oral, Daily, Do not crush or chew.    mometasone (Elocon) 0.1 % lotion Use 3 drops topically to affected ear PRN itching    nystatin (Mycostatin) cream Apply 2x daily x 1 month; repeat if needed    pantoprazole (PROTONIX) 40 mg, oral, Daily, Do not crush, chew, or split.    pen needle, diabetic 31 gauge x 5/16\" needle Use as directed to inject insulin once daily    predniSONE (DELTASONE) 12.5 mg, oral, Daily    sacubitriL-valsartan (Entresto) 24-26 mg tablet 1 tablet, oral, Daily    traZODone (Desyrel) 50 mg tablet 1 tablet, oral, Nightly PRN    Tresiba FlexTouch U-100 10 Units, subcutaneous, Nightly, Take as directed per insulin instructions.         Physical Exam:  Physical Exam  Constitutional:       Appearance: Normal appearance.   HENT:      Head: Normocephalic and atraumatic.   Cardiovascular:      Rate and Rhythm: Normal rate and regular rhythm.   Pulmonary:      Effort: Pulmonary effort is normal.   Musculoskeletal:         General: No swelling.   Skin:     General: Skin is warm.   Neurological:      General: No focal deficit present.      Mental Status: She is alert.   Psychiatric:         Mood and Affect: Mood normal.            Last Labs:  CBC -        Lab Results   Component Value Date     WBC 6.2 03/16/2024     HGB 14.0 03/16/2024     HCT 43.9 03/16/2024     MCV 93 03/16/2024      03/16/2024         CMP -        Lab Results   Component Value Date     CALCIUM 9.2 04/11/2024     PHOS 3.3 10/07/2022     PROT 6.1 (L) 03/16/2024     ALBUMIN 3.6 03/16/2024     AST 12 03/16/2024     ALT 15 03/16/2024     ALKPHOS 57 03/16/2024     BILITOT 0.7 03/16/2024         LIPID PANEL -       "   Lab Results   Component Value Date     CHOL 171 11/15/2023     TRIG 136 11/15/2023     HDL 53.6 11/15/2023     CHHDL 3.2 11/15/2023     LDLF 77 11/05/2021     VLDL 27 11/15/2023     NHDL 117 11/15/2023         RENAL FUNCTION PANEL -         Lab Results   Component Value Date     GLUCOSE 188 (H) 04/11/2024      04/11/2024     K 4.0 04/11/2024      04/11/2024     CO2 27 04/11/2024     ANIONGAP 15 04/11/2024     BUN 22 04/11/2024     CREATININE 0.94 04/11/2024     GFRMALE CANCELED 05/27/2023     CALCIUM 9.2 04/11/2024     PHOS 3.3 10/07/2022     ALBUMIN 3.6 03/16/2024               Lab Results   Component Value Date     HGBA1C 11.6 (A) 05/07/2024         ASSESSMENT AND PLAN       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 She is symptomatic. She has elevated brain natriuretic peptide. She has systolic dysfunction associated with these valve diseases in the absence of demonstrable ischemia or previous history of non-ischemic cardiomyopathy.     The rationale for treatment ie symptoms ,decreased LVEF, elevated basic metabolic panel were discussed. The options of therapy transcatheter and surgical were also discussed. She has expressed a preference for transcatheter if anatomically suitable.     A repeat CTA TAVR was done after this visit which demonstrated anatomic suitability for transcatheter aortic valve replacement.     Plan for JUNIOR Today

## 2024-06-17 NOTE — POST-PROCEDURE NOTE
Physician Transition of Care Summary  Invasive Cardiovascular Lab    Procedure Date: 6/17/2024  Attending: Panel 1:     * Oli Lazaro - Primary  Panel 2:     * Romeo Aranda - Primary  Resident/Fellow/Other Assistant: Surgeons and Role:  * No surgeons found with a matching role *    Indications:   Pre-op Diagnosis     * Aortic valve stenosis, etiology of cardiac valve disease unspecified [I35.0]    Post-procedure diagnosis:   Post-op Diagnosis     * Aortic valve stenosis, etiology of cardiac valve disease unspecified [I35.0]    Procedure(s):   TVP for TAVR    TAVR-OR    TAVR (Transcatheter AV Replacement)  96374 - NE REPLACE AORTIC VALVE PERQ FEMORAL ARTRY APPROACH    NE REPLACE AORTIC VALVE PERQ FEMORAL ARTRY APPROACH [56753]    Procedure Findings:   Mixed aortic valve disease  LVEDP 21 pre procedure to 14 post procedure    Description of the Procedure:   Left transfemoral TAVR with 26mm Scottie ultra    Complications:   None    Stents/Implants:   Implants       Heart Valve Repair    Valve, Heart, 26mm Sapien3 Ultra W/Commander System - [id number] - [id number] - Implanted        Inventory item: VALVE, HEART, 26MM SAPIEN3 ULTRA W/COMMANDER SYSTEM Model/Cat number: [id number]    Serial number: [id number] : [employer]    Lot number: [id number] Device identifier: [id number]      As of 6/17/2024       Status: Implanted                              Anticoagulation/Antiplatelet Plan:   PO aspirin 81 mg qd    Estimated Blood Loss:   20 mL    Anesthesia: Moderate Sedation Anesthesia Staff: No anesthesia staff entered.    Any Specimen(s) Removed:   Order Name Source Comment Collection Info Order Time   TYPE AND SCREEN Blood, Venous  Collected By: Lab, Freetext Collection 6/17/2024  7:09 AM     Release result to "[facility]"[place]   Immediate            Disposition:   [place] [id number]      Electronically signed by: Oli Lazaro MD, 6/17/2024 9:57 AM

## 2024-06-18 ENCOUNTER — APPOINTMENT (OUTPATIENT)
Dept: CARDIOLOGY | Facility: HOSPITAL | Age: 79
DRG: 267 | End: 2024-06-18
Payer: MEDICARE

## 2024-06-18 ENCOUNTER — APPOINTMENT (OUTPATIENT)
Dept: RADIOLOGY | Facility: HOSPITAL | Age: 79
DRG: 267 | End: 2024-06-18
Payer: MEDICARE

## 2024-06-18 ENCOUNTER — PHARMACY VISIT (OUTPATIENT)
Dept: PHARMACY | Facility: CLINIC | Age: 79
End: 2024-06-18
Payer: COMMERCIAL

## 2024-06-18 VITALS
HEART RATE: 76 BPM | DIASTOLIC BLOOD PRESSURE: 66 MMHG | BODY MASS INDEX: 28.6 KG/M2 | WEIGHT: 155.42 LBS | SYSTOLIC BLOOD PRESSURE: 142 MMHG | TEMPERATURE: 97.3 F | RESPIRATION RATE: 12 BRPM | OXYGEN SATURATION: 97 % | HEIGHT: 62 IN

## 2024-06-18 PROBLEM — I35.0 AORTIC VALVE STENOSIS, ETIOLOGY OF CARDIAC VALVE DISEASE UNSPECIFIED: Status: RESOLVED | Noted: 2024-06-17 | Resolved: 2024-06-18

## 2024-06-18 PROBLEM — Z95.2 S/P TAVR (TRANSCATHETER AORTIC VALVE REPLACEMENT): Status: ACTIVE | Noted: 2024-06-18

## 2024-06-18 PROBLEM — I35.0 AORTIC VALVE STENOSIS: Status: RESOLVED | Noted: 2023-11-27 | Resolved: 2024-06-18

## 2024-06-18 LAB
ANION GAP SERPL CALC-SCNC: 13 MMOL/L (ref 10–20)
AORTIC VALVE MEAN GRADIENT: 7 MMHG
AORTIC VALVE PEAK VELOCITY: 1.95 M/S
AV PEAK GRADIENT: 15.2 MMHG
AVA (PEAK VEL): 1.83 CM2
AVA (VTI): 1.83 CM2
BASOPHILS # BLD AUTO: 0.04 X10*3/UL (ref 0–0.1)
BASOPHILS NFR BLD AUTO: 0.6 %
BUN SERPL-MCNC: 18 MG/DL (ref 6–23)
CALCIUM SERPL-MCNC: 8.9 MG/DL (ref 8.6–10.6)
CHLORIDE SERPL-SCNC: 105 MMOL/L (ref 98–107)
CO2 SERPL-SCNC: 26 MMOL/L (ref 21–32)
CREAT SERPL-MCNC: 0.83 MG/DL (ref 0.5–1.05)
EGFRCR SERPLBLD CKD-EPI 2021: 71 ML/MIN/1.73M*2
EJECTION FRACTION APICAL 4 CHAMBER: 53.7
EOSINOPHIL # BLD AUTO: 0.17 X10*3/UL (ref 0–0.4)
EOSINOPHIL NFR BLD AUTO: 2.4 %
ERYTHROCYTE [DISTWIDTH] IN BLOOD BY AUTOMATED COUNT: 13.6 % (ref 11.5–14.5)
GLUCOSE BLD MANUAL STRIP-MCNC: 190 MG/DL (ref 74–99)
GLUCOSE SERPL-MCNC: 140 MG/DL (ref 74–99)
HCT VFR BLD AUTO: 41.5 % (ref 36–46)
HGB BLD-MCNC: 13.4 G/DL (ref 12–16)
IMM GRANULOCYTES # BLD AUTO: 0.03 X10*3/UL (ref 0–0.5)
IMM GRANULOCYTES NFR BLD AUTO: 0.4 % (ref 0–0.9)
LEFT ATRIUM VOLUME AREA LENGTH INDEX BSA: 31.5 ML/M2
LEFT VENTRICLE INTERNAL DIMENSION DIASTOLE: 4.3 CM (ref 3.5–6)
LEFT VENTRICULAR OUTFLOW TRACT DIAMETER: 2.1 CM
LV EJECTION FRACTION BIPLANE: 53 %
LYMPHOCYTES # BLD AUTO: 1.14 X10*3/UL (ref 0.8–3)
LYMPHOCYTES NFR BLD AUTO: 16.1 %
MAGNESIUM SERPL-MCNC: 2.03 MG/DL (ref 1.6–2.4)
MCH RBC QN AUTO: 31.3 PG (ref 26–34)
MCHC RBC AUTO-ENTMCNC: 32.3 G/DL (ref 32–36)
MCV RBC AUTO: 97 FL (ref 80–100)
MITRAL VALVE E/A RATIO: 0.57
MITRAL VALVE E/E' RATIO: 14.14
MONOCYTES # BLD AUTO: 0.89 X10*3/UL (ref 0.05–0.8)
MONOCYTES NFR BLD AUTO: 12.6 %
NEUTROPHILS # BLD AUTO: 4.82 X10*3/UL (ref 1.6–5.5)
NEUTROPHILS NFR BLD AUTO: 67.9 %
NRBC BLD-RTO: 0 /100 WBCS (ref 0–0)
PLATELET # BLD AUTO: 153 X10*3/UL (ref 150–450)
POTASSIUM SERPL-SCNC: 4.2 MMOL/L (ref 3.5–5.3)
RBC # BLD AUTO: 4.28 X10*6/UL (ref 4–5.2)
RIGHT VENTRICLE FREE WALL PEAK S': 19 CM/S
RIGHT VENTRICLE PEAK SYSTOLIC PRESSURE: 23.3 MMHG
SODIUM SERPL-SCNC: 140 MMOL/L (ref 136–145)
TRICUSPID ANNULAR PLANE SYSTOLIC EXCURSION: 2 CM
WBC # BLD AUTO: 7.1 X10*3/UL (ref 4.4–11.3)

## 2024-06-18 PROCEDURE — 80048 BASIC METABOLIC PNL TOTAL CA: CPT | Performed by: NURSE PRACTITIONER

## 2024-06-18 PROCEDURE — 93010 ELECTROCARDIOGRAM REPORT: CPT | Performed by: STUDENT IN AN ORGANIZED HEALTH CARE EDUCATION/TRAINING PROGRAM

## 2024-06-18 PROCEDURE — 93325 DOPPLER ECHO COLOR FLOW MAPG: CPT

## 2024-06-18 PROCEDURE — 83735 ASSAY OF MAGNESIUM: CPT | Performed by: NURSE PRACTITIONER

## 2024-06-18 PROCEDURE — 99239 HOSP IP/OBS DSCHRG MGMT >30: CPT | Performed by: NURSE PRACTITIONER

## 2024-06-18 PROCEDURE — 93321 DOPPLER ECHO F-UP/LMTD STD: CPT | Performed by: INTERNAL MEDICINE

## 2024-06-18 PROCEDURE — 71045 X-RAY EXAM CHEST 1 VIEW: CPT | Performed by: RADIOLOGY

## 2024-06-18 PROCEDURE — 82947 ASSAY GLUCOSE BLOOD QUANT: CPT

## 2024-06-18 PROCEDURE — 85025 COMPLETE CBC W/AUTO DIFF WBC: CPT | Performed by: NURSE PRACTITIONER

## 2024-06-18 PROCEDURE — 93308 TTE F-UP OR LMTD: CPT | Performed by: INTERNAL MEDICINE

## 2024-06-18 PROCEDURE — 93325 DOPPLER ECHO COLOR FLOW MAPG: CPT | Performed by: INTERNAL MEDICINE

## 2024-06-18 PROCEDURE — 71045 X-RAY EXAM CHEST 1 VIEW: CPT

## 2024-06-18 PROCEDURE — 2500000001 HC RX 250 WO HCPCS SELF ADMINISTERED DRUGS (ALT 637 FOR MEDICARE OP): Performed by: NURSE PRACTITIONER

## 2024-06-18 PROCEDURE — 93005 ELECTROCARDIOGRAM TRACING: CPT

## 2024-06-18 RX ORDER — ACETAMINOPHEN 325 MG/1
650 TABLET ORAL EVERY 6 HOURS PRN
COMMUNITY
Start: 2024-06-18

## 2024-06-18 ASSESSMENT — COGNITIVE AND FUNCTIONAL STATUS - GENERAL
MOBILITY SCORE: 24
DAILY ACTIVITIY SCORE: 24

## 2024-06-18 ASSESSMENT — PAIN - FUNCTIONAL ASSESSMENT: PAIN_FUNCTIONAL_ASSESSMENT: 0-10

## 2024-06-18 ASSESSMENT — PAIN SCALES - GENERAL: PAINLEVEL_OUTOF10: 0 - NO PAIN

## 2024-06-18 NOTE — CARE PLAN
The patient's goals for the shift include      The clinical goals for the shift include HDS    Over the shift, the patient did  make progress toward the following goals. Barriers to progression include none. Recommendations to address these barriers include  n/a.     IV and tele removed . Reviewed all meds, appts, restrictions and instructions. Pt has understanding of all : Ready to be discharged FLORA Guzman RN

## 2024-06-18 NOTE — DISCHARGE SUMMARY
STRUCTURAL HEART INPATIENT DISCHARGE SUMMARY    BRIEF OVERVIEW  Admitting Provider: Oli Lazaro MD  Discharge Provider: Oli Lazaro MD  Primary Care Physician at Discharge: Melanie MuñozDO [phone]     Admission Date: 6/17/2024     Discharge Date: 6/18/2024    Primary Discharge Diagnosis  Aortic valve stenosis    Discharge Disposition  Final discharge disposition not confirmed  Code Status at Discharge: full    Active Issues Requiring Follow-up  Typical TAVR follow-up    Outpatient Follow-Up  Future Appointments   Date Time Provider Department Center   6/26/2024  2:30 PM PHARMACY [facility] NON-APC RESOURCE [id number] [facility]   7/2/2024  9:40 AM Melanie Muñoz DO [id number] [place]   7/17/2024 10:30 AM PHARMACY WE[facility] CARDIO RESOURCE [id number] [facility]   7/19/2024  2:30 PM SH JAIME [id number] CARD1 [id number] [facility]   8/23/2024 11:30 AM Cruz Fang MD [id number] [place]   9/5/2024  1:20 PM Mellissa Webster MD [id number] [place]   11/12/2024  1:30 PM Melanie Muñoz DO [id number] [place]   12/10/2024  8:40 AM Alethea Fisher DO [id number] [place]   6/19/2025 11:00 AM SH JAIME [id number] [id number] [facility]       Results for orders placed or performed during the hospital encounter of 06/17/24 (from the past 96 hour(s))   ECG 12 lead   Result Value Ref Range    Ventricular Rate 71 BPM    Atrial Rate 71 BPM    WA Interval 160 ms    QRS Duration 112 ms    QT Interval 398 ms    QTC Calculation(Bazett) 432 ms    P Axis 10 degrees    R Axis -45 degrees    T Axis 52 degrees    QRS Count 12 beats    Q Onset 211 ms    P Onset 131 ms    P Offset 193 ms    T Offset 410 ms    QTC Fredericia 421 ms   Type And Screen   Result Value Ref Range    ABO TYPE O     Rh TYPE POS     ANTIBODY SCREEN NEG    ACTIVATED CLOTTING TIME LOW   Result Value Ref Range    POCT Activated Clotting Time Low Range 306 (H) 83 - 199 sec   Transthoracic Echo (TTE) Limited   Result Value Ref Range    AV pk nico 3.19 m/s    AV mn grad 19.0 mmHg     LVOT diam 1.90 cm    LV Biplane EF 49 %    MV E/A ratio 0.79     LA vol index A/L 34.8 ml/m2    Tricuspid annular plane systolic excursion 2.5 cm    RVSP 35.9 mmHg    Aortic Valve Area by Continuity of VTI 0.71 cm2    Aortic Valve Area by Continuity of Peak Velocity 0.74 cm2    AV pk grad 40.7 mmHg    LV A4C EF 50.6    POCT GLUCOSE   Result Value Ref Range    POCT Glucose 137 (H) 74 - 99 mg/dL   Basic metabolic panel   Result Value Ref Range    Glucose 148 (H) 74 - 99 mg/dL    Sodium 139 136 - 145 mmol/L    Potassium 4.2 3.5 - 5.3 mmol/L    Chloride 105 98 - 107 mmol/L    Bicarbonate 25 21 - 32 mmol/L    Anion Gap 13 10 - 20 mmol/L    Urea Nitrogen 16 6 - 23 mg/dL    Creatinine 0.78 0.50 - 1.05 mg/dL    eGFR 77 >60 mL/min/1.73m*2    Calcium 8.6 8.6 - 10.6 mg/dL   Magnesium   Result Value Ref Range    Magnesium 2.04 1.60 - 2.40 mg/dL   CBC and Auto Differential   Result Value Ref Range    WBC 8.4 4.4 - 11.3 x10*3/uL    nRBC 0.0 0.0 - 0.0 /100 WBCs    RBC 3.96 (L) 4.00 - 5.20 x10*6/uL    Hemoglobin 12.4 12.0 - 16.0 g/dL    Hematocrit 38.8 36.0 - 46.0 %    MCV 98 80 - 100 fL    MCH 31.3 26.0 - 34.0 pg    MCHC 32.0 32.0 - 36.0 g/dL    RDW 13.5 11.5 - 14.5 %    Platelets 156 150 - 450 x10*3/uL    Neutrophils % 79.8 40.0 - 80.0 %    Immature Granulocytes %, Automated 1.1 (H) 0.0 - 0.9 %    Lymphocytes % 11.0 13.0 - 44.0 %    Monocytes % 6.4 2.0 - 10.0 %    Eosinophils % 1.3 0.0 - 6.0 %    Basophils % 0.4 0.0 - 2.0 %    Neutrophils Absolute 6.73 (H) 1.60 - 5.50 x10*3/uL    Immature Granulocytes Absolute, Automated 0.09 0.00 - 0.50 x10*3/uL    Lymphocytes Absolute 0.93 0.80 - 3.00 x10*3/uL    Monocytes Absolute 0.54 0.05 - 0.80 x10*3/uL    Eosinophils Absolute 0.11 0.00 - 0.40 x10*3/uL    Basophils Absolute 0.03 0.00 - 0.10 x10*3/uL   POCT GLUCOSE   Result Value Ref Range    POCT Glucose 213 (H) 74 - 99 mg/dL   POCT GLUCOSE   Result Value Ref Range    POCT Glucose 160 (H) 74 - 99 mg/dL   ECG 12 lead daily   Result Value Ref  Range    Ventricular Rate 74 BPM    Atrial Rate 74 BPM    FL Interval 176 ms    QRS Duration 108 ms    QT Interval 408 ms    QTC Calculation(Bazett) 452 ms    P Axis 13 degrees    R Axis -48 degrees    T Axis 25 degrees    QRS Count 13 beats    Q Onset 223 ms    P Onset 135 ms    P Offset 198 ms    T Offset 427 ms    QTC Fredericia 437 ms   CBC and Auto Differential   Result Value Ref Range    WBC 7.1 4.4 - 11.3 x10*3/uL    nRBC 0.0 0.0 - 0.0 /100 WBCs    RBC 4.28 4.00 - 5.20 x10*6/uL    Hemoglobin 13.4 12.0 - 16.0 g/dL    Hematocrit 41.5 36.0 - 46.0 %    MCV 97 80 - 100 fL    MCH 31.3 26.0 - 34.0 pg    MCHC 32.3 32.0 - 36.0 g/dL    RDW 13.6 11.5 - 14.5 %    Platelets 153 150 - 450 x10*3/uL    Neutrophils % 67.9 40.0 - 80.0 %    Immature Granulocytes %, Automated 0.4 0.0 - 0.9 %    Lymphocytes % 16.1 13.0 - 44.0 %    Monocytes % 12.6 2.0 - 10.0 %    Eosinophils % 2.4 0.0 - 6.0 %    Basophils % 0.6 0.0 - 2.0 %    Neutrophils Absolute 4.82 1.60 - 5.50 x10*3/uL    Immature Granulocytes Absolute, Automated 0.03 0.00 - 0.50 x10*3/uL    Lymphocytes Absolute 1.14 0.80 - 3.00 x10*3/uL    Monocytes Absolute 0.89 (H) 0.05 - 0.80 x10*3/uL    Eosinophils Absolute 0.17 0.00 - 0.40 x10*3/uL    Basophils Absolute 0.04 0.00 - 0.10 x10*3/uL   Basic Metabolic Panel   Result Value Ref Range    Glucose 140 (H) 74 - 99 mg/dL    Sodium 140 136 - 145 mmol/L    Potassium 4.2 3.5 - 5.3 mmol/L    Chloride 105 98 - 107 mmol/L    Bicarbonate 26 21 - 32 mmol/L    Anion Gap 13 10 - 20 mmol/L    Urea Nitrogen 18 6 - 23 mg/dL    Creatinine 0.83 0.50 - 1.05 mg/dL    eGFR 71 >60 mL/min/1.73m*2    Calcium 8.9 8.6 - 10.6 mg/dL   Magnesium   Result Value Ref Range    Magnesium 2.03 1.60 - 2.40 mg/dL   POCT GLUCOSE   Result Value Ref Range    POCT Glucose 190 (H) 74 - 99 mg/dL   Transthoracic Echo (TTE) Limited   Result Value Ref Range    BSA 1.76 m2       Test Results Pending at Discharge  Pending Labs       No current pending labs.                "  Your medication list        START taking these medications        Instructions Last Dose Given Next Dose Due   acetaminophen 325 mg tablet  Commonly known as: Tylenol      Take 2 tablets (650 mg) by mouth every 6 hours if needed for moderate pain (4 - 6), mild pain (1 - 3), headaches or fever (temp greater than 38.0 C).              CONTINUE taking these medications        Instructions Last Dose Given Next Dose Due   Accu-Chek Guide Glucose Meter misc  Generic drug: blood-glucose meter      Please check blood sugars 2 times daily       Accu-Chek Guide test strips strip  Generic drug: blood sugar diagnostic      Use 1 strip 2 times a day to test blood sugar.       Accu-Chek Softclix Lancets misc  Generic drug: lancets      Please check blood sugar 2 times daily       aspirin 81 mg EC tablet           biotin 10 mg tablet           empagliflozin 25 mg  Commonly known as: Jardiance      Take 1 tablet (25 mg) by mouth once daily.       Entresto 24-26 mg tablet  Generic drug: sacubitriL-valsartan      Take 1 tablet by mouth once daily.       famotidine 10 mg tablet  Commonly known as: Pepcid           insulin degludec 100 unit/mL (3 mL) injection  Commonly known as: Tresiba FlexTouch      Inject 8 Units under the skin once daily at bedtime. Take as directed per insulin instructions.       levothyroxine 125 mcg tablet  Commonly known as: Synthroid, Levoxyl      Take 1 tablet (125 mcg) by mouth once daily.       metoprolol succinate XL 25 mg 24 hr tablet  Commonly known as: Toprol-XL      Take 1 tablet (25 mg) by mouth once daily. Do not crush or chew.       predniSONE 5 mg tablet  Commonly known as: Deltasone      Take 1 tablet (5 mg) by mouth once daily.       predniSONE 1 mg tablet  Commonly known as: Deltasone      Take 2 tablets (2 mg) by mouth once daily.       TechLITE Pen Needle 31 gauge x 5/16\" needle  Generic drug: pen needle, diabetic      Use as directed to inject insulin once daily       traZODone 50 mg " tablet  Commonly known as: Desyrel                  STOP taking these medications      fluconazole 150 mg tablet  Commonly known as: Diflucan                  Where to Get Your Medications        You can get these medications from any pharmacy    You don't need a prescription for these medications  acetaminophen 325 mg tablet            DETAILS OF HOSPITAL STAY    Presenting Problem  Patient Active Problem List    Diagnosis Date Noted    Aortic valve stenosis, etiology of cardiac valve disease unspecified 06/17/2024    Heart failure with mid-range ejection fraction (Multi) 03/21/2024    Nonrheumatic aortic valve insufficiency 03/21/2024    Palpitations 03/21/2024    Primary hypertension 03/21/2024    Diabetes mellitus, type II, insulin dependent (Multi) 03/21/2024    Other specified peripheral vascular diseases (CMS-[facility]) 02/09/2024    Hyperglycemia 12/27/2023    Aortic valve stenosis 11/27/2023    Chronic systolic heart failure (Multi) 11/27/2023    LAYTON (dyspnea on exertion) 09/28/2023    Precordial pain 09/28/2023    Osteoarthritis of left knee 08/21/2023    Allergic rhinitis 04/06/2023    Anxiety 04/06/2023    CKD (chronic kidney disease) stage 3, GFR 30-59 ml/min (Multi) 04/06/2023    External hemorrhoids 04/06/2023    GERD (gastroesophageal reflux disease) 04/06/2023    Hair loss 04/06/2023    Heart palpitations 04/06/2023    Hip pain 04/06/2023    Hypothyroidism 04/06/2023    Insomnia 04/06/2023    Grief at loss of child 04/06/2023    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    Lumbar radiculopathy 04/06/2023    Nasal polyp 04/06/2023    Nasal turbinate hypertrophy 04/06/2023    Neck pain 04/06/2023    Osteoarthritis, localized, knee 04/06/2023    Osteoarthritis 04/06/2023    Overactive bladder 04/06/2023    Pars defect of lumbar spine 04/06/2023    Vitamin D deficiency 04/06/2023        LOS: 1 day     Objective     Vital signs in last 24 hours:  Temp:  [35.3 °C (95.5 °F)-36.3 °C (97.3 °F)] 36.3 °C (97.3 °F)  Heart Rate:   [56-76] 76  Resp:  [12-23] 12  BP: (120-152)/(59-87) 142/66    Physical Exam at Discharge  Discharge Condition: good  Heart Rate: 76  Resp: 12  BP: 142/66  Temp: 36.3 °C (97.3 °F)  Weight: 70.5 kg (155 lb 6.8 oz)    Constitutional: Well developed, awake/alert/oriented x3, no distress, cooperative  Eyes: PERRL, EOMI, clear sclera  ENMT: mucous membranes moist  Head/Neck: Neck supple, No JVD  Respiratory/Thorax: Good chest expansion, thorax symmetric, lung sounds clear  Cardiovascular: Regular rate and rhythm, no murmurs, normal S1 and S2  Gastrointestinal: Nondistended, soft, non-tender, no rebound tenderness or guarding, no masses palpable, no organomegaly, +BS  Extremities: trace bipedal edema, no cyanosis, bilat groins c/d/i with dsd no s/s of hematoma  Neurological: alert and oriented x3, intact senses, motor, response and reflexes, normal strength  Psychological: Appropriate mood and behavior   Skin: Warm and dry, intact.       History of Present Illness  Kristin Charles is a 78 y.o. female with a PMH of HFrEF, LVEF 35-40% ( TTE 2/9/2024), Hypertension, Hyperlipidemia, Hypothyroidism, Bilateral total knee arthroplasties, Polymyalgia rheumatica on prednisone, and severe non-rheumatic aortic stenosis.  Pt presented on 6/17/24 for elective TAVR.    Hospital Course     Kristin Charles is now s/p TAVR Scottie 3 26mm via B/L femoral artery (Left primary) on 6/17/24 with Dr. Lazaro and Dr. Aranda.  Pt developed a new LBBB post procedure, temp wire mainatained.  Final procedure ECHO showed a mean gradient of 4, no PVL, stable triv PC effusion.  Pt transferred to the step-down for monitoring.    Pre EKG - SR 71 with iRBBB and LAFB (, )  Post EKG - SB 56 with iRBBB (, )    POD 1 - No issues overnight.  Vitals, labs, neuro assessment, and groins remained stable.  Pt denies CP, SOB, abd/groin pain, numbness or tingling of BLE.  Stable chronic HFmrEF as evidence by no significant FVO on  assessment with trace bipedal edema, mostly clear lungs, sats stable on RA, CXR showed no acute process.  POD 1 EKG - SR 74 with LAFB (, ).  No events observed on Tele:  TVP removed and later RIJ cordis removed without incident.  POD 1 ECHO - EF 45-50%, no significant AI/PVL, gradients 12/6, no significant PC effusion.   Pt discharged home on POD 1 after ambulating around the Step-down.     Plan:  - D/C home today 6/18/2024  - Cont ASA (for life)  - Continue home Metoprolol dose (starting today 6/18/24)  - Continue home doses of Jardiance and Entresto  - Continue Prednisone and Tresiba taper as prior to admission to hospital  - Continue home medications otherwise  - follow up with Structural Heart clinic in one week, 1 month, and 1 year  - f/w Dr. Fang (Primary Cards) as scheduled  - f/w Melanie Muñoz,  in 1-2 weeks  - pt given Lab recs (1 week) and ECHO rec (1 month)       D/w Dr. Iveth OWUSU spent 45 minutes in the professional and overall care of this patient.     Travis Christensen MSN, AGACNP-BC  Acute Care Nurse Practitioner  Structural Heart / TAVR Team  Structural Pager: [phone]  (Nights) [facility] [place] fellow pager: [phone]

## 2024-06-18 NOTE — DOCUMENTATION CLARIFICATION NOTE
"    PATIENT:               CHEL MCKEON  ACCT #:                  [id number]  MRN:                       [id number]  :                       1945  ADMIT DATE:       2024 6:41 AM  DISCH DATE:        2024 12:20 PM  RESPONDING PROVIDER #:        [id number]          PROVIDER RESPONSE TEXT:    Chronic diastolic and systolic heart failure    CDI QUERY TEXT:    Clarification        Instruction:    Based on your assessment of the patient and the clinical information, please provide the requested documentation by clicking on the appropriate radio button and enter any additional information if prompted.    Question: Please clarify the diagnosis of Congestive Heart Failure      When answering this query, please exercise your independent professional judgment. The fact that a question is being asked, does not imply that any particular answer is desired or expected.    The patient's clinical indicators include:  Clinical Information: Progress notes indicate pt is an 78 yr old with severe nonrheumatic aortic stenosis presenting for TAVR    Documented Diagnosis: DC Summary suggests pt has acute on chronic diastolic heart failure  \"Very mild FVO on assessment... consistent with Acute/chronic diastolic heart failure.  Pt given IV lasix times one with good response.\"    Clinical Indicators and Documentation:  -Vital Signs,  0825: T 36.3- HR 76- RR 12, /66, SPO2 97 RA  -ECHO, : EF 45-50 pct., Echo  pending  -CXR: Per DCS, \"CXR showed mild pulm congestion\"  -BNP: n/a  -Physical exam findings:  -Lung Sounds: Per DC Summary, \"mostly clear lungs with diminished bases with faint fine bibasilar crackles\"  -JVD: n/a  -Peripheral Edema: Per DC Summary, \"trace BLE edema\"  -Supplemental Oxygen: Per DC Summary, \"sats stable on RA\"  -Cardiac Consult: n/a  -Documentation per Cardiac Cath Procedure Report, , shows \"Indications: TAVR in a high surgical risk patient with chronic diastolic and systolic heart failure. " "Severe aortic stenosis.\"    Treatment: TAVR on 6/17/24, Monitoring I/Os, Lasix IV times one, CXR    Risk Factors: Heart Failure, HTN  Options provided:  -- Chronic diastolic and systolic heart failure  -- Acute on chronic diastolic and systolic heart failure as evidenced by, Please specify additional information below  -- Other - I will add my own diagnosis  -- Refer to Clinical Documentation Reviewer    Query created by: Shital Valle on 6/18/2024 11:40 AM      Electronically signed by:  MAN POTTER MD 6/18/2024 1:54 PM          "

## 2024-06-18 NOTE — OP NOTE
Indications.  The patient  is an 78 y o lady who was recently diagnosed with severe symptomatic aortic stenosis and aortic regurgitation. Patient underwent complete work up, the results were presented and reviewed at the Structural Heart Selection meeting. Patient was thought to most benefit from JUNIOR. Risks, benefits and complications of this procedure were discussed with the patient, and informed consent was obtained.     Findings.   26 mm Edawards Scottie Ultra valve was deployed under rapid pacing. Post deployment TTE showed low tarns valvular gradient, no PVL, and no pericardial effusion.     Description.  Patient was brought to the hybrid OR and placed on a operating table in a supine position. After routine huddle, conscious sedation was established, patient was prepped and draped in a standard fashion. RIJ vein accessed, and a temporary pacing was advanced into the RV and tested. Both right and left femoral arteries were accessed routinely. Patient was heparinized. Primary right femoral artery access was protected with two Perclose devices. LV  was accessed with a straight wire, and after appropriate wire exchange, a 26 mm Edawards Scottie Ultra valve was then advanced into the aortic root and deployed routinely under rapid pacing at 180/min. TTE findings are described above. Protamine was given, all cannulas were removed,a nd cannulation sited secured. Patient tolerated this procedure well, and was taken out of the OR to the CICU in a stable condition.

## 2024-06-21 LAB
ATRIAL RATE: 74 BPM
P AXIS: 13 DEGREES
P OFFSET: 198 MS
P ONSET: 135 MS
PR INTERVAL: 176 MS
Q ONSET: 223 MS
QRS COUNT: 13 BEATS
QRS DURATION: 108 MS
QT INTERVAL: 408 MS
QTC CALCULATION(BAZETT): 452 MS
QTC FREDERICIA: 437 MS
R AXIS: -48 DEGREES
T AXIS: 25 DEGREES
T OFFSET: 427 MS
VENTRICULAR RATE: 74 BPM

## 2024-06-24 ENCOUNTER — LAB (OUTPATIENT)
Dept: LAB | Facility: LAB | Age: 79
End: 2024-06-24
Payer: MEDICARE

## 2024-06-24 DIAGNOSIS — I35.0 AORTIC VALVE STENOSIS, ETIOLOGY OF CARDIAC VALVE DISEASE UNSPECIFIED: ICD-10-CM

## 2024-06-24 DIAGNOSIS — Z95.2 S/P TAVR (TRANSCATHETER AORTIC VALVE REPLACEMENT): ICD-10-CM

## 2024-06-24 LAB
ANION GAP SERPL CALC-SCNC: 14 MMOL/L (ref 10–20)
BUN SERPL-MCNC: 20 MG/DL (ref 6–23)
CALCIUM SERPL-MCNC: 9.3 MG/DL (ref 8.6–10.6)
CHLORIDE SERPL-SCNC: 104 MMOL/L (ref 98–107)
CO2 SERPL-SCNC: 28 MMOL/L (ref 21–32)
CREAT SERPL-MCNC: 0.92 MG/DL (ref 0.5–1.05)
EGFRCR SERPLBLD CKD-EPI 2021: 63 ML/MIN/1.73M*2
ERYTHROCYTE [DISTWIDTH] IN BLOOD BY AUTOMATED COUNT: 13.2 % (ref 11.5–14.5)
GLUCOSE SERPL-MCNC: 179 MG/DL (ref 74–99)
HCT VFR BLD AUTO: 44 % (ref 36–46)
HGB BLD-MCNC: 13.7 G/DL (ref 12–16)
MCH RBC QN AUTO: 30.9 PG (ref 26–34)
MCHC RBC AUTO-ENTMCNC: 31.1 G/DL (ref 32–36)
MCV RBC AUTO: 99 FL (ref 80–100)
NRBC BLD-RTO: 0 /100 WBCS (ref 0–0)
PLATELET # BLD AUTO: 195 X10*3/UL (ref 150–450)
POTASSIUM SERPL-SCNC: 4.1 MMOL/L (ref 3.5–5.3)
RBC # BLD AUTO: 4.43 X10*6/UL (ref 4–5.2)
SODIUM SERPL-SCNC: 142 MMOL/L (ref 136–145)
WBC # BLD AUTO: 6.4 X10*3/UL (ref 4.4–11.3)

## 2024-06-24 PROCEDURE — 85027 COMPLETE CBC AUTOMATED: CPT

## 2024-06-24 PROCEDURE — 80048 BASIC METABOLIC PNL TOTAL CA: CPT

## 2024-06-24 PROCEDURE — 36415 COLL VENOUS BLD VENIPUNCTURE: CPT

## 2024-06-26 ENCOUNTER — APPOINTMENT (OUTPATIENT)
Dept: PHARMACY | Facility: HOSPITAL | Age: 79
End: 2024-06-26
Payer: MEDICARE

## 2024-06-26 DIAGNOSIS — E11.9 DIABETES MELLITUS, TYPE II, INSULIN DEPENDENT (MULTI): ICD-10-CM

## 2024-06-26 DIAGNOSIS — Z79.4 DIABETES MELLITUS, TYPE II, INSULIN DEPENDENT (MULTI): ICD-10-CM

## 2024-06-26 DIAGNOSIS — E11.9 DIABETES MELLITUS TYPE II, NON INSULIN DEPENDENT (MULTI): Primary | ICD-10-CM

## 2024-06-26 ASSESSMENT — ENCOUNTER SYMPTOMS: DIABETIC ASSOCIATED SYMPTOMS: 0

## 2024-06-26 NOTE — ASSESSMENT & PLAN NOTE
Continue all medications as prescribed  Increase in glucose levels likely due to recovery from recent surgery. Will likely come back down once pt heals from procedure process.   Educate on hypo- and hyperglycemia   FUV in 4 weeks.

## 2024-06-26 NOTE — PROGRESS NOTES
"[facility] Pharmacy Consult  Kristin Charles \"Edel\" is a 78 y.o. female was referred to Clinical Pharmacy Team for a Pharmacy consult.  The patient was referred for their Diabetes.    Referring Provider: Melanie Muñoz DO    Subjective   Allergies   Allergen Reactions    Other Other    Sulfa (Sulfonamide Antibiotics) Unknown    Miconazole Rash    Neomycin-Bacitracin-Polymyxin Rash       [facility] - [place], [place] - [address] [facility]  [address] [facility] [address]  Phone: [phone] Fax: [phone]    [facility]  [address], [address]  [facility] [address]  Phone: [phone] Fax: [phone]      Diabetes  She presents for her follow-up diabetic visit. She has type 2 diabetes mellitus. Her disease course has been stable. There are no hypoglycemic associated symptoms. There are no diabetic associated symptoms. There are no hypoglycemic complications. Symptoms are stable. There are no diabetic complications. She is compliant with treatment all of the time. Her home blood glucose trend is increasing steadily.     Sugar went up. Had a recent procedure and sugar gary up to 140-150s. Prednisone dose down to 8 mg now.    Review of Systems    Objective     There were no vitals taken for this visit.     LAB  Lab Results   Component Value Date    BILITOT 0.6 05/31/2024    CALCIUM 9.3 06/24/2024    CO2 28 06/24/2024     06/24/2024    CREATININE 0.92 06/24/2024    GLUCOSE 179 (H) 06/24/2024    ALKPHOS 37 05/31/2024    K 4.1 06/24/2024    PROT 5.8 (L) 05/31/2024     06/24/2024    AST 12 05/31/2024    ALT 12 05/31/2024    BUN 20 06/24/2024    ANIONGAP 14 06/24/2024    MG 2.03 06/18/2024    PHOS 3.3 10/07/2022    ALBUMIN 3.7 05/31/2024    GFRF CANCELED 05/27/2023    GFRMALE CANCELED 05/27/2023     Lab Results   Component Value Date    TRIG 136 11/15/2023    CHOL 171 11/15/2023    LDLCALC 90 11/15/2023    HDL 53.6 11/15/2023     Lab Results   Component Value Date    " "HGBA1C 11.6 (A) 05/07/2024       Current Outpatient Medications on File Prior to Visit   Medication Sig Dispense Refill    acetaminophen (Tylenol) 325 mg tablet Take 2 tablets (650 mg) by mouth every 6 hours if needed for moderate pain (4 - 6), mild pain (1 - 3), headaches or fever (temp greater than 38.0 C).      aspirin 81 mg EC tablet Take 1 tablet (81 mg) by mouth once daily.      biotin 10 mg tablet Take 1 tablet (10 mg) by mouth once daily.      blood sugar diagnostic (Blood Glucose Test) strip Use 1 strip 2 times a day to test blood sugar. 100 strip 1    blood-glucose meter misc Please check blood sugars 2 times daily 1 each 0    empagliflozin (Jardiance) 25 mg Take 1 tablet (25 mg) by mouth once daily. 90 tablet 3    famotidine (Pepcid) 10 mg tablet Take 1 tablet (10 mg) by mouth once daily as needed for heartburn.      insulin degludec (Tresiba FlexTouch) 100 unit/mL (3 mL) injection Inject 8 Units under the skin once daily at bedtime. Take as directed per insulin instructions. 15 mL 3    lancets misc Please check blood sugar 2 times daily 100 each 1    levothyroxine (Synthroid, Levoxyl) 125 mcg tablet Take 1 tablet (125 mcg) by mouth once daily. 90 tablet 1    metoprolol succinate XL (Toprol-XL) 25 mg 24 hr tablet Take 1 tablet (25 mg) by mouth once daily. Do not crush or chew. 90 tablet 3    pen needle, diabetic 31 gauge x 5/16\" needle Use as directed to inject insulin once daily 100 each 11    predniSONE (Deltasone) 1 mg tablet Take 2 tablets (2 mg) by mouth once daily. 180 tablet 0    predniSONE (Deltasone) 5 mg tablet Take 1 tablet (5 mg) by mouth once daily. 90 tablet 0    sacubitriL-valsartan (Entresto) 24-26 mg tablet Take 1 tablet by mouth once daily. 90 tablet 3    traZODone (Desyrel) 50 mg tablet Take 1 tablet (50 mg) by mouth as needed at bedtime.       No current facility-administered medications on file prior to visit.        HISTORICAL PHARMACOTHERAPY  -none    DRUG INTERACTIONS  - " none    Assessment/Plan   Problem List Items Addressed This Visit       Diabetes mellitus, type II, insulin dependent (Multi)     Continue all medications as prescribed  Increase in glucose levels likely due to recovery from recent surgery. Will likely come back down once pt heals from procedure process.   Educate on hypo- and hyperglycemia   FUV in 4 weeks.          Other Visit Diagnoses       Diabetes mellitus type II, non insulin dependent (Multi)    -  Primary             Continue all meds under the continuation of care with the referring provider and clinical pharmacy team.    Luis Pelaez PharmD     Verbal consent to manage patient's drug therapy was obtained from [the patient and/or an individual authorized to act on behalf of a patient]. They were informed they may decline to participate or withdraw from participation in pharmacy services at any time.

## 2024-06-27 ENCOUNTER — TELEPHONE (OUTPATIENT)
Dept: PRIMARY CARE | Facility: CLINIC | Age: 79
End: 2024-06-27
Payer: MEDICARE

## 2024-06-27 ENCOUNTER — TELEPHONE (OUTPATIENT)
Dept: RHEUMATOLOGY | Facility: CLINIC | Age: 79
End: 2024-06-27
Payer: MEDICARE

## 2024-06-27 ENCOUNTER — PATIENT MESSAGE (OUTPATIENT)
Dept: PRIMARY CARE | Facility: CLINIC | Age: 79
End: 2024-06-27
Payer: MEDICARE

## 2024-06-27 DIAGNOSIS — B37.9 YEAST INFECTION: Primary | ICD-10-CM

## 2024-06-27 RX ORDER — FLUCONAZOLE 150 MG/1
150 TABLET ORAL ONCE
Qty: 1 TABLET | Refills: 0 | Status: SHIPPED | OUTPATIENT
Start: 2024-06-27 | End: 2024-06-27

## 2024-06-27 NOTE — TELEPHONE ENCOUNTER
Patient said that she has a yeast infection from her Jardiance and would like diflucan sent over to [facility] in [place].

## 2024-06-27 NOTE — TELEPHONE ENCOUNTER
----- Message from Liberty Bobby MA sent at 6/27/2024 10:51 AM EDT -----  Regarding: FW: Prednisone dose  Spoke with patient.  She understands instructions.  ----- Message -----  From: Mellissa Webster MD  Sent: 6/27/2024  10:19 AM EDT  To: Liberty Bobby MA  Subject: RE: Prednisone dose                              Her PMR may be flaring up due to the stress of surgery. She can go up to 10 mg  and see if that helps, She can reattempt to taper down after 1-2 weeks to 9 mg for 2 weeks then 8 mg  ----- Message -----  From: Liberty Bobby MA  Sent: 6/27/2024   9:15 AM EDT  To: Mellissa Webster MD  Subject: Prednisone dose                                  Hi Dr. Webster:  Patient states that she had TAVR procedure 2 weeks ago and is recovering, but starting to have increased bilateral shoulder pain again.  She is currently taking 8mg Prednisone daily, but wants to know if she should increase the dose again.   Please advise.  Thanks,  Mary Alice

## 2024-06-28 NOTE — TELEPHONE ENCOUNTER
I did send her a script, if sx not improving in a  couple days, please schedule in office appointment for evaluation

## 2024-07-02 ENCOUNTER — APPOINTMENT (OUTPATIENT)
Dept: PRIMARY CARE | Facility: CLINIC | Age: 79
End: 2024-07-02
Payer: MEDICARE

## 2024-07-02 VITALS — WEIGHT: 155 LBS | BODY MASS INDEX: 28.35 KG/M2 | DIASTOLIC BLOOD PRESSURE: 74 MMHG | SYSTOLIC BLOOD PRESSURE: 126 MMHG

## 2024-07-02 DIAGNOSIS — E11.9 DIABETES MELLITUS, TYPE II, INSULIN DEPENDENT (MULTI): Primary | ICD-10-CM

## 2024-07-02 DIAGNOSIS — T38.0X5D STEROID-INDUCED DIABETES MELLITUS, SUBSEQUENT ENCOUNTER (MULTI): ICD-10-CM

## 2024-07-02 DIAGNOSIS — E09.9 STEROID-INDUCED DIABETES MELLITUS, SUBSEQUENT ENCOUNTER (MULTI): ICD-10-CM

## 2024-07-02 DIAGNOSIS — Z79.4 DIABETES MELLITUS, TYPE II, INSULIN DEPENDENT (MULTI): Primary | ICD-10-CM

## 2024-07-02 DIAGNOSIS — E11.9 DIABETES MELLITUS TYPE II, NON INSULIN DEPENDENT (MULTI): ICD-10-CM

## 2024-07-02 PROCEDURE — 1036F TOBACCO NON-USER: CPT | Performed by: FAMILY MEDICINE

## 2024-07-02 PROCEDURE — 3078F DIAST BP <80 MM HG: CPT | Performed by: FAMILY MEDICINE

## 2024-07-02 PROCEDURE — 99213 OFFICE O/P EST LOW 20 MIN: CPT | Performed by: FAMILY MEDICINE

## 2024-07-02 PROCEDURE — 1111F DSCHRG MED/CURRENT MED MERGE: CPT | Performed by: FAMILY MEDICINE

## 2024-07-02 PROCEDURE — 3074F SYST BP LT 130 MM HG: CPT | Performed by: FAMILY MEDICINE

## 2024-07-02 PROCEDURE — 1123F ACP DISCUSS/DSCN MKR DOCD: CPT | Performed by: FAMILY MEDICINE

## 2024-07-02 RX ORDER — INSULIN DEGLUDEC 100 U/ML
7 INJECTION, SOLUTION SUBCUTANEOUS NIGHTLY
Qty: 15 ML | Refills: 3 | Status: SHIPPED | OUTPATIENT
Start: 2024-07-02

## 2024-07-02 NOTE — PROGRESS NOTES
Chief complaint:   Chief Complaint   Patient presents with    Follow-up     1 month follow up diabetes       HPI:  Kristin Charles is a 79 y.o. female who presents for evaluation of her steroid induced diabetes.     Next weeks she will taper down to 7 mg from 10 mg of her Prednisone.     Glucose is 90-110s at home now  She has had 2 yeast infections now with the Jardiance 2 weeks apart. Treatment was successful both times. This last episode coincided with the temporary increase in the Prednisone.     Physical exam:  /74   Wt 70.3 kg (155 lb)   BMI 28.35 kg/m²   General: NAD, well appearing female  Heart: RRR, no mumur appreciated  Lungs: CTAB, no wheezes, rales, rhonchi    Assessment/Plan   Problem List Items Addressed This Visit       Diabetes mellitus, type II, insulin dependent (Multi) - Primary    Relevant Medications    insulin degludec (Tresiba FlexTouch) 100 unit/mL (3 mL) injection     Other Visit Diagnoses       Steroid-induced diabetes mellitus, subsequent encounter (Multi)        Diabetes mellitus type II, non insulin dependent (Multi)        Relevant Medications    insulin degludec (Tresiba FlexTouch) 100 unit/mL (3 mL) injection        When she decreases her Prednisone to 7 mg, decrease the insulin from 8 units to 7 units. Call with new glucose readings. Next in office appointment in 1 mo, sooner as needed.    Melanie Muñoz, DO

## 2024-07-02 NOTE — PATIENT INSTRUCTIONS
When you decrease the Prednisone to 7mg, decrease the insulin from 8 units to 7 units at bedtime. Continue blood sugar monitoring daily.

## 2024-07-11 ENCOUNTER — PATIENT MESSAGE (OUTPATIENT)
Dept: PRIMARY CARE | Facility: CLINIC | Age: 79
End: 2024-07-11
Payer: MEDICARE

## 2024-07-11 PROCEDURE — RXMED WILLOW AMBULATORY MEDICATION CHARGE

## 2024-07-12 ENCOUNTER — PHARMACY VISIT (OUTPATIENT)
Dept: PHARMACY | Facility: CLINIC | Age: 79
End: 2024-07-12
Payer: COMMERCIAL

## 2024-07-12 ENCOUNTER — APPOINTMENT (OUTPATIENT)
Dept: PHARMACY | Facility: HOSPITAL | Age: 79
End: 2024-07-12
Payer: MEDICARE

## 2024-07-17 ENCOUNTER — APPOINTMENT (OUTPATIENT)
Dept: PHARMACY | Facility: HOSPITAL | Age: 79
End: 2024-07-17
Payer: MEDICARE

## 2024-07-17 DIAGNOSIS — I50.22 HEART FAILURE WITH MID-RANGE EJECTION FRACTION (MULTI): Primary | ICD-10-CM

## 2024-07-17 PROCEDURE — RXMED WILLOW AMBULATORY MEDICATION CHARGE

## 2024-07-17 NOTE — ASSESSMENT & PLAN NOTE
Entresto is best taking in a twice daily regimen. Patient has a pill cutter at home and will start taking a half tablet of 24-26mg twice daily.  Reports she has had issues of low blood pressure in the past.  Will not increase daily dose at this time due to lack of home blood pressure readings.  Has not recently had any symptoms of low blood pressure.  Due to increased frequency of yeast infections we will trial the decreased Jardiance 10mg dose daily to see if this will help prevent future yeast infections.

## 2024-07-17 NOTE — PROGRESS NOTES
I reviewed the progress note and agree with the resident’s findings and plans as written.     Shital Dunne, PharmD

## 2024-07-17 NOTE — Clinical Note
Luis A Fang.  1. Entresto is best taking in a twice daily regimen. Patient has a pill cutter at home and will start taking a half tablet of 24-26mg twice daily. a. Reports she has had issues of low blood pressure in the past. b. Will not increase daily dose at this time due to lack of home blood pressure readings. c. Has not recently had any symptoms of low blood pressure. 2. Due to increased frequency of yeast infections we will trial the decreased Jardiance 10mg dose daily to see if this will help prevent future yeast infections.

## 2024-07-17 NOTE — PROGRESS NOTES
"  Pharmacist Clinic: Cardiology Management    Kristin Charles \"Edel\" is a 79 y.o. female was referred to Clinical Pharmacy Team for Heart Failure management.     Referring Provider: Cruz Fang MD    THIS IS A FOLLOW UP PATIENT APPOINTMENT. AT LAST VISIT ON 4/12/24 WITH PHARMACIST (Shital Dunne).    Appointment was completed by Edel who was reached at primary number.    REVIEW OF PAST APPNT (IF APPLICABLE):   Patient was recently prescribed Entresto and was not able to afford the medication. She is now receiving Entresto and Jardiance through [facility] with a renewal date of 4/15/24  Noted reduced EF in 11/2023.  Did not have any noted side effects to current medication management.    Allergies   Allergen Reactions    Other Other    Sulfa (Sulfonamide Antibiotics) Unknown    Miconazole Rash    Neomycin-Bacitracin-Polymyxin Rash       Past Medical History:   Diagnosis Date    Actinic keratosis     Acute candidiasis of vulva and vagina     Yeast infection of the vagina    Acute laryngitis 03/20/2018    Acute laryngitis    Lee's cyst of knee 04/06/2023    Bitten or stung by nonvenomous insect and other nonvenomous arthropods, initial encounter 09/02/2015    Bug bite with infection    Candidiasis, unspecified 10/13/2017    Yeast infection    Chronic ethmoidal sinusitis 11/24/2020    Chronic ethmoidal sinusitis    Chronic maxillary sinusitis 08/06/2020    Chronic maxillary sinusitis    Disease of thyroid gland     Eczema     Heart murmur     Heart valve disease     Laceration without foreign body of unspecified forearm, initial encounter 01/09/2022    Forearm laceration    Lumbago with sciatica, left side 03/29/2019    Chronic bilateral low back pain with left-sided sciatica    Lymphocytic colitis 05/05/2023    Colonoscopy: 4/24/2023    Nail disorder, unspecified 08/08/2023    Neoplasm of uncertain behavior of skin 08/08/2023    Obesity 01/19/2015    Onychomycosis 04/06/2023    Open wound of hip and thigh with " tendon involvement 05/24/2018    Overweight (BMI 25.0-29.9) 04/06/2023    Personal history of diseases of the skin and subcutaneous tissue 01/09/2022    History of contact dermatitis    Personal history of diseases of the skin and subcutaneous tissue 09/02/2015    History of cellulitis    Personal history of other diseases of the musculoskeletal system and connective tissue     Personal history of arthritis    Personal history of other diseases of the nervous system and sense organs     History of cataract    Personal history of other diseases of the nervous system and sense organs 09/20/2019    History of sciatica    Personal history of other endocrine, nutritional and metabolic disease     History of hypothyroidism    Personal history of other infectious and parasitic diseases 09/23/2017    History of erysipelas    Personal history of other specified conditions 03/06/2020    History of epistaxis    Rash and other nonspecific skin eruption 06/15/2015    Skin rash    Rhinitis 10 years    Sciatica 12/11/2014    Status post joint replacement 06/19/2023    Strain of muscle, fascia and tendon of the posterior muscle group at thigh level, unspecified thigh, initial encounter 05/01/2018    Hamstring strain    Tinnitus 10 years    Vaginal discharge 04/06/2023    Varus deformity, not elsewhere classified, unspecified knee 03/06/2019    Acquired genu varum       Current Outpatient Medications on File Prior to Visit   Medication Sig Dispense Refill    acetaminophen (Tylenol) 325 mg tablet Take 2 tablets (650 mg) by mouth every 6 hours if needed for moderate pain (4 - 6), mild pain (1 - 3), headaches or fever (temp greater than 38.0 C).      aspirin 81 mg EC tablet Take 1 tablet (81 mg) by mouth once daily.      biotin 10 mg tablet Take 1 tablet (10 mg) by mouth once daily.      blood sugar diagnostic (Blood Glucose Test) strip Use 1 strip 2 times a day to test blood sugar. 100 strip 1    blood-glucose meter misc Please check  "blood sugars 2 times daily 1 each 0    empagliflozin (Jardiance) 25 mg Take 1 tablet (25 mg) by mouth once daily. 90 tablet 3    famotidine (Pepcid) 10 mg tablet Take 1 tablet (10 mg) by mouth once daily as needed for heartburn.      insulin degludec (Tresiba FlexTouch) 100 unit/mL (3 mL) injection Inject 7 Units under the skin once daily at bedtime. Take as directed per insulin instructions. 15 mL 3    lancets misc Please check blood sugar 2 times daily 100 each 1    levothyroxine (Synthroid, Levoxyl) 125 mcg tablet Take 1 tablet (125 mcg) by mouth once daily. 90 tablet 1    metoprolol succinate XL (Toprol-XL) 25 mg 24 hr tablet Take 1 tablet (25 mg) by mouth once daily. Do not crush or chew. 90 tablet 3    pen needle, diabetic 31 gauge x 5/16\" needle Use as directed to inject insulin once daily 100 each 11    predniSONE (Deltasone) 1 mg tablet Take 2 tablets (2 mg) by mouth once daily. 180 tablet 0    predniSONE (Deltasone) 5 mg tablet Take 1 tablet (5 mg) by mouth once daily. (Patient taking differently: Take 2 tablets (10 mg) by mouth once daily.) 90 tablet 0    sacubitriL-valsartan (Entresto) 24-26 mg tablet Take 1 tablet by mouth once daily. (Patient taking differently: Take 0.5 tablets by mouth 2 times a day.) 90 tablet 3    traZODone (Desyrel) 50 mg tablet Take 1 tablet (50 mg) by mouth as needed at bedtime.       No current facility-administered medications on file prior to visit.         RELEVANT LAB RESULTS  Lab Results   Component Value Date    BILITOT 0.6 05/31/2024    CALCIUM 9.3 06/24/2024    CO2 28 06/24/2024     06/24/2024    CREATININE 0.92 06/24/2024    GLUCOSE 179 (H) 06/24/2024    ALKPHOS 37 05/31/2024    K 4.1 06/24/2024    PROT 5.8 (L) 05/31/2024     06/24/2024    AST 12 05/31/2024    ALT 12 05/31/2024    BUN 20 06/24/2024    ANIONGAP 14 06/24/2024    MG 2.03 06/18/2024    PHOS 3.3 10/07/2022    ALBUMIN 3.7 05/31/2024    GFRF CANCELED 05/27/2023    GFRMALE CANCELED 05/27/2023 " "    Lab Results   Component Value Date    TRIG 136 11/15/2023    CHOL 171 11/15/2023    LDLCALC 90 11/15/2023    HDL 53.6 11/15/2023     No results found for: \"BMCBC\", \"CBCDIF\"     PHARMACEUTICAL ASSESSMENT    Medication Documentation Review Audit       Reviewed by Arsenio Espinoza [facility] (Pharmacist) on 06/17/24 at 0731      Medication Order Taking? Sig Documenting Provider Last Dose Status   aspirin 81 mg EC tablet [id number] Yes Take 1 tablet (81 mg) by mouth once daily. Historical Provider, MD 6/17/2024 Active   biotin 10 mg tablet [id number] Yes Take 1 tablet (10 mg) by mouth once daily. Historical Provider, MD Past Week Active   blood sugar diagnostic (Blood Glucose Test) strip [id number]  Use 1 strip 2 times a day to test blood sugar. Melanie Muñoz DO  Active   blood-glucose meter misc [id number]  Please check blood sugars 2 times daily Melanie Muñoz DO  Active   empagliflozin (Jardiance) 25 mg [id number] Yes Take 1 tablet (25 mg) by mouth once daily. Cruz Fang MD 6/16/2024 Active   famotidine (Pepcid) 10 mg tablet [id number] No Take 1 tablet (10 mg) by mouth once daily as needed for heartburn. Historical Provider, MD Unknown Active   fluconazole (Diflucan) 150 mg tablet [id number]  Take 1 tablet (150 mg) by mouth 1 time for 1 dose. Melanie Muñoz DO  Active   insulin degludec (Tresiba FlexTouch) 100 unit/mL (3 mL) injection [id number] No Inject 8 Units under the skin once daily at bedtime. Take as directed per insulin instructions. Melanie Muñoz DO 6/15/2024 Active   lancets misc [id number]  Please check blood sugar 2 times daily Melanie Muñoz DO  Active   levothyroxine (Synthroid, Levoxyl) 125 mcg tablet [id number] Yes Take 1 tablet (125 mcg) by mouth once daily. Melanie Muñoz DO 6/17/2024 0200 Active   metoprolol succinate XL (Toprol-XL) 25 mg 24 hr tablet [id number] Yes Take 1 tablet (25 mg) by mouth once daily. Do not crush or chew. Cruz Fang MD 6/17/2024 Active   pen needle, " "diabetic 31 gauge x 5/16\" needle [id number]  Use as directed to inject insulin once daily Melanie Muñoz,   Active   predniSONE (Deltasone) 1 mg tablet [id number] Yes Take 2 tablets (2 mg) by mouth once daily. Mellissa Webster MD 6/17/2024 Active   predniSONE (Deltasone) 5 mg tablet [id number] Yes Take 1 tablet (5 mg) by mouth once daily. Mellissa Webster MD 6/17/2024 Active   sacubitriL-valsartan (Entresto) 24-26 mg tablet [id number] Yes Take 1 tablet by mouth once daily. Cruz Fang MD 6/16/2024 Active   traZODone (Desyrel) 50 mg tablet [id number] Yes Take 1 tablet (50 mg) by mouth as needed at bedtime. Historical Provider, MD 6/16/2024 Active                    DISEASE MANAGEMENT ASSESSMENT:     CHF ASSESSMENT     Symptom/Staging:  -Most recent ejection fraction: 45-50%  -NYHA Stage: II    Results for orders placed during the hospital encounter of 06/17/24    Transthoracic Echo (TTE) Limited    [facility], [address] [place] [address]  Tel [phone] and Fax [phone]    TRANSTHORACIC ECHOCARDIOGRAM REPORT      Patient Name:      CHEL WARREN LINDA Stearns Physician:    [id number] Berenice Moreno MD  Study Date:        6/18/2024            Ordering Provider:    [id number] MARSHA VILLAREAL  MRN/PID:           [id number]             Fellow:  Accession#:        [id number]         Nurse:  Date of Birth/Age: 1945 / 78 years Sonographer:          Evelyne OLVERA  Gender:            F                    Additional Staff:  Height:            157.48 cm            Admit Date:           6/6/2024  Weight:            70.31 kg             Admission Status:     Inpatient -  Routine  BSA / BMI:         1.72 m2 / 28.35      Encounter#:           [id number]  kg/m2  Department Location:  [place] [address]  Blood Pressure: 142 /66 mmHg    Study Type:    TRANSTHORACIC ECHO (TTE) LIMITED  Diagnosis/ICD: Presence of prosthetic heart valve-Z95.2  Indication:    s/p TAVR  CPT Code:      " Echo Limited-30077; Doppler Limited-77733; Color Doppler-94778    Patient History:  Pertinent History: CKD, GERD, palpitations, LAYTON, HTN, DM, AI, AS , s/p TAVR 26mm  Haile (6/17/24).    Study Detail: The following Echo studies were performed: 2D, M-Mode, Doppler and  color flow. Technically challenging study due to prominent lung  artifact.      PHYSICIAN INTERPRETATION:  Left Ventricle: The left ventricular systolic function is low normal, with an estimated ejection fraction of 50-55%. There are no regional wall motion abnormalities. The left ventricular cavity size is normal. Spectral Doppler shows an impaired relaxation pattern of left ventricular diastolic filling.  Left Atrium: The left atrium is normal in size.  Right Ventricle: The right ventricle is normal in size. There is normal right ventricular global systolic function.  Right Atrium: The right atrium is normal in size.  Aortic Valve: There is a prosthetic aortic valve present. There is an Haile transcatheter aortic valve replacement, with a 26 mm reported size. There is no evidence of aortic valve regurgitation. The peak instantaneous gradient of the aortic valve is 15.2 mmHg. The mean gradient of the aortic valve is 7.0 mmHg. S/p 26mm Haile Scottie TAVR with gradients of 15.2/7mmHg and no AI.  Mitral Valve: The mitral valve is normal in structure. There is trace mitral valve regurgitation.  Tricuspid Valve: The tricuspid valve is structurally normal. There is trace tricuspid regurgitation. The Doppler estimated RVSP is within normal limits at 23.2 mmHg.  Pulmonic Valve: The pulmonic valve is not well visualized. There is physiologic pulmonic valve regurgitation.  Pericardium: There is a trivial pericardial effusion.  Aorta: The aortic root is normal.  Systemic Veins: The inferior vena cava appears to be of normal size. There is IVC inspiratory collapse greater than 50%.  In comparison to the previous echocardiogram(s): Compared with study from  6/17/2024,. Compared with the prior exam from 6/17/2024 ( periprocedural TTE) the post TAVR gradients were 10.1/4mmHg wtih no AI.      CONCLUSIONS:  1. Left ventricular systolic function is low normal with a 50-55% estimated ejection fraction.  2. Spectral Doppler shows an impaired relaxation pattern of left ventricular diastolic filling.  3. RVSP within normal limits.  4. S/p 26mm Haile Scottie TAVR with gradients of 15.2/7mmHg and no AI.  5. There is a transcatheter aortic valve replacement.  6. Compared with the prior exam from 6/17/2024 ( periprocedural TTE) the post TAVR gradients were 10.1/4mmHg wtih no AI.    QUANTITATIVE DATA SUMMARY:  2D MEASUREMENTS:  Normal Ranges:  LAs:           4.00 cm   (2.7-4.0cm)  IVSd:          1.20 cm   (0.6-1.1cm)  LVPWd:         1.00 cm   (0.6-1.1cm)  LVIDd:         4.30 cm   (3.9-5.9cm)  LVIDs:         3.50 cm  LV Mass Index: 95.0 g/m2  LV % FS        18.6 %    LA VOLUME:  Normal Ranges:  LA Vol A4C:        54.0 ml    (22+/-6mL/m2)  LA Vol A2C:        54.0 ml  LA Vol BP:         54.0 ml  LA Vol Index A4C:  31.5ml/m2  LA Vol Index A2C:  31.5 ml/m2  LA Vol Index BP:   31.5 ml/m2  LA Area A4C:       18.0 cm2  LA Area A2C:       18.0 cm2  LA Major Axis A4C: 5.1 cm  LA Major Axis A2C: 5.1 cm  LA Volume Index:   32.0 ml/m2    RA VOLUME BY A/L METHOD:  Normal Ranges:  RA Vol A4C:        20.7 ml    (8.3-19.5ml)  RA Vol Index A4C:  12.1 ml/m2  RA Area A4C:       10.0 cm2  RA Major Axis A4C: 4.1 cm    M-MODE MEASUREMENTS:  Normal Ranges:  Ao Root: 3.20 cm (2.0-3.7cm)  LAs:     4.50 cm (2.7-4.0cm)    AORTA MEASUREMENTS:  Normal Ranges:  Ao Sinus, d: 2.80 cm (2.1-3.5cm)  Asc Ao, d:   3.40 cm (2.1-3.4cm)  Ao Arch:     2.93 cm (2.0-3.6cm)    LV SYSTOLIC FUNCTION BY 2D PLANIMETRY (MOD):  Normal Ranges:  EF-A4C View: 53.7 % (>=55%)  EF-A2C View: 55.0 %  EF-Biplane:  53.5 %    LV DIASTOLIC FUNCTION:  Normal Ranges:  MV Peak E:        0.71 m/s    (0.7-1.2 m/s)  MV Peak A:        1.25 m/s     For information on Fall & Injury Prevention, visit: [email or web address] OR  [email or web address] OR  https://www.cdc.gov/steadi/patient.html (0.42-0.7 m/s)  E/A Ratio:        0.57        (1.0-2.2)  MV e'             0.05 m/s    (>8.0)  MV lateral e'     0.06 m/s  MV medial e'      0.04 m/s  MV A Dur:         132.00 msec  E/e' Ratio:       14.14       (<8.0)  a'                0.08 m/s  PulmV Sys Elias:    53.10 cm/s  PulmV Vargas Elias:   32.60 cm/s  PulmV S/D Elias:    1.60  PulmV A Revs Elias: 36.00 cm/s  PulmV A Revs Dur: 100.00 msec    MITRAL VALVE:  Normal Ranges:  MV DT: 298 msec (150-240msec)    AORTIC VALVE:  Normal Ranges:  AoV Vmax:                1.95 m/s  (<=1.7m/s)  AoV Peak PG:             15.2 mmHg (<20mmHg)  AoV Mean P.0 mmHg  (1.7-11.5mmHg)  LVOT Max Elias:            1.03 m/s  (<=1.1m/s)  AoV VTI:                 34.10 cm  (18-25cm)  LVOT VTI:                18.00 cm  LVOT Diameter:           2.10 cm   (1.8-2.4cm)  AoV Area, VTI:           1.83 cm2  (2.5-5.5cm2)  AoV Area,Vmax:           1.83 cm2  (2.5-4.5cm2)  AoV Dimensionless Index: 0.53      RIGHT VENTRICLE:  RV Basal 3.08 cm  RV Mid   2.75 cm  RV Major 7.0 cm  TAPSE:   20.3 mm  RV s'    0.19 m/s    TRICUSPID VALVE/RVSP:  Normal Ranges:  Peak TR Velocity: 2.25 m/s  RV Syst Pressure: 23.2 mmHg (< 30mmHg)  IVC Diam:         1.70 cm    PULMONIC VALVE:  Normal Ranges:  PV Max Elias: 1.0 m/s  (0.6-0.9m/s)  PV Max P.4 mmHg    Pulmonary Veins:  PulmV A Revs Dur: 100.00 msec  PulmV A Revs Elias: 36.00 cm/s  PulmV Vargas Elias:   32.60 cm/s  PulmV S/D Elias:    1.60  PulmV Sys Elias:    53.10 cm/s      [id number] Berenice Moreno MD  Electronically signed on 2024 at 4:58:05 PM        ** Final **      Guideline-Directed Medical Therapy:  -ARNI: Yes, describe: Entresto 24-26mg daily  -Beta Blocker: Yes, describe: metoprolol succinate 25mg daily  -MRA: No  -SGLT2i: Yes, describe: Jardiance 25mg daily    Secondary Prevention:  -The ASCVD Risk score ([employer] DK, et al., 2019) failed to calculate for the following reasons:    The 2019 ASCVD risk score is only valid for ages 40 to 79   -Aspirin 81mg?  yes  -Statin?: No  -HTN?: Yes, describe: Controlled during past office visits.    CURRENT PHARMACOTHERAPY:   Entresto 24-26mg Daily  Jardiance 25mg daily  Metoprolol succinate 25mg daily  No dose adjustments needed at this time for this pateints kidney and hepatic function  eGFR 63ml/min  CrCl 53ml/min    Affordability: [facility]  Adherence/Compliance: reports adherence  Adverse Effects: none reported    Monitoring Weights at Home: Yes  Home Weight Recordings: 152lbs    Past In Office Weight Readings:   Wt Readings from Last 6 Encounters:   07/02/24 70.3 kg (155 lb)   06/18/24 70.5 kg (155 lb 6.8 oz)   06/11/24 70.3 kg (155 lb)   06/06/24 70.2 kg (154 lb 12.8 oz)   05/13/24 68 kg (150 lb)   05/13/24 68 kg (150 lb)       Monitoring Blood Pressure at Home: Yes  Home BP Recordings: patient is not sure how to use blood pressure cuff.    Past In Office BP Readings:   BP Readings from Last 6 Encounters:   07/02/24 126/74   06/18/24 142/66   06/11/24 116/60   06/06/24 114/68   05/21/24 130/50   05/13/24 111/59       HEALTH MANAGEMENT    Maintaining fluid restriction (<2 L/day): N/A  Edema/swelling: No  Shortness of breath: No  Trouble sleeping/lying down: No  Dry/hacking cough: No  Recent Hospitalizations: Yes, describe: 6/17  TAVR    EDUCATION/COUNSELING:   - Counseled patient on MOA, expectations, duration of therapy, contraindications, administration, and monitoring parameters  - Counseled patient on lifestyle modifications that can decrease your risk of having complications (smoking cessation, losing weight, daily weights, vaccines)  - Counseled patient on fluid intake and weight management. Recommended to not consume more than 2 liters of fliuids per day. If they have gained more than 2-3 pounds within a 24 hours period (or 5 pounds in a week), contact their cardiologist  - Answered all patient questions and concerns       DISCUSSION/NOTES:   Patient is doing well with current HF medication management. Patient is  currently taking Entresto 24-26mg one tablet daily. Patient reports she has a pill cutter and will start cutting the tablet in half to take a half tablet in the morning and a half in the evening. Entresto works best in twice daily regimens.  Patient reports she has had recurrent yeast infections since starting on Jardiance. She wishes to stay on the medication. Will decrease to see if this decreases the frequency of yeast infections.    ASSESSMENT AND PLAN:    Assessment/Plan   Problem List Items Addressed This Visit       Heart failure with mid-range ejection fraction (Multi) - Primary     Entresto is best taking in a twice daily regimen. Patient has a pill cutter at home and will start taking a half tablet of 24-26mg twice daily.  Reports she has had issues of low blood pressure in the past.  Will not increase daily dose at this time due to lack of home blood pressure readings.  Has not recently had any symptoms of low blood pressure.  Due to increased frequency of yeast infections we will trial the decreased Jardiance 10mg dose daily to see if this will help prevent future yeast infections.              RECOMMENDATIONS/PLAN    Change  Entresto 24-26mg take 1 half tablet twice daily  Decrease   Jardiance from 25mg to 10mg daily  CONTINUE  Metoprolol succinate 25mg daily    Next Cardiology Appointment: 8/23/24  Clinical Pharmacist follow up: 8/28/24  VAF/Application Expiration: Yes    Date: 4/15/25  Type of Encounter: Zaida Klein PharmD  PGY1 Resident     Verbal consent to manage patient's drug therapy was obtained from the patient . They were informed they may decline to participate or withdraw from participation in pharmacy services at any time.    Continue all meds under the continuation of care with the referring provider and clinical pharmacy team.

## 2024-07-18 ENCOUNTER — PATIENT MESSAGE (OUTPATIENT)
Dept: PRIMARY CARE | Facility: CLINIC | Age: 79
End: 2024-07-18
Payer: MEDICARE

## 2024-07-18 DIAGNOSIS — B37.9 YEAST INFECTION: Primary | ICD-10-CM

## 2024-07-19 ENCOUNTER — PHARMACY VISIT (OUTPATIENT)
Dept: PHARMACY | Facility: CLINIC | Age: 79
End: 2024-07-19
Payer: COMMERCIAL

## 2024-07-19 ENCOUNTER — TELEMEDICINE (OUTPATIENT)
Dept: CARDIOLOGY | Facility: HOSPITAL | Age: 79
End: 2024-07-19
Payer: MEDICARE

## 2024-07-19 DIAGNOSIS — Z95.2 S/P TAVR (TRANSCATHETER AORTIC VALVE REPLACEMENT): Primary | ICD-10-CM

## 2024-07-19 PROBLEM — I35.1 NONRHEUMATIC AORTIC VALVE INSUFFICIENCY: Status: RESOLVED | Noted: 2024-03-21 | Resolved: 2024-07-19

## 2024-07-19 PROCEDURE — 1036F TOBACCO NON-USER: CPT | Performed by: NURSE PRACTITIONER

## 2024-07-19 PROCEDURE — 1123F ACP DISCUSS/DSCN MKR DOCD: CPT | Performed by: NURSE PRACTITIONER

## 2024-07-19 PROCEDURE — 1159F MED LIST DOCD IN RCRD: CPT | Performed by: NURSE PRACTITIONER

## 2024-07-19 PROCEDURE — 99215 OFFICE O/P EST HI 40 MIN: CPT | Performed by: NURSE PRACTITIONER

## 2024-07-19 PROCEDURE — 1160F RVW MEDS BY RX/DR IN RCRD: CPT | Performed by: NURSE PRACTITIONER

## 2024-07-19 RX ORDER — AMOXICILLIN 500 MG/1
CAPSULE ORAL
Qty: 4 CAPSULE | Refills: 5 | Status: SHIPPED | OUTPATIENT
Start: 2024-07-19

## 2024-07-19 RX ORDER — FLUCONAZOLE 150 MG/1
150 TABLET ORAL ONCE
Qty: 1 TABLET | Refills: 0 | Status: SHIPPED | OUTPATIENT
Start: 2024-07-19 | End: 2024-07-19

## 2024-07-19 NOTE — PROGRESS NOTES
Structural Heart Follow up visit    Kristin Charles is a 79 y.o. female presents today for 1 month follow up s/p TAVR    denies SOB,LAYTON, and BLE edema  Confirms improving fatigue  Recent Hospitalizations  No    patient with   Past Medical History:   Diagnosis Date    Actinic keratosis     Acute candidiasis of vulva and vagina     Yeast infection of the vagina    Acute laryngitis 03/20/2018    Acute laryngitis    Lee's cyst of knee 04/06/2023    Bitten or stung by nonvenomous insect and other nonvenomous arthropods, initial encounter 09/02/2015    Bug bite with infection    Candidiasis, unspecified 10/13/2017    Yeast infection    Chronic ethmoidal sinusitis 11/24/2020    Chronic ethmoidal sinusitis    Chronic maxillary sinusitis 08/06/2020    Chronic maxillary sinusitis    Disease of thyroid gland     Eczema     Heart murmur     Heart valve disease     Laceration without foreign body of unspecified forearm, initial encounter 01/09/2022    Forearm laceration    Lumbago with sciatica, left side 03/29/2019    Chronic bilateral low back pain with left-sided sciatica    Lymphocytic colitis 05/05/2023    Colonoscopy: 4/24/2023    Nail disorder, unspecified 08/08/2023    Neoplasm of uncertain behavior of skin 08/08/2023    Obesity 01/19/2015    Onychomycosis 04/06/2023    Open wound of hip and thigh with tendon involvement 05/24/2018    Overweight (BMI 25.0-29.9) 04/06/2023    Personal history of diseases of the skin and subcutaneous tissue 01/09/2022    History of contact dermatitis    Personal history of diseases of the skin and subcutaneous tissue 09/02/2015    History of cellulitis    Personal history of other diseases of the musculoskeletal system and connective tissue     Personal history of arthritis    Personal history of other diseases of the nervous system and sense organs     History of cataract    Personal history of other diseases of the nervous system and sense organs 09/20/2019    History of sciatica     Personal history of other endocrine, nutritional and metabolic disease     History of hypothyroidism    Personal history of other infectious and parasitic diseases 09/23/2017    History of erysipelas    Personal history of other specified conditions 03/06/2020    History of epistaxis    Rash and other nonspecific skin eruption 06/15/2015    Skin rash    Rhinitis 10 years    Sciatica 12/11/2014    Status post joint replacement 06/19/2023    Strain of muscle, fascia and tendon of the posterior muscle group at thigh level, unspecified thigh, initial encounter 05/01/2018    Hamstring strain    Tinnitus 10 years    Vaginal discharge 04/06/2023    Varus deformity, not elsewhere classified, unspecified knee 03/06/2019    Acquired genu varum       No results found for this or any previous visit (from the past 96 hour(s)).     Transthoracic Echo (TTE) Limited    Result Date: 6/18/2024   [facility], [address] [place] [address]                Tel [phone] and Fax [phone] TRANSTHORACIC ECHOCARDIOGRAM REPORT  Patient Name:      CHARLEY LINDA Stearns Physician:    [id number] Berenice Moreno MD Study Date:        6/18/2024            Ordering Provider:    [id number] MARSHA VILLAREAL MRN/PID:           [id number]             Fellow: Accession#:        [id number]         Nurse: Date of Birth/Age: 1945 / 78 years Sonographer:          Evelyne OLVERA Gender:            F                    Additional Staff: Height:            157.48 cm            Admit Date:           6/6/2024 Weight:            70.31 kg             Admission Status:     Inpatient -                                                               Routine BSA / BMI:         1.72 m2 / 28.35      Encounter#:           [id number]                     kg/m2                                         Department Location:  [facility]                                                               T5 Blood Pressure: 142 /66 mmHg Study Type:    TRANSTHORACIC ECHO (TTE) LIMITED Diagnosis/ICD: Presence of prosthetic heart valve-Z95.2 Indication:    s/p TAVR CPT Code:      Echo Limited-82244; Doppler Limited-01306; Color Doppler-06847 Patient History: Pertinent History: CKD, GERD, palpitations, LAYTON, HTN, DM, AI, AS , s/p TAVR 26mm                    Haile (6/17/24). Study Detail: The following Echo studies were performed: 2D, M-Mode, Doppler and               color flow. Technically challenging study due to prominent lung               artifact.  PHYSICIAN INTERPRETATION: Left Ventricle: The left ventricular systolic function is low normal, with an estimated ejection fraction of 50-55%. There are no regional wall motion abnormalities. The left ventricular cavity size is normal. Spectral Doppler shows an impaired relaxation pattern of left ventricular diastolic filling. Left Atrium: The left atrium is normal in size. Right Ventricle: The right ventricle is normal in size. There is normal right ventricular global systolic function. Right Atrium: The right atrium is normal in size. Aortic Valve: There is a prosthetic aortic valve present. There is an Haile transcatheter aortic valve replacement, with a 26 mm reported size. There is no evidence of aortic valve regurgitation. The peak instantaneous gradient of the aortic valve is 15.2 mmHg. The mean gradient of the aortic valve is 7.0 mmHg. S/p 26mm Haile Scottie TAVR with gradients of 15.2/7mmHg and no AI. Mitral Valve: The mitral valve is normal in structure. There is trace mitral valve regurgitation. Tricuspid Valve: The tricuspid valve is structurally normal. There is trace tricuspid regurgitation. The Doppler estimated RVSP is within normal limits at 23.2 mmHg. Pulmonic Valve: The pulmonic valve is not well  visualized. There is physiologic pulmonic valve regurgitation. Pericardium: There is a trivial pericardial effusion. Aorta: The aortic root is normal. Systemic Veins: The inferior vena cava appears to be of normal size. There is IVC inspiratory collapse greater than 50%. In comparison to the previous echocardiogram(s): Compared with study from 6/17/2024,. Compared with the prior exam from 6/17/2024 ( periprocedural TTE) the post TAVR gradients were 10.1/4mmHg wtih no AI.  CONCLUSIONS:  1. Left ventricular systolic function is low normal with a 50-55% estimated ejection fraction.  2. Spectral Doppler shows an impaired relaxation pattern of left ventricular diastolic filling.  3. RVSP within normal limits.  4. S/p 26mm Haile Scottie TAVR with gradients of 15.2/7mmHg and no AI.  5. There is a transcatheter aortic valve replacement.  6. Compared with the prior exam from 6/17/2024 ( periprocedural TTE) the post TAVR gradients were 10.1/4mmHg wtih no AI. QUANTITATIVE DATA SUMMARY: 2D MEASUREMENTS:                          Normal Ranges: LAs:           4.00 cm   (2.7-4.0cm) IVSd:          1.20 cm   (0.6-1.1cm) LVPWd:         1.00 cm   (0.6-1.1cm) LVIDd:         4.30 cm   (3.9-5.9cm) LVIDs:         3.50 cm LV Mass Index: 95.0 g/m2 LV % FS        18.6 % LA VOLUME:                               Normal Ranges: LA Vol A4C:        54.0 ml    (22+/-6mL/m2) LA Vol A2C:        54.0 ml LA Vol BP:         54.0 ml LA Vol Index A4C:  31.5ml/m2 LA Vol Index A2C:  31.5 ml/m2 LA Vol Index BP:   31.5 ml/m2 LA Area A4C:       18.0 cm2 LA Area A2C:       18.0 cm2 LA Major Axis A4C: 5.1 cm LA Major Axis A2C: 5.1 cm LA Volume Index:   32.0 ml/m2 RA VOLUME BY A/L METHOD:                               Normal Ranges: RA Vol A4C:        20.7 ml    (8.3-19.5ml) RA Vol Index A4C:  12.1 ml/m2 RA Area A4C:       10.0 cm2 RA Major Axis A4C: 4.1 cm M-MODE MEASUREMENTS:                  Normal Ranges: Ao Root: 3.20 cm (2.0-3.7cm) LAs:     4.50 cm  (2.7-4.0cm) AORTA MEASUREMENTS:                      Normal Ranges: Ao Sinus, d: 2.80 cm (2.1-3.5cm) Asc Ao, d:   3.40 cm (2.1-3.4cm) Ao Arch:     2.93 cm (2.0-3.6cm) LV SYSTOLIC FUNCTION BY 2D PLANIMETRY (MOD):                     Normal Ranges: EF-A4C View: 53.7 % (>=55%) EF-A2C View: 55.0 % EF-Biplane:  53.5 % LV DIASTOLIC FUNCTION:                               Normal Ranges: MV Peak E:        0.71 m/s    (0.7-1.2 m/s) MV Peak A:        1.25 m/s    (0.42-0.7 m/s) E/A Ratio:        0.57        (1.0-2.2) MV e'             0.05 m/s    (>8.0) MV lateral e'     0.06 m/s MV medial e'      0.04 m/s MV A Dur:         132.00 msec E/e' Ratio:       14.14       (<8.0) a'                0.08 m/s PulmV Sys Elias:    53.10 cm/s PulmV Vargas Elias:   32.60 cm/s PulmV S/D Elias:    1.60 PulmV A Revs Elias: 36.00 cm/s PulmV A Revs Dur: 100.00 msec MITRAL VALVE:                 Normal Ranges: MV DT: 298 msec (150-240msec) AORTIC VALVE:                                    Normal Ranges: AoV Vmax:                1.95 m/s  (<=1.7m/s) AoV Peak PG:             15.2 mmHg (<20mmHg) AoV Mean P.0 mmHg  (1.7-11.5mmHg) LVOT Max Elias:            1.03 m/s  (<=1.1m/s) AoV VTI:                 34.10 cm  (18-25cm) LVOT VTI:                18.00 cm LVOT Diameter:           2.10 cm   (1.8-2.4cm) AoV Area, VTI:           1.83 cm2  (2.5-5.5cm2) AoV Area,Vmax:           1.83 cm2  (2.5-4.5cm2) AoV Dimensionless Index: 0.53  RIGHT VENTRICLE: RV Basal 3.08 cm RV Mid   2.75 cm RV Major 7.0 cm TAPSE:   20.3 mm RV s'    0.19 m/s TRICUSPID VALVE/RVSP:                             Normal Ranges: Peak TR Velocity: 2.25 m/s RV Syst Pressure: 23.2 mmHg (< 30mmHg) IVC Diam:         1.70 cm PULMONIC VALVE:                      Normal Ranges: PV Max Elias: 1.0 m/s  (0.6-0.9m/s) PV Max P.4 mmHg Pulmonary Veins: PulmV A Revs Dur: 100.00 msec PulmV A Revs Elias: 36.00 cm/s PulmV Vargas Elias:   32.60 cm/s PulmV S/D Elias:    1.60 PulmV Sys Elias:    53.10 cm/s  [id number]  Berenice Moreno MD Electronically signed on 6/18/2024 at 4:58:05 PM  ** Final **     Transthoracic Echo (TTE) Limited    Result Date: 6/17/2024   [facility], [address] [place] [address]                Tel [phone] and Fax [phone] TRANSTHORACIC ECHOCARDIOGRAM REPORT  Patient Name:      CHEL MCKEON     Reading Physician:    [id number] Livan Borges MD Study Date:        6/17/2024            Ordering Provider:    [id number] MARSHA VILLAREAL MRN/PID:           [id number]             Fellow: Accession#:        [id number]         Nurse: Date of Birth/Age: 1945 / 78 years Sonographer:          Opal OLVERA Gender:            F                    Additional Staff: Height:            157.00 cm            Admit Date:           6/17/2024 Weight:            70.31 kg             Admission Status:     Inpatient -                                                               Routine BSA / BMI:         1.71 m2 / 28.52      Encounter#:           [id number]                    kg/m2                                         Department Location:  [facility]                                                               Cath Lab Blood Pressure: 115 /70 mmHg Study Type:    TRANSTHORACIC ECHO (TTE) LIMITED Diagnosis/ICD: Nonrheumatic aortic (valve) stenosis-I35.0 Indication:    Periprocedure TAVR CPT Code:      Echo Limited-31066; Color Doppler-27849; Doppler Limited-72698 Patient History: Valve Disorders:   Aortic Stenosis. Diabetes:          Yes Pertinent History: HTN and Palpitations. Heart failure, CKD, Hypothyroid. Study Detail: The following Echo studies were performed: 2D, M-Mode, Doppler and               color flow. Technically challenging study due to prominent lung               artifact.  PHYSICIAN INTERPRETATION: Left Ventricle: The left ventricular systolic  function is mildly decreased, with an estimated ejection fraction of 45-50%. There is global hypokinesis of the left ventricle with minor regional variations. The left ventricular cavity size is mildly dilated. Spectral Doppler shows an impaired relaxation pattern of left ventricular diastolic filling. Left Atrium: The left atrium is mildly dilated. Right Ventricle: The right ventricle is normal in size. There is normal right ventricular global systolic function. Right Atrium: The right atrium is normal in size. Aortic Valve: The aortic valve is trileaflet. There is severe aortic valve cusp calcification. There is severe aortic valve thickening. There is evidence of severe aortic valve stenosis. The aortic valve dimensionless index is 0.25. There is mild to moderate aortic valve regurgitation. The peak instantaneous gradient of the aortic valve is 40.7 mmHg. The mean gradient of the aortic valve is 19.0 mmHg. Mitral Valve: The mitral valve is mildly thickened. There is mild mitral annular calcification. There is trace mitral valve regurgitation. Tricuspid Valve: The tricuspid valve is structurally normal. There is trace tricuspid regurgitation. Pulmonic Valve: The pulmonic valve is not well visualized. The pulmonic valve regurgitation was not well visualized. Pericardium: There is a trivial pericardial effusion. Aorta: The aortic root is normal. The Ao Sinus is 3.20 cm. The Asc Ao is 3.40 cm. Pulmonary Artery: The tricuspid regurgitant velocity is 2.87 m/s, and with an estimated right atrial pressure of 3 mmHg, the estimated pulmonary artery pressure is mildly elevated with the RVSP at 35.9 mmHg. Systemic Veins: The inferior vena cava appears to be of normal size. There is IVC inspiratory collapse greater than 50%. In comparison to the previous echocardiogram(s): There are no prior studies on this patient for comparison purposes. Interval placement of TAVR.  Post Transcatheter Aortic Valve Placement (TAVR): The peak  instantaneous gradient of the aortic valve is 10.1 mmHg. The mean gradient of the aortic valve is 4.0 mmHg. There is an Haile transcatheter aortic valve replacement, with a 26 mm reported size. There is no ciera-prosthetic aortic valve regurgitation.  CONCLUSIONS:  1. Left ventricular systolic function is mildly decreased with a 45-50% estimated ejection fraction.  2. Spectral Doppler shows an impaired relaxation pattern of left ventricular diastolic filling.  3. Post TAVR - there is a transcatheter aortic valve replacement.  4. Severe aortic valve stenosis.  5. There is severe aortic valve cusp calcification.  6. There is severe aortic valve thickening.  7. Mild to moderate aortic valve regurgitation.  8. There is global hypokinesis of the left ventricle with minor regional variations. QUANTITATIVE DATA SUMMARY: 2D MEASUREMENTS:                    Normal Ranges: Ao Root d: 3.20 cm (2.0-3.7cm) LA VOLUME:                               Normal Ranges: LA Vol A4C:        48.3 ml    (22+/-6mL/m2) LA Vol A2C:        68.8 ml LA Vol BP:         59.5 ml LA Vol Index A4C:  28.2ml/m2 LA Vol Index A2C:  40.2 ml/m2 LA Vol Index BP:   34.8 ml/m2 LA Area A4C:       18.1 cm2 LA Area A2C:       20.9 cm2 LA Major Axis A4C: 5.8 cm LA Major Axis A2C: 5.4 cm LA Volume Index:   34.8 ml/m2 LA Vol A4C:        43.8 ml LA Vol A2C:        66.2 ml AORTA MEASUREMENTS:                      Normal Ranges: Ao Sinus, d: 3.20 cm (2.1-3.5cm) Asc Ao, d:   3.40 cm (2.1-3.4cm) LV SYSTOLIC FUNCTION BY 2D PLANIMETRY (MOD):                     Normal Ranges: EF-A4C View: 50.6 % (>=55%) EF-A2C View: 51.6 % EF-Biplane:  49.2 % LV DIASTOLIC FUNCTION:                        Normal Ranges: MV Peak E: 0.85 m/s    (0.7-1.2 m/s) MV Peak A: 1.07 m/s    (0.42-0.7 m/s) E/A Ratio: 0.79        (1.0-2.2) MV A Dur:  119.00 msec MITRAL VALVE:                 Normal Ranges: MV DT: 267 msec (150-240msec) AORTIC VALVE:                                              Normal  Ranges: AoV Vmax:                          3.19 m/s  (<=1.7m/s) AoV Vmax Post TAVR:                1.59 m/s  (<=1.7m/s) AoV Peak P.7 mmHg (<20mmHg) AoV Peak PG Post TAVR:             10.1 mmHg (<20mmHg) AoV Mean P.0 mmHg (1.7-11.5mmHg) AoV Mean PG Post TAVR:             4.0 mmHg  (1.7-11.5mmHg) LVOT Max Elias:                      0.84 m/s  (<=1.1m/s) LVOT Max Elias Post TAVR:            0.83 m/s  (<=1.1m/s) AoV VTI:                           76.30 cm  (18-25cm) AoV VTI Post TAVR:                 29.80 cm  (18-25cm) LVOT VTI:                          19.20 cm LVOT VTI Post TAVR:                17.50 cm LVOT Diameter:                     1.90 cm   (1.8-2.4cm) AoV Area, VTI:                     0.71 cm2  (2.5-5.5cm2) AoV Area,Vmax:                     0.74 cm2  (2.5-4.5cm2) AoV Dimensionless Index:           0.25 AoV Dimensionless Index Post TAVR: 0.59 AORTIC INSUFFICIENCY: AI Vmax:       4.12 m/s AI Half-time:  371 msec AI Decel Rate: 300.50 cm/s2  RIGHT VENTRICLE: TAPSE: 25.0 mm TRICUSPID VALVE/RVSP:                             Normal Ranges: Peak TR Velocity: 2.87 m/s Est. RA Pressure: 3 mmHg RV Syst Pressure: 35.9 mmHg (< 30mmHg) IVC Diam:         1.60 cm  [id number] Livan Borges MD Electronically signed on 2024 at 11:24:13 AM  ** Final **                  Heart Failure Follow up    NYHA class 1    Edema Denies  Dyspnea on Exertion Denies  Fatigue Improved  Exercise Intolerance Improved  Orthopnea Denies  PND Denies    Chest pain No  Syncope No  Palpitations No    All organ systems normal               KCCQ Questionnaire  1  Heart failure affects different people in different ways. Some feel shortness of breath while others feel fatigue. Please indicate how much you are limited by heart failure (shortness of breath or fatigue) in your ability to do the following activities over the past 2 weeks.     A.) Showering/bathing  5. Not at All  B.) Walking 1 block on  level ground 5. Not at All  C.) Hurrying or Jogging   6. Limited for other reastons    2.  Over the past 2 weeks, how many times did you have swelling in your feet, ankles or legs when you woke up in the morning? 5. Never    3.  Over the past 2 weeks, on average, how many times has fatigue limited your ability to do what you wanted? 6. Less than once a week    4.  Over the past 2 weeks, on average, how many times has shortness of breath limited your ability to do what you wanted? 7. Never    5.  Over the past 2 weeks, on average, how many times have you been forced to sleep sitting up in a chair or with at least 3 pillows to prop you up because of shortness of breath? Never    6. Over the past 2 weeks, how much has your heart failure limited your enjoyment of life? It has not limited my enjoyment of life    7. If you had to spend the rest of your life with your heart failure the way it is right now, how would you feel about this? 5. Completely satisfied    8. How much does your heart failure affect your lifestyle? Please indicate how your heart failure may have limited yourparticipation in the following activities over the past 2 weeks    A.)  Hobbies, recreational activities  5. Did not limit at all    B.) Working or doing household chores  5. Did not limit at all    C.) Visiting family or friends out of your home  5. Did not limit at all      Kristin Charles is now s/p TAVR Scottie 3 26mm via B/L femoral artery (Left primary) on 6/17/24 with Dr. Lazaro and Dr. Aranda.  Pt developed a new LBBB post procedure, temp wire mainatained.  Final procedure ECHO showed a mean gradient of 4, no PVL, stable triv PC effusion.  POD 1 ECHO - EF 50-55%, no significant AI/PVL, gradients 12/6, no significant PC effusion.   Pt discharged home on POD 1 after ambulating around the Step-down.      Pre EKG - SR 71 with iRBBB and LAFB (, )  Post EKG - SB 56 with iRBBB (, )   POD 1 EKG - SR 74 with LAFB (IL  176, )    Impression  - 1 month s/p TAVR  - Pt appears to be euvolemic with flat neck veins and no BLE edema.  Work of breathing normal with NAD, skin tone without pallor.   - HF symptoms:  denies SOB/LAYTON and BLE edema.  Improving fatigue  - vitals:  -110/60, wt stable  - groins:  healed  - Overall visually looks great, clinically doing very well.  Encouraged to increase her activity with a regular exercise routine, would likely benefit from participating in a cardiac rehab program.    1 month ECHO - scheduled for 7/22, will follow/discuss results      Plan:   Cont ASA for life  Cont current medication regimen  Cont to increase activity  f/u with Structural NP in 1 year - ECHO can be closer to home  f/u with Dr. Fang (Primary Cards) as scheduled or in 6-10 weeks  Life-long Dental SBE prophylaxis needed - Amoxicillin ordered      Virtual Visit    Travis LOUIS, [facility]  Acute Care Nurse Practitioner  Structural Heart / TAVR Team  [phone] (ph)  [phone] (fax)

## 2024-07-22 ENCOUNTER — HOSPITAL ENCOUNTER (OUTPATIENT)
Dept: CARDIOLOGY | Facility: CLINIC | Age: 79
Discharge: HOME | End: 2024-07-22
Payer: MEDICARE

## 2024-07-22 ENCOUNTER — PHARMACY VISIT (OUTPATIENT)
Dept: PHARMACY | Facility: CLINIC | Age: 79
End: 2024-07-22
Payer: COMMERCIAL

## 2024-07-22 DIAGNOSIS — Z95.2 S/P TAVR (TRANSCATHETER AORTIC VALVE REPLACEMENT): ICD-10-CM

## 2024-07-22 DIAGNOSIS — I35.0 AORTIC VALVE STENOSIS, ETIOLOGY OF CARDIAC VALVE DISEASE UNSPECIFIED: ICD-10-CM

## 2024-07-22 LAB
AORTIC VALVE MEAN GRADIENT: 14.5 MMHG
AORTIC VALVE PEAK VELOCITY: 2.52 M/S
AV PEAK GRADIENT: 25.4 MMHG
EJECTION FRACTION APICAL 4 CHAMBER: 49.3
EJECTION FRACTION: 53 %
LEFT ATRIUM VOLUME AREA LENGTH INDEX BSA: 35.3 ML/M2
LEFT VENTRICLE INTERNAL DIMENSION DIASTOLE: 4.18 CM (ref 3.5–6)
MITRAL VALVE E/A RATIO: 0.59
RIGHT VENTRICLE FREE WALL PEAK S': 19 CM/S
RIGHT VENTRICLE PEAK SYSTOLIC PRESSURE: 25.3 MMHG
TRICUSPID ANNULAR PLANE SYSTOLIC EXCURSION: 2 CM

## 2024-07-22 PROCEDURE — RXMED WILLOW AMBULATORY MEDICATION CHARGE

## 2024-07-22 PROCEDURE — 93306 TTE W/DOPPLER COMPLETE: CPT | Performed by: STUDENT IN AN ORGANIZED HEALTH CARE EDUCATION/TRAINING PROGRAM

## 2024-07-22 PROCEDURE — 93306 TTE W/DOPPLER COMPLETE: CPT

## 2024-07-23 ENCOUNTER — OFFICE VISIT (OUTPATIENT)
Dept: PRIMARY CARE | Facility: CLINIC | Age: 79
End: 2024-07-23
Payer: MEDICARE

## 2024-07-23 VITALS
DIASTOLIC BLOOD PRESSURE: 82 MMHG | SYSTOLIC BLOOD PRESSURE: 134 MMHG | BODY MASS INDEX: 28.35 KG/M2 | WEIGHT: 155 LBS | TEMPERATURE: 96.2 F

## 2024-07-23 DIAGNOSIS — E11.9 DIABETES MELLITUS, TYPE II, INSULIN DEPENDENT (MULTI): ICD-10-CM

## 2024-07-23 DIAGNOSIS — Z79.4 DIABETES MELLITUS, TYPE II, INSULIN DEPENDENT (MULTI): ICD-10-CM

## 2024-07-23 DIAGNOSIS — E03.9 HYPOTHYROIDISM, UNSPECIFIED TYPE: Primary | ICD-10-CM

## 2024-07-23 PROBLEM — L65.9 HAIR LOSS: Status: RESOLVED | Noted: 2023-04-06 | Resolved: 2024-07-23

## 2024-07-23 LAB — POC FINGERSTICK BLOOD GLUCOSE: 140 MG/DL (ref 70–100)

## 2024-07-23 PROCEDURE — 3075F SYST BP GE 130 - 139MM HG: CPT | Performed by: FAMILY MEDICINE

## 2024-07-23 PROCEDURE — 1036F TOBACCO NON-USER: CPT | Performed by: FAMILY MEDICINE

## 2024-07-23 PROCEDURE — 1123F ACP DISCUSS/DSCN MKR DOCD: CPT | Performed by: FAMILY MEDICINE

## 2024-07-23 PROCEDURE — 82962 GLUCOSE BLOOD TEST: CPT | Performed by: FAMILY MEDICINE

## 2024-07-23 PROCEDURE — 3079F DIAST BP 80-89 MM HG: CPT | Performed by: FAMILY MEDICINE

## 2024-07-23 PROCEDURE — 99213 OFFICE O/P EST LOW 20 MIN: CPT | Performed by: FAMILY MEDICINE

## 2024-07-23 NOTE — PROGRESS NOTES
Chief complaint:   Chief Complaint   Patient presents with    Follow-up     1 month follow up       HPI:  Kristin Charles is a 79 y.o. female who presents for evaluation of her blood sugar. She has been unable to get a read on her glucose monitor as it tells her error, use new test strip. She replaced the batteries on her old device and obtained a new device with the same error but was able to use it on her daughter in law last night and it worked for her. Today she brings it using it on herself in and again error. She is currently on tapering dose of Prednisone and Insulin is at 6 units at bedtime (Tresiba flextouch).    Physical exam:  /82   Temp 35.7 °C (96.2 °F)   Wt 70.3 kg (155 lb)   BMI 28.35 kg/m²   General: NAD, well appearing female  Heart: RRR, no mumur appreciated  Lungs: CTAB, no wheezes, rales, rhonchi  Abdomen: soft, non tender, normoactive BS, no organomegaly  Extremities: No LE edema    Assessment/Plan   Problem List Items Addressed This Visit       Hypothyroidism - Primary    Relevant Orders    Tsh With Reflex To Free T4 If Abnormal    Diabetes mellitus, type II, insulin dependent (Multi)    Relevant Orders    POCT Fingerstick Glucose manually resulted (Completed)    Basic metabolic panel    CBC   Decrease insulin to 5 units from 6  Will look into new device/solution  IO glucose on our monitor not fasting was 140 today.  Labs to be done fasting tomorrow  Follow up 2-3 weeks, sooner as needed    Melanie Muñoz, DO

## 2024-07-24 ENCOUNTER — LAB (OUTPATIENT)
Dept: LAB | Facility: LAB | Age: 79
End: 2024-07-24
Payer: MEDICARE

## 2024-07-24 ENCOUNTER — APPOINTMENT (OUTPATIENT)
Dept: PHARMACY | Facility: HOSPITAL | Age: 79
End: 2024-07-24
Payer: MEDICARE

## 2024-07-24 DIAGNOSIS — E03.9 HYPOTHYROIDISM, UNSPECIFIED TYPE: ICD-10-CM

## 2024-07-24 DIAGNOSIS — E11.9 DIABETES MELLITUS, TYPE II, INSULIN DEPENDENT (MULTI): ICD-10-CM

## 2024-07-24 DIAGNOSIS — E11.9 DIABETES MELLITUS TYPE II, NON INSULIN DEPENDENT (MULTI): ICD-10-CM

## 2024-07-24 DIAGNOSIS — Z79.4 DIABETES MELLITUS, TYPE II, INSULIN DEPENDENT (MULTI): ICD-10-CM

## 2024-07-24 DIAGNOSIS — E11.9 DIABETES MELLITUS, TYPE II, INSULIN DEPENDENT (MULTI): Primary | ICD-10-CM

## 2024-07-24 DIAGNOSIS — Z79.4 DIABETES MELLITUS, TYPE II, INSULIN DEPENDENT (MULTI): Primary | ICD-10-CM

## 2024-07-24 LAB
ANION GAP SERPL CALC-SCNC: 11 MMOL/L (ref 10–20)
BUN SERPL-MCNC: 19 MG/DL (ref 6–23)
CALCIUM SERPL-MCNC: 8.7 MG/DL (ref 8.6–10.6)
CHLORIDE SERPL-SCNC: 106 MMOL/L (ref 98–107)
CO2 SERPL-SCNC: 27 MMOL/L (ref 21–32)
CREAT SERPL-MCNC: 0.84 MG/DL (ref 0.5–1.05)
EGFRCR SERPLBLD CKD-EPI 2021: 70 ML/MIN/1.73M*2
ERYTHROCYTE [DISTWIDTH] IN BLOOD BY AUTOMATED COUNT: 12.6 % (ref 11.5–14.5)
GLUCOSE SERPL-MCNC: 99 MG/DL (ref 74–99)
HCT VFR BLD AUTO: 42.4 % (ref 36–46)
HGB BLD-MCNC: 13.8 G/DL (ref 12–16)
MCH RBC QN AUTO: 30.5 PG (ref 26–34)
MCHC RBC AUTO-ENTMCNC: 32.5 G/DL (ref 32–36)
MCV RBC AUTO: 94 FL (ref 80–100)
NRBC BLD-RTO: 0 /100 WBCS (ref 0–0)
PLATELET # BLD AUTO: 194 X10*3/UL (ref 150–450)
POTASSIUM SERPL-SCNC: 4.1 MMOL/L (ref 3.5–5.3)
RBC # BLD AUTO: 4.52 X10*6/UL (ref 4–5.2)
SODIUM SERPL-SCNC: 140 MMOL/L (ref 136–145)
TSH SERPL-ACNC: 0.51 MIU/L (ref 0.44–3.98)
WBC # BLD AUTO: 6.9 X10*3/UL (ref 4.4–11.3)

## 2024-07-24 PROCEDURE — 84443 ASSAY THYROID STIM HORMONE: CPT

## 2024-07-24 PROCEDURE — 80048 BASIC METABOLIC PNL TOTAL CA: CPT

## 2024-07-24 PROCEDURE — RXMED WILLOW AMBULATORY MEDICATION CHARGE

## 2024-07-24 PROCEDURE — 85027 COMPLETE CBC AUTOMATED: CPT

## 2024-07-24 PROCEDURE — 36415 COLL VENOUS BLD VENIPUNCTURE: CPT

## 2024-07-24 RX ORDER — IBUPROFEN 200 MG
1 CAPSULE ORAL 2 TIMES DAILY
Qty: 50 STRIP | Refills: 2 | Status: SHIPPED | OUTPATIENT
Start: 2024-07-24

## 2024-07-24 NOTE — PROGRESS NOTES
"[facility] Pharmacy Consult  Kristin Charles \"Edel\" is a 79 y.o. female was referred to Clinical Pharmacy Team for a Pharmacy consult.  The patient was referred f3or their Diabetes.    Referring Provider: Melanie Muñoz DO    Subjective   Allergies   Allergen Reactions    Other Other    Sulfa (Sulfonamide Antibiotics) Unknown    Miconazole Rash    Neomycin-Bacitracin-Polymyxin Rash       [facility] - [place], [place] - [address] [facility]  [address] [facility] [address]  Phone: [phone] Fax: [phone]    [facility]  [address], [address]  [facility] [address]  Phone: [phone] Fax: [phone]    [facility]  [address], [address]  [facility] [address]  Phone: [phone] Fax: [phone]      Diabetes  She presents for her follow-up diabetic visit. She has type 2 diabetes mellitus. Her disease course has been stable. There are no hypoglycemic associated symptoms. There are no diabetic associated symptoms. There are no hypoglycemic complications. Symptoms are stable. There are no diabetic complications. Risk factors for coronary artery disease include diabetes mellitus. Current diabetic treatment includes insulin injections. She is compliant with treatment all of the time. There is no change in her home blood glucose trend. Her breakfast blood glucose is taken between 7-8 am. Her breakfast blood glucose range is generally  mg/dl.       Patient reported that her glucometer is no longer working. Brought in Glucometer, which is giving E-11 error.  Review of Systems    Objective     There were no vitals taken for this visit.     LAB  Lab Results   Component Value Date    BILITOT 0.6 05/31/2024    CALCIUM 8.7 07/24/2024    CO2 27 07/24/2024     07/24/2024    CREATININE 0.84 07/24/2024    GLUCOSE 99 07/24/2024    ALKPHOS 37 05/31/2024    K 4.1 07/24/2024    PROT 5.8 (L) 05/31/2024     07/24/2024    AST 12 05/31/2024    ALT 12 " "2024    BUN 19 2024    ANIONGAP 11 2024    MG 2.03 2024    PHOS 3.3 10/07/2022    ALBUMIN 3.7 2024    GFRF CANCELED 2023    GFRMALE CANCELED 2023     Lab Results   Component Value Date    TRIG 136 11/15/2023    CHOL 171 11/15/2023    LDLCALC 90 11/15/2023    HDL 53.6 11/15/2023     Lab Results   Component Value Date    HGBA1C 11.6 (A) 2024       Current Outpatient Medications on File Prior to Visit   Medication Sig Dispense Refill    acetaminophen (Tylenol) 325 mg tablet Take 2 tablets (650 mg) by mouth every 6 hours if needed for moderate pain (4 - 6), mild pain (1 - 3), headaches or fever (temp greater than 38.0 C).      amoxicillin (Amoxil) 500 mg capsule Take 4 caps (2000 mg) 30-60mins prior to your dental appointment 4 capsule 5    aspirin 81 mg EC tablet Take 1 tablet (81 mg) by mouth once daily.      biotin 10 mg tablet Take 1 tablet (10 mg) by mouth once daily.      blood-glucose meter misc Please check blood sugars 2 times daily 1 each 0    empagliflozin (Jardiance) 10 mg Take 1 tablet (10 mg) by mouth once daily. 90 tablet 3    famotidine (Pepcid) 10 mg tablet Take 1 tablet (10 mg) by mouth once daily as needed for heartburn.      [] fluconazole (Diflucan) 150 mg tablet Take 1 tablet (150 mg) by mouth 1 time for 1 dose. 1 tablet 0    insulin degludec (Tresiba FlexTouch) 100 unit/mL (3 mL) injection Inject 7 Units under the skin once daily at bedtime. Take as directed per insulin instructions. 15 mL 3    lancets misc Please check blood sugar 2 times daily 100 each 1    levothyroxine (Synthroid, Levoxyl) 125 mcg tablet Take 1 tablet (125 mcg) by mouth once daily. 90 tablet 1    metoprolol succinate XL (Toprol-XL) 25 mg 24 hr tablet Take 1 tablet (25 mg) by mouth once daily. Do not crush or chew. 90 tablet 3    pen needle, diabetic 31 gauge x 5/16\" needle Use as directed to inject insulin once daily 100 each 11    predniSONE (Deltasone) 1 mg tablet Take 2 " tablets (2 mg) by mouth once daily. 180 tablet 0    predniSONE (Deltasone) 5 mg tablet Take 1 tablet (5 mg) by mouth once daily. (Patient taking differently: Take 2 tablets (10 mg) by mouth once daily.) 90 tablet 0    sacubitriL-valsartan (Entresto) 24-26 mg tablet Take 1 tablet by mouth once daily. (Patient taking differently: Take 0.5 tablets by mouth 2 times a day.) 90 tablet 3    traZODone (Desyrel) 50 mg tablet Take 1 tablet (50 mg) by mouth as needed at bedtime.      [DISCONTINUED] blood sugar diagnostic (Blood Glucose Test) strip Use 1 strip 2 times a day to test blood sugar. 100 strip 1    [DISCONTINUED] blood-glucose meter misc Please check blood sugars 2 times daily 1 each 0     No current facility-administered medications on file prior to visit.        HISTORICAL PHARMACOTHERAPY  -none    DRUG INTERACTIONS  - none      Assessment/Plan   Problem List Items Addressed This Visit       Diabetes mellitus, type II, insulin dependent (Multi) - Primary     CONTINUE taking Tresiba 6 units nightly  Educate on how to use glucometer. Pt to get new test strips from [facility]. E-11 Error most likley cause by faulty strips. Both supplies of her last test strips came from the same place, most likely due to faulty LOT. Will try new LOT test strips, if does not work, will transition over to OneTouch ultra glucometer  Educate pt on hypo- and hyper glycemia  FUV in 3 weeks.         Relevant Medications    blood sugar diagnostic (Blood Glucose Test) strip     Other Visit Diagnoses       Diabetes mellitus type II, non insulin dependent (Multi)        Relevant Medications    blood sugar diagnostic (Blood Glucose Test) strip             Continue all meds under the continuation of care with the referring provider and clinical pharmacy team.    Luis Pelaez PharmD     Verbal consent to manage patient's drug therapy was obtained from [the patient and/or an individual authorized to act on behalf of a patient]. They were  informed they may decline to participate or withdraw from participation in pharmacy services at any time.

## 2024-07-25 ENCOUNTER — PHARMACY VISIT (OUTPATIENT)
Dept: PHARMACY | Facility: CLINIC | Age: 79
End: 2024-07-25
Payer: COMMERCIAL

## 2024-07-26 ASSESSMENT — ENCOUNTER SYMPTOMS: DIABETIC ASSOCIATED SYMPTOMS: 0

## 2024-07-26 NOTE — ASSESSMENT & PLAN NOTE
CONTINUE taking Tresiba 6 units nightly  Educate on how to use glucometer. Pt to get new test strips from [facility]. E-11 Error most likley cause by faulty strips. Both supplies of her last test strips came from the same place, most likely due to faulty LOT. Will try new LOT test strips, if does not work, will transition over to OneTouch ultra glucometer  Educate pt on hypo- and hyper glycemia  FUV in 3 weeks.

## 2024-07-29 ENCOUNTER — PHARMACY VISIT (OUTPATIENT)
Dept: PHARMACY | Facility: CLINIC | Age: 79
End: 2024-07-29
Payer: COMMERCIAL

## 2024-07-29 ENCOUNTER — APPOINTMENT (OUTPATIENT)
Dept: PRIMARY CARE | Facility: CLINIC | Age: 79
End: 2024-07-29
Payer: MEDICARE

## 2024-08-12 PROBLEM — M17.12 LEFT KNEE DJD: Status: RESOLVED | Noted: 2023-04-06 | Resolved: 2024-08-12

## 2024-08-12 PROBLEM — M35.3 PMR (POLYMYALGIA RHEUMATICA) (MULTI): Status: ACTIVE | Noted: 2024-08-12

## 2024-08-12 PROBLEM — M19.90 OSTEOARTHRITIS: Status: RESOLVED | Noted: 2023-04-06 | Resolved: 2024-08-12

## 2024-08-12 PROBLEM — M17.10 OSTEOARTHRITIS, LOCALIZED, KNEE: Status: RESOLVED | Noted: 2023-04-06 | Resolved: 2024-08-12

## 2024-08-12 PROBLEM — J34.3 NASAL TURBINATE HYPERTROPHY: Status: RESOLVED | Noted: 2023-04-06 | Resolved: 2024-08-12

## 2024-08-12 NOTE — PROGRESS NOTES
Chief complaint:   Chief Complaint   Patient presents with    3 week follow up    Med Refill     Diflucan     Discuss thyroid blood work       HPI:  Kristin Charles is a 79 y.o. female who presents for evaluation of Jardiance was decreased by cardiology from 25 mg to 10 mg. She has less severe vaginal irritation. She is taking Insulin 6 units and Prednisone 5 mg currently. She continues to monitor blood sugars.    Physical exam:  /80   Wt 69.9 kg (154 lb)   BMI 28.17 kg/m²   General: NAD, well appearing female  Heart: RRR, no mumur appreciated  Lungs: CTAB, no wheezes, rales, rhonchi    Assessment/Plan   Problem List Items Addressed This Visit       Diabetes mellitus, type II, insulin dependent (Multi) - Primary    Relevant Medications    insulin degludec (Tresiba FlexTouch) 100 unit/mL (3 mL) injection    PMR (polymyalgia rheumatica) (Multi)     Other Visit Diagnoses       Diabetes mellitus type II, non insulin dependent (Multi)        Relevant Medications    insulin degludec (Tresiba FlexTouch) 100 unit/mL (3 mL) injection    Yeast infection        Relevant Medications    fluconazole (Diflucan) 150 mg tablet        Labs from 7/24/2024 reviewed (BMP, CBC, TSH) and ok  Decrease Insulin from 6 units to 5 units and decrease by 1 unit with each drop in prednisone. Call if < 90 or > 150 blood sugars  Continue Prednisone taper as recommended by her rheumatologist  Continue blood sugar monitoring  Follow up 6 weeks, sooner as needed    Melanie Muñoz,

## 2024-08-13 ENCOUNTER — APPOINTMENT (OUTPATIENT)
Dept: PRIMARY CARE | Facility: CLINIC | Age: 79
End: 2024-08-13
Payer: MEDICARE

## 2024-08-13 VITALS — DIASTOLIC BLOOD PRESSURE: 80 MMHG | SYSTOLIC BLOOD PRESSURE: 124 MMHG | WEIGHT: 154 LBS | BODY MASS INDEX: 28.17 KG/M2

## 2024-08-13 DIAGNOSIS — E11.9 DIABETES MELLITUS, TYPE II, INSULIN DEPENDENT (MULTI): Primary | ICD-10-CM

## 2024-08-13 DIAGNOSIS — E11.9 DIABETES MELLITUS TYPE II, NON INSULIN DEPENDENT (MULTI): ICD-10-CM

## 2024-08-13 DIAGNOSIS — Z79.4 DIABETES MELLITUS, TYPE II, INSULIN DEPENDENT (MULTI): Primary | ICD-10-CM

## 2024-08-13 DIAGNOSIS — B37.9 YEAST INFECTION: ICD-10-CM

## 2024-08-13 DIAGNOSIS — M35.3 PMR (POLYMYALGIA RHEUMATICA) (MULTI): ICD-10-CM

## 2024-08-13 PROCEDURE — 3079F DIAST BP 80-89 MM HG: CPT | Performed by: FAMILY MEDICINE

## 2024-08-13 PROCEDURE — 1123F ACP DISCUSS/DSCN MKR DOCD: CPT | Performed by: FAMILY MEDICINE

## 2024-08-13 PROCEDURE — 99214 OFFICE O/P EST MOD 30 MIN: CPT | Performed by: FAMILY MEDICINE

## 2024-08-13 PROCEDURE — 3074F SYST BP LT 130 MM HG: CPT | Performed by: FAMILY MEDICINE

## 2024-08-13 PROCEDURE — 1036F TOBACCO NON-USER: CPT | Performed by: FAMILY MEDICINE

## 2024-08-13 RX ORDER — INSULIN DEGLUDEC 100 U/ML
5 INJECTION, SOLUTION SUBCUTANEOUS NIGHTLY
Qty: 15 ML | Refills: 3 | Status: SHIPPED | OUTPATIENT
Start: 2024-08-13

## 2024-08-13 RX ORDER — CELECOXIB 200 MG/1
200 CAPSULE ORAL DAILY
COMMUNITY
Start: 2024-08-03

## 2024-08-13 RX ORDER — FLUCONAZOLE 150 MG/1
150 TABLET ORAL ONCE
Qty: 1 TABLET | Refills: 0 | Status: SHIPPED | OUTPATIENT
Start: 2024-08-13 | End: 2024-08-13

## 2024-08-21 ENCOUNTER — APPOINTMENT (OUTPATIENT)
Dept: PHARMACY | Facility: HOSPITAL | Age: 79
End: 2024-08-21
Payer: MEDICARE

## 2024-08-21 DIAGNOSIS — E11.9 DIABETES MELLITUS, TYPE II, INSULIN DEPENDENT (MULTI): ICD-10-CM

## 2024-08-21 DIAGNOSIS — E11.9 DIABETES MELLITUS TYPE II, NON INSULIN DEPENDENT (MULTI): Primary | ICD-10-CM

## 2024-08-21 DIAGNOSIS — Z79.4 DIABETES MELLITUS, TYPE II, INSULIN DEPENDENT (MULTI): ICD-10-CM

## 2024-08-21 ASSESSMENT — ENCOUNTER SYMPTOMS: DIABETIC ASSOCIATED SYMPTOMS: 0

## 2024-08-21 NOTE — ASSESSMENT & PLAN NOTE
CONTINUE all medications as prescribed  No longer having issues with her meter  Need update A1c, follows ENDO and will get an updated A1c at endo visit coming up  DM uncontrolled; but improving  FUV in 3 months

## 2024-08-21 NOTE — PROGRESS NOTES
"[facility] Pharmacy Consult  Kristin Charles \"Edel\" is a 79 y.o. female was referred to Clinical Pharmacy Team for a Pharmacy consult.  The patient was referred for their Diabetes.    Referring Provider: Melanie Muñoz DO    Subjective   Allergies   Allergen Reactions    Other Other    Sulfa (Sulfonamide Antibiotics) Unknown    Miconazole Rash    Neomycin-Bacitracin-Polymyxin Rash       [facility] - [place], [place] - [address] [facility]  [address] [facility] [address]  Phone: [phone] Fax: [phone]    [facility]  [address]  [facility] [address]  Phone: [phone] Fax: [phone]    [facility]  [address], [address]  [facility] [address]  Phone: [phone] Fax: [phone]      Diabetes  She presents for her follow-up diabetic visit. She has type 1 diabetes mellitus. Her disease course has been stable. There are no hypoglycemic associated symptoms. There are no diabetic associated symptoms. There are no hypoglycemic complications. Symptoms are stable. There are no diabetic complications. Risk factors for coronary artery disease include diabetes mellitus. Current diabetic treatment includes insulin injections. She is compliant with treatment all of the time. Her home blood glucose trend is decreasing steadily. Her breakfast blood glucose is taken between 7-8 am. Her breakfast blood glucose range is generally  mg/dl.       Review of Systems    Objective     There were no vitals taken for this visit.     LAB  Lab Results   Component Value Date    BILITOT 0.6 05/31/2024    CALCIUM 8.7 07/24/2024    CO2 27 07/24/2024     07/24/2024    CREATININE 0.84 07/24/2024    GLUCOSE 99 07/24/2024    ALKPHOS 37 05/31/2024    K 4.1 07/24/2024    PROT 5.8 (L) 05/31/2024     07/24/2024    AST 12 05/31/2024    ALT 12 05/31/2024    BUN 19 07/24/2024    ANIONGAP 11 07/24/2024    MG 2.03 06/18/2024    PHOS 3.3 10/07/2022    ALBUMIN 3.7 " "05/31/2024    GFRF CANCELED 05/27/2023    GFRMALE CANCELED 05/27/2023     Lab Results   Component Value Date    TRIG 136 11/15/2023    CHOL 171 11/15/2023    LDLCALC 90 11/15/2023    HDL 53.6 11/15/2023     Lab Results   Component Value Date    HGBA1C 11.6 (A) 05/07/2024       Current Outpatient Medications on File Prior to Visit   Medication Sig Dispense Refill    acetaminophen (Tylenol) 325 mg tablet Take 2 tablets (650 mg) by mouth every 6 hours if needed for moderate pain (4 - 6), mild pain (1 - 3), headaches or fever (temp greater than 38.0 C). (Patient not taking: Reported on 8/13/2024)      amoxicillin (Amoxil) 500 mg capsule Take 4 caps (2000 mg) 30-60mins prior to your dental appointment 4 capsule 5    aspirin 81 mg EC tablet Take 1 tablet (81 mg) by mouth once daily.      biotin 10 mg tablet Take 1 tablet (10 mg) by mouth once daily.      blood sugar diagnostic (Blood Glucose Test) strip Use 1 strip 2 times a day to test blood sugar. 50 strip 2    blood-glucose meter misc Please check blood sugars 2 times daily 1 each 0    celecoxib (CeleBREX) 200 mg capsule Take 1 capsule (200 mg) by mouth once daily.      empagliflozin (Jardiance) 10 mg Take 1 tablet (10 mg) by mouth once daily. 90 tablet 3    famotidine (Pepcid) 10 mg tablet Take 1 tablet (10 mg) by mouth once daily as needed for heartburn.      insulin degludec (Tresiba FlexTouch) 100 unit/mL (3 mL) injection Inject 5 Units under the skin once daily at bedtime. Take as directed per insulin instructions. 15 mL 3    lancets misc Please check blood sugar 2 times daily 100 each 1    levothyroxine (Synthroid, Levoxyl) 125 mcg tablet Take 1 tablet (125 mcg) by mouth once daily. 90 tablet 1    metoprolol succinate XL (Toprol-XL) 25 mg 24 hr tablet Take 1 tablet (25 mg) by mouth once daily. Do not crush or chew. 90 tablet 3    pen needle, diabetic 31 gauge x 5/16\" needle Use as directed to inject insulin once daily 100 each 11    predniSONE (Deltasone) 5 mg " tablet Take 1 tablet (5 mg) by mouth once daily. 90 tablet 0    sacubitriL-valsartan (Entresto) 24-26 mg tablet Take 1 tablet by mouth once daily. (Patient taking differently: Take 0.5 tablets by mouth 2 times a day.) 90 tablet 3    traZODone (Desyrel) 50 mg tablet Take 1 tablet (50 mg) by mouth as needed at bedtime.       No current facility-administered medications on file prior to visit.        HISTORICAL PHARMACOTHERAPY  -none    DRUG INTERACTIONS  - none    SECONDARY PREVENTION  - Statin? yes  - ACE-I/ARB? yes    Assessment/Plan   Problem List Items Addressed This Visit       Diabetes mellitus, type II, insulin dependent (Multi)     CONTINUE all medications as prescribed  No longer having issues with her meter  Need update A1c, follows ENDO and will get an updated A1c at endo visit coming up  DM uncontrolled; but improving  FUV in 3 months          Other Visit Diagnoses       Diabetes mellitus type II, non insulin dependent (Multi)    -  Primary             Continue all meds under the continuation of care with the referring provider and clinical pharmacy team.    Luis Pelaez PharmD     Verbal consent to manage patient's drug therapy was obtained from [the patient and/or an individual authorized to act on behalf of a patient]. They were informed they may decline to participate or withdraw from participation in pharmacy services at any time.

## 2024-08-23 ENCOUNTER — APPOINTMENT (OUTPATIENT)
Dept: CARDIOLOGY | Facility: CLINIC | Age: 79
End: 2024-08-23
Payer: MEDICARE

## 2024-08-23 ENCOUNTER — PATIENT MESSAGE (OUTPATIENT)
Dept: PRIMARY CARE | Facility: CLINIC | Age: 79
End: 2024-08-23

## 2024-08-23 DIAGNOSIS — M35.3 PMR (POLYMYALGIA RHEUMATICA) (MULTI): ICD-10-CM

## 2024-08-23 DIAGNOSIS — E11.9 DIABETES MELLITUS TYPE II, NON INSULIN DEPENDENT (MULTI): ICD-10-CM

## 2024-08-23 RX ORDER — PREDNISONE 5 MG/1
5 TABLET ORAL DAILY
Qty: 30 TABLET | Refills: 1 | Status: SHIPPED | OUTPATIENT
Start: 2024-08-23 | End: 2024-08-23 | Stop reason: SDUPTHER

## 2024-08-23 RX ORDER — PREDNISONE 5 MG/1
TABLET ORAL
Qty: 30 TABLET | Refills: 1 | Status: SHIPPED | OUTPATIENT
Start: 2024-08-23

## 2024-08-23 RX ORDER — INSULIN DEGLUDEC 100 U/ML
5 INJECTION, SOLUTION SUBCUTANEOUS NIGHTLY
Qty: 15 ML | Refills: 3 | Status: SHIPPED | OUTPATIENT
Start: 2024-08-23

## 2024-08-24 DIAGNOSIS — E11.9 DIABETES MELLITUS TYPE II, NON INSULIN DEPENDENT (MULTI): ICD-10-CM

## 2024-08-26 ENCOUNTER — APPOINTMENT (OUTPATIENT)
Dept: CARDIOLOGY | Facility: CLINIC | Age: 79
End: 2024-08-26
Payer: MEDICARE

## 2024-08-26 VITALS
HEART RATE: 64 BPM | BODY MASS INDEX: 28.34 KG/M2 | WEIGHT: 154 LBS | SYSTOLIC BLOOD PRESSURE: 110 MMHG | HEIGHT: 62 IN | DIASTOLIC BLOOD PRESSURE: 62 MMHG

## 2024-08-26 DIAGNOSIS — Z95.2 S/P TAVR (TRANSCATHETER AORTIC VALVE REPLACEMENT): Primary | ICD-10-CM

## 2024-08-26 DIAGNOSIS — I35.0 NONRHEUMATIC AORTIC VALVE STENOSIS: ICD-10-CM

## 2024-08-26 DIAGNOSIS — I10 PRIMARY HYPERTENSION: ICD-10-CM

## 2024-08-26 DIAGNOSIS — I50.32 CHRONIC DIASTOLIC HEART FAILURE (MULTI): ICD-10-CM

## 2024-08-26 DIAGNOSIS — I35.1 NONRHEUMATIC AORTIC VALVE INSUFFICIENCY: ICD-10-CM

## 2024-08-26 PROCEDURE — 1159F MED LIST DOCD IN RCRD: CPT | Performed by: INTERNAL MEDICINE

## 2024-08-26 PROCEDURE — 99214 OFFICE O/P EST MOD 30 MIN: CPT | Performed by: INTERNAL MEDICINE

## 2024-08-26 PROCEDURE — 3074F SYST BP LT 130 MM HG: CPT | Performed by: INTERNAL MEDICINE

## 2024-08-26 PROCEDURE — 1123F ACP DISCUSS/DSCN MKR DOCD: CPT | Performed by: INTERNAL MEDICINE

## 2024-08-26 PROCEDURE — 1036F TOBACCO NON-USER: CPT | Performed by: INTERNAL MEDICINE

## 2024-08-26 PROCEDURE — 3078F DIAST BP <80 MM HG: CPT | Performed by: INTERNAL MEDICINE

## 2024-08-26 RX ORDER — LANCETS
EACH MISCELLANEOUS
Qty: 100 EACH | Refills: 1 | OUTPATIENT
Start: 2024-08-26

## 2024-08-26 NOTE — ASSESSMENT & PLAN NOTE
I reminded the patient of the importance of endocarditis prophylaxis prior to appropriate procedures.    Orders:    Referral to Cardiac Rehab; Future    Follow Up In Cardiology; Future

## 2024-08-26 NOTE — PATIENT INSTRUCTIONS

## 2024-08-26 NOTE — ASSESSMENT & PLAN NOTE
The patient has stage B class 2-3 chronic diastolic heart failure, and is stable on medical therapy.  Continue present medical regimen.  Discussed the importance of limiting sodium intake to 2000 mg daily, checking daily weights, and contacting us with weight gain in excess of 5 lbs in one week.    Orders:    Follow Up In Cardiology; Future

## 2024-08-26 NOTE — PROGRESS NOTES
"Chief Complaint:   Please see below.     History Of Present Illness:    Kristin Charles \"Crescencio" is a 79 y.o. female presenting with S/P TAVR.    This 79-year-old hypertensive, diabetic, hyperlipidemic woman underwent TAVR with a 26 mm Scottie Ultra prosthesis in early June 2024 for aortic stenosis and aortic regurgitation.  The patient's cath in November 2023 disclosed no significant CAD.  She has chronic diastolic heart failure with an ejection fraction that has improved to 50-55% following her TAVR.  Please see my prior notes for complete details.  She feels better since her procedure, and has noticed an iprovement in her functional capacity.  The patient denies chest discomfort, dyspnea, palpitations, orthopnea, PND, syncope, and near syncope.    Her biggest complaint is of arthralgias due to polymyalgia rheumatica, for which she follows with rheumatology.       Last Recorded Vitals:  Vitals:    08/26/24 0900   BP: 110/62   BP Location: Left arm   Patient Position: Sitting   Pulse: 64   Weight: 69.9 kg (154 lb)   Height: 1.575 m (5' 2\")       Past Medical History:  She has a past medical history of Abnormal ECG, Actinic keratosis, Acute candidiasis of vulva and vagina, Acute laryngitis (03/20/2018), Baker's cyst of knee (04/06/2023), Bitten or stung by nonvenomous insect and other nonvenomous arthropods, initial encounter (09/02/2015), Candidiasis, unspecified (10/13/2017), CHF (congestive heart failure) (Multi), Chronic ethmoidal sinusitis (11/24/2020), Chronic maxillary sinusitis (08/06/2020), Diabetes mellitus (Multi), Disease of thyroid gland, Eczema, GERD (gastroesophageal reflux disease), Hair loss (04/06/2023), Heart murmur, Heart valve disease, Laceration without foreign body of unspecified forearm, initial encounter (01/09/2022), 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), Lumbago with sciatica, left side (03/29/2019), 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), Nail disorder, unspecified (08/08/2023), Nasal turbinate hypertrophy " (04/06/2023), Neoplasm of uncertain behavior of skin (08/08/2023), Obesity (01/19/2015), Onychomycosis (04/06/2023), Open wound of hip and thigh with tendon involvement (05/24/2018), Osteoarthritis (04/06/2023), Osteoarthritis, localized, knee (04/06/2023), Overweight (BMI 25.0-29.9) (04/06/2023), Personal history of diseases of the skin and subcutaneous tissue (01/09/2022), Personal history of diseases of the skin and subcutaneous tissue (09/02/2015), Personal history of other diseases of the musculoskeletal system and connective tissue, Personal history of other diseases of the nervous system and sense organs, Personal history of other diseases of the nervous system and sense organs (09/20/2019), Personal history of other endocrine, nutritional and metabolic disease, Personal history of other infectious and parasitic diseases (09/23/2017), Personal history of other specified conditions (03/06/2020), Rash and other nonspecific skin eruption (06/15/2015), Rhinitis (10 years), Sciatica (12/11/2014), 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      Social History:  She reports that she quit smoking about 59 years ago. Her smoking use included cigarettes. She started smoking about 64 years ago. She has a 1.3 pack-year smoking history. She has never been exposed to  "tobacco smoke. She has never used smokeless tobacco. She reports current alcohol use of about 1.0 standard drink of alcohol per week. She reports that she does not use drugs.    Family History:  Family History   Problem Relation Name Age of Onset    Cancer Sister Kaila     Sudden death Daughter          Allergies:  Other, Sulfa (sulfonamide antibiotics), Miconazole, and Neomycin-bacitracin-polymyxin    Outpatient Medications:  Current Outpatient Medications   Medication Instructions    acetaminophen (TYLENOL) 650 mg, oral, Every 6 hours PRN    amoxicillin (Amoxil) 500 mg capsule Take 4 caps (2000 mg) 30-60mins prior to your dental appointment    aspirin 81 mg, oral, Daily RT    biotin 10 mg tablet 1 tablet, oral, Daily    blood sugar diagnostic (Blood Glucose Test) strip Use 1 strip 2 times a day to test blood sugar.    blood-glucose meter misc Please check blood sugars 2 times daily    celecoxib (CELEBREX) 200 mg, oral, Daily    famotidine (PEPCID) 10 mg, oral, Daily PRN    insulin degludec (TRESIBA FLEXTOUCH) 5 Units, subcutaneous, Nightly, Take as directed per insulin instructions.    Jardiance 10 mg, oral, Daily    lancets misc Please check blood sugar 2 times daily    levothyroxine (SYNTHROID, LEVOXYL) 125 mcg, oral, Daily    metoprolol succinate XL (TOPROL-XL) 25 mg, oral, Daily, Do not crush or chew.    pen needle, diabetic 31 gauge x 5/16\" needle Use as directed to inject insulin once daily    predniSONE (Deltasone) 5 mg tablet Take 1 tablet PO daily with 1 mg prednisone tablet for total of 6 mg    sacubitriL-valsartan (Entresto) 24-26 mg tablet 1 tablet, oral, Daily    traZODone (Desyrel) 50 mg tablet 1 tablet, oral, Nightly PRN       Physical Exam:  GENERAL:  pleasant 79 year-old  HEENT: No xanthelasma  NECK: Supple, no palpable adenopathy or thyromegaly  CHEST: Clear to auscultation, respiratory effort unlabored  CARDIAC: RRR, normal S1 and S2, no audible  rub, gallop, carotids are brisk, PMI is not " displaced; there is a 1/6 systolic murmur at the RSB.  ABD: Active bowel sounds, nontender, no organomegaly, no evidence of ascites  EXT: No clubbing, cyanosis, edema, or tenderness  NEURO: Awake, alert, appropriate, speech is fluent       Last Labs:  CBC -  Lab Results   Component Value Date    WBC 6.9 07/24/2024    HGB 13.8 07/24/2024    HCT 42.4 07/24/2024    MCV 94 07/24/2024     07/24/2024       CMP -  Lab Results   Component Value Date    CALCIUM 8.7 07/24/2024    PHOS 3.3 10/07/2022    PROT 5.8 (L) 05/31/2024    ALBUMIN 3.7 05/31/2024    AST 12 05/31/2024    ALT 12 05/31/2024    ALKPHOS 37 05/31/2024    BILITOT 0.6 05/31/2024       LIPID PANEL -   Lab Results   Component Value Date    CHOL 171 11/15/2023    TRIG 136 11/15/2023    HDL 53.6 11/15/2023    CHHDL 3.2 11/15/2023    LDLF 77 11/05/2021    VLDL 27 11/15/2023    NHDL 117 11/15/2023       RENAL FUNCTION PANEL -   Lab Results   Component Value Date    GLUCOSE 99 07/24/2024     07/24/2024    K 4.1 07/24/2024     07/24/2024    CO2 27 07/24/2024    ANIONGAP 11 07/24/2024    BUN 19 07/24/2024    CREATININE 0.84 07/24/2024    GFRMALE CANCELED 05/27/2023    CALCIUM 8.7 07/24/2024    PHOS 3.3 10/07/2022    ALBUMIN 3.7 05/31/2024        Lab Results   Component Value Date    HGBA1C 11.6 (A) 05/07/2024         Lab review: I have Chemistry CMP:   Lab Results   Component Value Date    ALBUMIN 3.7 05/31/2024    CALCIUM 8.7 07/24/2024    CO2 27 07/24/2024    CREATININE 0.84 07/24/2024    GLUCOSE 99 07/24/2024    BILITOT 0.6 05/31/2024    PROT 5.8 (L) 05/31/2024    ALT 12 05/31/2024    AST 12 05/31/2024    ALKPHOS 37 05/31/2024   , Chemistry BMP   Lab Results   Component Value Date    GLUCOSE 99 07/24/2024    CALCIUM 8.7 07/24/2024    CO2 27 07/24/2024    CREATININE 0.84 07/24/2024   , and CBC:  Lab Results   Component Value Date    WBC 6.9 07/24/2024    RBC 4.52 07/24/2024    HGB 13.8 07/24/2024    HCT 42.4 07/24/2024    MCV 94 07/24/2024    MCH  30.5 07/24/2024    MCHC 32.5 07/24/2024    RDW 12.6 07/24/2024    NRBC 0.0 07/24/2024     Diagnostic review: I have independently interpreted the TAVR .  My findings are as summarized in the report..    Assessment/Plan   Assessment & Plan  Nonrheumatic aortic valve stenosis    Orders:    Follow Up In Cardiology    Nonrheumatic aortic valve insufficiency    Orders:    Follow Up In Cardiology    S/P TAVR (transcatheter aortic valve replacement)  I reminded the patient of the importance of endocarditis prophylaxis prior to appropriate procedures.    Orders:    Referral to Cardiac Rehab; Future    Follow Up In Cardiology; Future    Primary hypertension    Orders:    Follow Up In Cardiology; Future    Chronic diastolic heart failure (Multi)  The patient has stage B class 2-3 chronic diastolic heart failure, and is stable on medical therapy.  Continue present medical regimen.  Discussed the importance of limiting sodium intake to 2000 mg daily, checking daily weights, and contacting us with weight gain in excess of 5 lbs in one week.    Orders:    Follow Up In Cardiology; Future          Cruz Fang MD

## 2024-08-28 ENCOUNTER — APPOINTMENT (OUTPATIENT)
Dept: PHARMACY | Facility: HOSPITAL | Age: 79
End: 2024-08-28
Payer: MEDICARE

## 2024-08-28 DIAGNOSIS — I50.22 HEART FAILURE WITH MID-RANGE EJECTION FRACTION (MULTI): Primary | ICD-10-CM

## 2024-08-28 NOTE — PROGRESS NOTES
"  Pharmacist Clinic: Cardiology Management    Kristin Charles \"Crescencio" is a 79 y.o. female was referred to Clinical Pharmacy Team for Heart Failure management.     Referring Provider: Cruz Fang MD    THIS IS A FOLLOW UP PATIENT APPOINTMENT. AT LAST VISIT ON 7/17/24 WITH PHARMACIST (Juno Klein).    Appointment was completed by Kristin \"Crescencio" who was reached at primary number.    REVIEW OF PAST APPNT (IF APPLICABLE):   Edel is currently receiving Jardiance and Entresto through [facility] with a renewal date of 4/15/25.  During last appointment she was instructed to start cutting Entresto tablets in half and to use a half tablet twice daily. Jardiance was also decreased to 10mg daily due to history of yeast infections.    Allergies   Allergen Reactions    Other Other    Sulfa (Sulfonamide Antibiotics) Unknown    Miconazole Rash    Neomycin-Bacitracin-Polymyxin Rash       Past Medical History:   Diagnosis Date    Abnormal ECG     Actinic keratosis     Acute candidiasis of vulva and vagina     Yeast infection of the vagina    Acute laryngitis 03/20/2018    Acute laryngitis    Lee's cyst of knee 04/06/2023    Bitten or stung by nonvenomous insect and other nonvenomous arthropods, initial encounter 09/02/2015    Bug bite with infection    Candidiasis, unspecified 10/13/2017    Yeast infection    CHF (congestive heart failure) (Multi)     Chronic ethmoidal sinusitis 11/24/2020    Chronic ethmoidal sinusitis    Chronic maxillary sinusitis 08/06/2020    Chronic maxillary sinusitis    Diabetes mellitus (Multi)     Disease of thyroid gland     Eczema     GERD (gastroesophageal reflux disease)     Hair loss 04/06/2023    Heart murmur     Heart valve disease     Laceration without foreign body of unspecified forearm, initial encounter 01/09/2022    Forearm laceration    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    Lumbago with sciatica, left side 03/29/2019    Chronic bilateral low back pain with left-sided sciatica    Lymphocytic colitis " 05/05/2023    Colonoscopy: 4/24/2023    Nail disorder, unspecified 08/08/2023    Nasal turbinate hypertrophy 04/06/2023    Neoplasm of uncertain behavior of skin 08/08/2023    Obesity 01/19/2015    Onychomycosis 04/06/2023    Open wound of hip and thigh with tendon involvement 05/24/2018    Osteoarthritis 04/06/2023    Osteoarthritis, localized, knee 04/06/2023    Overweight (BMI 25.0-29.9) 04/06/2023    Personal history of diseases of the skin and subcutaneous tissue 01/09/2022    History of contact dermatitis    Personal history of diseases of the skin and subcutaneous tissue 09/02/2015    History of cellulitis    Personal history of other diseases of the musculoskeletal system and connective tissue     Personal history of arthritis    Personal history of other diseases of the nervous system and sense organs     History of cataract    Personal history of other diseases of the nervous system and sense organs 09/20/2019    History of sciatica    Personal history of other endocrine, nutritional and metabolic disease     History of hypothyroidism    Personal history of other infectious and parasitic diseases 09/23/2017    History of erysipelas    Personal history of other specified conditions 03/06/2020    History of epistaxis    Rash and other nonspecific skin eruption 06/15/2015    Skin rash    Rhinitis 10 years    Sciatica 12/11/2014    Status post joint replacement 06/19/2023    Strain of muscle, fascia and tendon of the posterior muscle group at thigh level, unspecified thigh, initial encounter 05/01/2018    Hamstring strain    Tinnitus 10 years    Vaginal discharge 04/06/2023    Varus deformity, not elsewhere classified, unspecified knee 03/06/2019    Acquired genu varum       Current Outpatient Medications on File Prior to Visit   Medication Sig Dispense Refill    acetaminophen (Tylenol) 325 mg tablet Take 2 tablets (650 mg) by mouth every 6 hours if needed for moderate pain (4 - 6), mild pain (1 - 3),  "headaches or fever (temp greater than 38.0 C).      amoxicillin (Amoxil) 500 mg capsule Take 4 caps (2000 mg) 30-60mins prior to your dental appointment 4 capsule 5    aspirin 81 mg EC tablet Take 1 tablet (81 mg) by mouth once daily.      biotin 10 mg tablet Take 1 tablet (10 mg) by mouth once daily.      blood sugar diagnostic (Blood Glucose Test) strip Use 1 strip 2 times a day to test blood sugar. 50 strip 2    blood-glucose meter misc Please check blood sugars 2 times daily 1 each 0    celecoxib (CeleBREX) 200 mg capsule Take 1 capsule (200 mg) by mouth once daily.      empagliflozin (Jardiance) 10 mg Take 1 tablet (10 mg) by mouth once daily. 90 tablet 3    famotidine (Pepcid) 10 mg tablet Take 1 tablet (10 mg) by mouth once daily as needed for heartburn.      insulin degludec (Tresiba FlexTouch) 100 unit/mL (3 mL) injection Inject 5 Units under the skin once daily at bedtime. Take as directed per insulin instructions. 15 mL 12    lancets misc Please check blood sugar 2 times daily 100 each 1    levothyroxine (Synthroid, Levoxyl) 125 mcg tablet Take 1 tablet (125 mcg) by mouth once daily. 90 tablet 1    metoprolol succinate XL (Toprol-XL) 25 mg 24 hr tablet Take 1 tablet (25 mg) by mouth once daily. Do not crush or chew. 90 tablet 3    pen needle, diabetic 31 gauge x 5/16\" needle Use as directed to inject insulin once daily 100 each 11    predniSONE (Deltasone) 5 mg tablet Take 1 tablet PO daily with 1 mg prednisone tablet for total of 6 mg 30 tablet 1    sacubitriL-valsartan (Entresto) 24-26 mg tablet Take 1 tablet by mouth once daily. (Patient taking differently: Take 0.5 tablets by mouth 2 times a day.) 90 tablet 3    traZODone (Desyrel) 50 mg tablet Take 1 tablet (50 mg) by mouth as needed at bedtime.      [DISCONTINUED] insulin degludec (Tresiba FlexTouch) 100 unit/mL (3 mL) injection Inject 5 Units under the skin once daily at bedtime. Take as directed per insulin instructions. 15 mL 3    [DISCONTINUED] " "insulin degludec (Tresiba FlexTouch) 100 unit/mL (3 mL) injection Inject 5 Units under the skin once daily at bedtime. Take as directed per insulin instructions. 15 mL 3    [DISCONTINUED] insulin degludec (Tresiba FlexTouch) 100 unit/mL (3 mL) injection Inject 5 Units under the skin once daily at bedtime. Take as directed per insulin instructions. 15 mL 3    [DISCONTINUED] predniSONE (Deltasone) 5 mg tablet Take 1 tablet (5 mg) by mouth once daily. 90 tablet 0    [DISCONTINUED] predniSONE (Deltasone) 5 mg tablet Take 1 tablet (5 mg) by mouth once daily. 30 tablet 1     No current facility-administered medications on file prior to visit.         RELEVANT LAB RESULTS  Lab Results   Component Value Date    BILITOT 0.6 05/31/2024    CALCIUM 8.7 07/24/2024    CO2 27 07/24/2024     07/24/2024    CREATININE 0.84 07/24/2024    GLUCOSE 99 07/24/2024    ALKPHOS 37 05/31/2024    K 4.1 07/24/2024    PROT 5.8 (L) 05/31/2024     07/24/2024    AST 12 05/31/2024    ALT 12 05/31/2024    BUN 19 07/24/2024    ANIONGAP 11 07/24/2024    MG 2.03 06/18/2024    PHOS 3.3 10/07/2022    ALBUMIN 3.7 05/31/2024    GFRF CANCELED 05/27/2023    GFRMALE CANCELED 05/27/2023     Lab Results   Component Value Date    TRIG 136 11/15/2023    CHOL 171 11/15/2023    LDLCALC 90 11/15/2023    HDL 53.6 11/15/2023     No results found for: \"BMCBC\", \"CBCDIF\"     PHARMACEUTICAL ASSESSMENT      Medication Documentation Review Audit       Reviewed by Charlene Ibanez LPN (Licensed Nurse) on 08/26/24 at 1015      Medication Order Taking? Sig Documenting Provider Last Dose Status   acetaminophen (Tylenol) 325 mg tablet [id number] Yes Take 2 tablets (650 mg) by mouth every 6 hours if needed for moderate pain (4 - 6), mild pain (1 - 3), headaches or fever (temp greater than 38.0 C). Travis Christensen, APRN-CNP Taking Active   amoxicillin (Amoxil) 500 mg capsule [id number] Yes Take 4 caps (2000 mg) 30-60mins prior to your dental appointment Travis Christensen, " "APRN-CNP Taking Active   aspirin 81 mg EC tablet [id number] Yes Take 1 tablet (81 mg) by mouth once daily. Historical Provider, MD Taking Active   biotin 10 mg tablet [id number] Yes Take 1 tablet (10 mg) by mouth once daily. Historical Provider, MD Taking Active   blood sugar diagnostic (Blood Glucose Test) strip [id number] Yes Use 1 strip 2 times a day to test blood sugar. Melanie Muñoz, DO Taking Active   blood-glucose meter misc [id number] Yes Please check blood sugars 2 times daily Melanie Muñoz DO Taking Active   celecoxib (CeleBREX) 200 mg capsule [id number] Yes Take 1 capsule (200 mg) by mouth once daily. Historical Provider, MD Taking Active   empagliflozin (Jardiance) 10 mg [id number] Yes Take 1 tablet (10 mg) by mouth once daily. Cruz Fang MD Taking Active   famotidine (Pepcid) 10 mg tablet [id number] Yes Take 1 tablet (10 mg) by mouth once daily as needed for heartburn. Historical Provider, MD Taking Active     Discontinued 08/23/24 0917   insulin degludec (Tresiba FlexTouch) 100 unit/mL (3 mL) injection [id number] Yes Inject 5 Units under the skin once daily at bedtime. Take as directed per insulin instructions. Melanie Muñoz DO Taking Active   lancets misc [id number] Yes Please check blood sugar 2 times daily Melanie Muñoz DO Taking Active   levothyroxine (Synthroid, Levoxyl) 125 mcg tablet [id number] Yes Take 1 tablet (125 mcg) by mouth once daily. Melanie Muñoz DO Taking Active   metoprolol succinate XL (Toprol-XL) 25 mg 24 hr tablet [id number] Yes Take 1 tablet (25 mg) by mouth once daily. Do not crush or chew. Cruz Fang MD Taking Active   pen needle, diabetic 31 gauge x 5/16\" needle [id number] Yes Use as directed to inject insulin once daily Melanie Muñoz DO Taking Active     Discontinued 08/23/24 0917     Discontinued 08/23/24 0918   predniSONE (Deltasone) 5 mg tablet [id number] Yes Take 1 tablet PO daily with 1 mg prednisone tablet for total of 6 mg Melanie Muñoz DO " Taking Active   sacubitriL-valsartan (Entresto) 24-26 mg tablet [id number] Yes Take 1 tablet by mouth once daily.   Patient taking differently: Take 0.5 tablets by mouth 2 times a day.    Cruz Fang MD Taking Active   traZODone (Desyrel) 50 mg tablet [id number] Yes Take 1 tablet (50 mg) by mouth as needed at bedtime. Historical Provider, MD Taking Active                    DISEASE MANAGEMENT ASSESSMENT:     CHF ASSESSMENT     Symptom/Staging:  -Most recent ejection fraction: 50-55%  -NYHA Stage: I    Results for orders placed during the hospital encounter of 07/22/24    Transthoracic echo (TTE) complete    Narrative  [facility], [address] [place] [address]  Tel [phone] and Fax [phone]    TRANSTHORACIC ECHOCARDIOGRAM REPORT      Patient Name:      CHEL MCKEON     Reading Physician:    [id number] Raul Trivedi MD  Study Date:        7/22/2024            Ordering Provider:    [id number] MARSHA VILLAREAL  MRN/PID:           [id number]             Fellow:  Accession#:        [id number]         Nurse:  Date of Birth/Age: 1945 / 79 years Sonographer:          Rj Sharma  [facility]  Gender:            F                    Additional Staff:  Height:            154.94 cm            Admit Date:  Weight:            68.95 kg             Admission Status:     Outpatient  BSA / BMI:         1.68 m2 / 28.72      Encounter#:           [id number]  kg/m2  Blood Pressure:    144/62 mmHg          Department Location:  [facility]    Study Type:    TRANSTHORACIC ECHO (TTE) COMPLETE  Diagnosis/ICD: Presence of prosthetic heart valve-Z95.2; Nonrheumatic aortic  (valve) stenosis-I35.0  Indication:    AS, s/p TAVR (26mm Haile) on 6/17/2024  CPT Code:      Echo Complete w Full Doppler-41967    Patient History:  Pertinent History: LAYTON, AS, s/p TAVR (26mm Haile) on 6/17/2024, CKD, GERD,  HTN.    Study Detail: The following Echo studies were performed: 2D, M-Mode, Doppler and  color flow.  Technically challenging study due to the patient's  lack of cooperation, body habitus and active neuralgia pain.      PHYSICIAN INTERPRETATION:  Left Ventricle: The left ventricular systolic function is low normal, with a visually estimated ejection fraction of 50-55%. There are no regional wall motion abnormalities. The left ventricular cavity size is normal. Spectral Doppler shows an impaired relaxation pattern of left ventricular diastolic filling.  Left Atrium: The left atrium is mildly dilated.  Right Ventricle: The right ventricle is normal in size. There is normal right ventricular global systolic function.  Right Atrium: The right atrium is normal in size.  Aortic Valve: There is a prosthetic aortic valve present. The aortic valve dimensionless index is 0.23. There is no evidence of aortic valve regurgitation. The peak instantaneous gradient of the aortic valve is 25.4 mmHg. The mean gradient of the aortic valve is 14.5 mmHg.  Mitral Valve: The mitral valve is normal in structure. There is mild to moderate mitral valve regurgitation.  Tricuspid Valve: The tricuspid valve is structurally normal. There is mild tricuspid regurgitation.  Pulmonic Valve: The pulmonic valve is not well visualized. There is no indication of pulmonic valve regurgitation.  Pericardium: There is no pericardial effusion noted.  Aorta: The aortic root is normal.  Systemic Veins: The inferior vena cava was not well visualized.  In comparison to the previous echocardiogram(s): No significant changes compared to prior cardiogram dated 6/18/2024.      CONCLUSIONS:  1. The left ventricular systolic function is low normal, with a visually estimated ejection fraction of 50-55%.  2. Spectral Doppler shows an impaired relaxation pattern of left ventricular diastolic filling.  3. There is normal right ventricular global systolic function.  4. The left atrium is mildly dilated.  5. Mild to moderate mitral valve regurgitation.  6. Hemodynamics are  consistent with normal functioning of the bioprosthetic TAVR valve.    QUANTITATIVE DATA SUMMARY:  2D MEASUREMENTS:  Normal Ranges:  LAs:           4.26 cm   (2.7-4.0cm)  IVSd:          1.09 cm   (0.6-1.1cm)  LVPWd:         1.13 cm   (0.6-1.1cm)  LVIDd:         4.18 cm   (3.9-5.9cm)  LVIDs:         2.69 cm  LV Mass Index: 94.3 g/m2  LV % FS        35.5 %    LA VOLUME:  Normal Ranges:  LA Vol A4C:        54.9 ml    (22+/-6mL/m2)  LA Vol A2C:        63.0 ml  LA Vol BP:         59.3 ml  LA Vol Index A4C:  32.7 ml/m2  LA Vol Index A2C:  37.5 ml/m2  LA Vol Index BP:   35.3 ml/m2  LA Area A4C:       18.5 cm2  LA Area A2C:       20.0 cm2  LA Major Axis A4C: 5.3 cm  LA Major Axis A2C: 5.4 cm  LA Vol A4C:        51.9 ml  LA Vol A2C:        59.4 ml    AORTA MEASUREMENTS:  Normal Ranges:  Asc Ao, d: 3.60 cm (2.1-3.4cm)    LV SYSTOLIC FUNCTION BY 2D PLANIMETRY (MOD):  Normal Ranges:  EF-A4C View:    49 % (>=55%)  EF-A2C View:    65 %  EF-Visual:      53 %  LV EF Reported: 53 %    LV DIASTOLIC FUNCTION:  Normal Ranges:  MV Peak E:        0.68 m/s    (0.7-1.2 m/s)  MV Peak A:        1.15 m/s    (0.42-0.7 m/s)  E/A Ratio:        0.59        (1.0-2.2)  MV e'             0.090 m/s   (>8.0)  MV lateral e'     0.12 m/s  MV medial e'      0.06 m/s  MV A Dur:         211.07 msec  E/e' Ratio:       7.53        (<8.0)  PulmV Sys Elias:    49.38 cm/s  PulmV Vargas Elias:   27.00 cm/s  PulmV S/D Elias:    1.83  PulmV A Revs Elias: 26.16 cm/s    MITRAL VALVE:  Normal Ranges:  MV DT: 349 msec (150-240msec)    AORTIC VALVE:  Normal Ranges:  AoV Vmax:                2.52 m/s  (<=1.7m/s)  AoV Peak P.4 mmHg (<20mmHg)  AoV Mean P.5 mmHg (1.7-11.5mmHg)  LVOT Max Elias:            0.72 m/s  (<=1.1m/s)  AoV VTI:                 67.61 cm  (18-25cm)  LVOT VTI:                15.65 cm  AoV Dimensionless Index: 0.23      RIGHT VENTRICLE:  RV Basal 3.30 cm  RV Mid   2.30 cm  RV Major 6.5 cm  TAPSE:   20.0 mm  RV s'    0.19  m/s    TRICUSPID VALVE/RVSP:  Normal Ranges:  Peak TR Velocity: 2.36 m/s  RV Syst Pressure: 25.3 mmHg (< 30mmHg)  IVC Diam:         1.50 cm    PULMONIC VALVE:  Normal Ranges:  PV Max Elias: 0.9 m/s  (0.6-0.9m/s)  PV Max PG:  3.1 mmHg    Pulmonary Veins:  PulmV A Revs Elias: 26.16 cm/s  PulmV Vargas Elias:   27.00 cm/s  PulmV S/D Elias:    1.83  PulmV Sys Elias:    49.38 cm/s      [id number] Raul Trivedi MD  Electronically signed on 7/22/2024 at 11:26:02 AM        ** Final **      Guideline-Directed Medical Therapy:  -ARNI: Yes, describe: Entresto 12-13mg BID  -Beta Blocker: Yes, describe: metoprolol succinate 25mg daily  -MRA: No  -SGLT2i: Yes, describe: Jardiance 10mg daily    Secondary Prevention:  -The ASCVD Risk score (Jean-Paul BEY, et al., 2019) failed to calculate for the following reasons:    The 2019 ASCVD risk score is only valid for ages 40 to 79   -Aspirin 81mg? yes  -Statin?: No  -HTN?: Yes, describe: controlled    CURRENT PHARMACOTHERAPY:   Entresto 24-26mg 0.5 tablet BID  Jardiance 10mg daily  Metoprolol succinate 25mg daily  eGFR 70ml/min  CrCl 42ml/min    Affordability: RUST  Adherence/Compliance: reports adherence  Adverse Effects: history of yeast infection with Jardiance    Monitoring Weights at Home: Yes  Home Weight Recordings: 152-154lbs    Past In Office Weight Readings:   Wt Readings from Last 6 Encounters:   08/26/24 69.9 kg (154 lb)   08/13/24 69.9 kg (154 lb)   07/23/24 70.3 kg (155 lb)   07/02/24 70.3 kg (155 lb)   06/18/24 70.5 kg (155 lb 6.8 oz)   06/11/24 70.3 kg (155 lb)       Monitoring Blood Pressure at Home: Yes  Home BP Recordings: working to get a new blood pressure cuff because it is not functioning properly.    Past In Office BP Readings:   BP Readings from Last 6 Encounters:   08/26/24 110/62   08/13/24 124/80   07/23/24 134/82   07/02/24 126/74   06/18/24 142/66   06/11/24 116/60       HEALTH MANAGEMENT    Maintaining fluid restriction (<2 L/day): N/A  Edema/swelling: No  Shortness of  breath: No  Trouble sleeping/lying down: No  Dry/hacking cough: No  Recent Hospitalizations: No    EDUCATION/COUNSELING:   - Counseled patient on MOA, expectations, duration of therapy, contraindications, administration, and monitoring parameters  - Counseled patient on lifestyle modifications that can decrease your risk of having complications (smoking cessation, losing weight, daily weights, vaccines)  - Counseled patient on fluid intake and weight management. Recommended to not consume more than 2 liters of fliuids per day. If they have gained more than 2-3 pounds within a 24 hours period (or 5 pounds in a week), contact their cardiologist  - Answered all patient questions and concerns       DISCUSSION/NOTES:   Patient reports she is doing well with her heart failure medications. States that she has not had any issues cutting Entresto tablets, and was able to start Jardiance 10mg tablets without concern for worsening yeast infections.  Understands to reach out to Dr Melanie Muñoz if any were to occur.  Plan for her to get a new blood pressure cuff. She reports she is working with the  to send her a replacement. Continue current medication regimen at today's visit assess for medication titrations at next visit.    ASSESSMENT AND PLAN:    Assessment/Plan   Problem List Items Addressed This Visit       Heart failure with mid-range ejection fraction (Multi) - Primary     Patient currently on three GDMT medications and blood pressure has been stable. HFpEF patient who is not symptomatic additional medications not needed at this time.  No dose adjustments needed based on patient's kidney and liver function.              RECOMMENDATIONS/PLAN    CONTINUE  Entresto 24-26mg 0.5 tablet BID  Jardiance 10mg daily  Metoprolol succinate 25mg daily    Next Cardiology Appointment: 8/29/25  Clinical Pharmacist follow up: 11/27/24  VAF/Application Expiration: Yes    Date: 4/15/25  Type of Encounter: Zaida CAMPOS  Milvia Klein    Verbal consent to manage patient's drug therapy was obtained from the patient . They were informed they may decline to participate or withdraw from participation in pharmacy services at any time.    Continue all meds under the continuation of care with the referring provider and clinical pharmacy team.

## 2024-08-28 NOTE — ASSESSMENT & PLAN NOTE
Patient currently on three GDMT medications and blood pressure has been stable. HFpEF patient who is not symptomatic additional medications not needed at this time.  No dose adjustments needed based on patient's kidney and liver function.

## 2024-08-28 NOTE — Clinical Note
Edel had no issues cutting Entresto tablet and reports no yeast infections with Jardiance since decreasing dose. Plan for her to get a new blood pressure cuff. She reports she is working with the  to send her a replacement. Continue current medication regimen at today's visit assess for medication titrations at next visit.

## 2024-08-30 ENCOUNTER — LAB (OUTPATIENT)
Dept: LAB | Facility: LAB | Age: 79
End: 2024-08-30
Payer: MEDICARE

## 2024-08-30 DIAGNOSIS — M35.3 PMR (POLYMYALGIA RHEUMATICA) (MULTI): ICD-10-CM

## 2024-08-30 LAB
ALBUMIN SERPL BCP-MCNC: 3.4 G/DL (ref 3.4–5)
ALP SERPL-CCNC: 44 U/L (ref 33–136)
ALT SERPL W P-5'-P-CCNC: 11 U/L (ref 7–45)
ANION GAP SERPL CALC-SCNC: 11 MMOL/L (ref 10–20)
AST SERPL W P-5'-P-CCNC: 13 U/L (ref 9–39)
BASOPHILS # BLD AUTO: 0.07 X10*3/UL (ref 0–0.1)
BASOPHILS NFR BLD AUTO: 1.1 %
BILIRUB SERPL-MCNC: 0.5 MG/DL (ref 0–1.2)
BUN SERPL-MCNC: 22 MG/DL (ref 6–23)
CALCIUM SERPL-MCNC: 9.1 MG/DL (ref 8.6–10.6)
CHLORIDE SERPL-SCNC: 105 MMOL/L (ref 98–107)
CO2 SERPL-SCNC: 29 MMOL/L (ref 21–32)
CREAT SERPL-MCNC: 0.96 MG/DL (ref 0.5–1.05)
CRP SERPL-MCNC: 0.97 MG/DL
EGFRCR SERPLBLD CKD-EPI 2021: 60 ML/MIN/1.73M*2
EOSINOPHIL # BLD AUTO: 0.28 X10*3/UL (ref 0–0.4)
EOSINOPHIL NFR BLD AUTO: 4.2 %
ERYTHROCYTE [DISTWIDTH] IN BLOOD BY AUTOMATED COUNT: 12.9 % (ref 11.5–14.5)
ERYTHROCYTE [SEDIMENTATION RATE] IN BLOOD BY WESTERGREN METHOD: 13 MM/H (ref 0–30)
GLUCOSE SERPL-MCNC: 122 MG/DL (ref 74–99)
HCT VFR BLD AUTO: 43.2 % (ref 36–46)
HGB BLD-MCNC: 13.8 G/DL (ref 12–16)
IMM GRANULOCYTES # BLD AUTO: 0.03 X10*3/UL (ref 0–0.5)
IMM GRANULOCYTES NFR BLD AUTO: 0.5 % (ref 0–0.9)
LYMPHOCYTES # BLD AUTO: 2.06 X10*3/UL (ref 0.8–3)
LYMPHOCYTES NFR BLD AUTO: 31.2 %
MCH RBC QN AUTO: 29.9 PG (ref 26–34)
MCHC RBC AUTO-ENTMCNC: 31.9 G/DL (ref 32–36)
MCV RBC AUTO: 94 FL (ref 80–100)
MONOCYTES # BLD AUTO: 0.71 X10*3/UL (ref 0.05–0.8)
MONOCYTES NFR BLD AUTO: 10.7 %
NEUTROPHILS # BLD AUTO: 3.46 X10*3/UL (ref 1.6–5.5)
NEUTROPHILS NFR BLD AUTO: 52.3 %
NRBC BLD-RTO: 0 /100 WBCS (ref 0–0)
PLATELET # BLD AUTO: 179 X10*3/UL (ref 150–450)
POTASSIUM SERPL-SCNC: 3.9 MMOL/L (ref 3.5–5.3)
PROT SERPL-MCNC: 6 G/DL (ref 6.4–8.2)
RBC # BLD AUTO: 4.61 X10*6/UL (ref 4–5.2)
SODIUM SERPL-SCNC: 141 MMOL/L (ref 136–145)
WBC # BLD AUTO: 6.6 X10*3/UL (ref 4.4–11.3)

## 2024-08-30 PROCEDURE — RXMED WILLOW AMBULATORY MEDICATION CHARGE

## 2024-08-30 PROCEDURE — 86140 C-REACTIVE PROTEIN: CPT

## 2024-08-30 PROCEDURE — 80053 COMPREHEN METABOLIC PANEL: CPT

## 2024-08-30 PROCEDURE — 85025 COMPLETE CBC W/AUTO DIFF WBC: CPT

## 2024-08-30 PROCEDURE — 85652 RBC SED RATE AUTOMATED: CPT

## 2024-08-30 PROCEDURE — 36415 COLL VENOUS BLD VENIPUNCTURE: CPT

## 2024-09-03 ENCOUNTER — PHARMACY VISIT (OUTPATIENT)
Dept: PHARMACY | Facility: CLINIC | Age: 79
End: 2024-09-03
Payer: COMMERCIAL

## 2024-09-04 NOTE — PROGRESS NOTES
"Rheumatology New Outpatient  Note    Subjective   Kristin Charles \"Crescencio" is a 79 y.o. female presenting today for Joint Pain.    History of Presenting Problem:     Rheum history: started having shoulder/hip pain and stiffness around 1/2024 and her CRP was elevated. She was initially given a medrol adam. She continued to have symptoms and in 3/15/2024 and she was started on 15 mg daily. After 10 days she self reduced to 10 mg for 2 weeks then she was instructed to go back to 15 mg. She is overall feeling better but continues to have more pain and stiffness in the morning. No headache, jaw pain or scalp tenderness.    She is feeling well on 6 mg . No shoulder pain or stiffness.  She can't tolerate 4 mg she tried 3 times and she will have more pain and stiffness in hands, shoulders, hips and more stiffness.     Past Medical History:   Past Medical History:   Diagnosis Date    Abnormal ECG     Actinic keratosis     Acute candidiasis of vulva and vagina     Yeast infection of the vagina    Acute laryngitis 03/20/2018    Acute laryngitis    Lee's cyst of knee 04/06/2023    Bitten or stung by nonvenomous insect and other nonvenomous arthropods, initial encounter 09/02/2015    Bug bite with infection    Candidiasis, unspecified 10/13/2017    Yeast infection    CHF (congestive heart failure) (Multi)     Chronic ethmoidal sinusitis 11/24/2020    Chronic ethmoidal sinusitis    Chronic maxillary sinusitis 08/06/2020    Chronic maxillary sinusitis    Diabetes mellitus (Multi)     Disease of thyroid gland     Eczema     GERD (gastroesophageal reflux disease)     Hair loss 04/06/2023    Heart murmur     Heart valve disease     Laceration without foreign body of unspecified forearm, initial encounter 01/09/2022    Forearm laceration    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    Lumbago with sciatica, left side 03/29/2019    Chronic bilateral low back pain with left-sided sciatica    Lymphocytic colitis 05/05/2023    Colonoscopy: 4/24/2023    " Nail disorder, unspecified 08/08/2023    Nasal turbinate hypertrophy 04/06/2023    Neoplasm of uncertain behavior of skin 08/08/2023    Obesity 01/19/2015    Onychomycosis 04/06/2023    Open wound of hip and thigh with tendon involvement 05/24/2018    Osteoarthritis 04/06/2023    Osteoarthritis, localized, knee 04/06/2023    Overweight (BMI 25.0-29.9) 04/06/2023    Personal history of diseases of the skin and subcutaneous tissue 01/09/2022    History of contact dermatitis    Personal history of diseases of the skin and subcutaneous tissue 09/02/2015    History of cellulitis    Personal history of other diseases of the musculoskeletal system and connective tissue     Personal history of arthritis    Personal history of other diseases of the nervous system and sense organs     History of cataract    Personal history of other diseases of the nervous system and sense organs 09/20/2019    History of sciatica    Personal history of other endocrine, nutritional and metabolic disease     History of hypothyroidism    Personal history of other infectious and parasitic diseases 09/23/2017    History of erysipelas    Personal history of other specified conditions 03/06/2020    History of epistaxis    Rash and other nonspecific skin eruption 06/15/2015    Skin rash    Rhinitis 10 years    Sciatica 12/11/2014    Status post joint replacement 06/19/2023    Strain of muscle, fascia and tendon of the posterior muscle group at thigh level, unspecified thigh, initial encounter 05/01/2018    Hamstring strain    Tinnitus 10 years    Vaginal discharge 04/06/2023    Varus deformity, not elsewhere classified, unspecified knee 03/06/2019    Acquired genu varum       Allergies:   Allergies   Allergen Reactions    Other Other    Sulfa (Sulfonamide Antibiotics) Unknown    Miconazole Rash    Neomycin-Bacitracin-Polymyxin Rash       Medications:   Current Outpatient Medications:     acetaminophen (Tylenol) 325 mg tablet, Take 2 tablets (650 mg)  "by mouth every 6 hours if needed for moderate pain (4 - 6), mild pain (1 - 3), headaches or fever (temp greater than 38.0 C)., Disp: , Rfl:     amoxicillin (Amoxil) 500 mg capsule, Take 4 caps (2000 mg) 30-60mins prior to your dental appointment, Disp: 4 capsule, Rfl: 5    aspirin 81 mg EC tablet, Take 1 tablet (81 mg) by mouth once daily., Disp: , Rfl:     biotin 10 mg tablet, Take 1 tablet (10 mg) by mouth once daily., Disp: , Rfl:     blood sugar diagnostic (Blood Glucose Test) strip, Use 1 strip 2 times a day to test blood sugar., Disp: 50 strip, Rfl: 2    blood-glucose meter misc, Please check blood sugars 2 times daily, Disp: 1 each, Rfl: 0    celecoxib (CeleBREX) 200 mg capsule, Take 1 capsule (200 mg) by mouth once daily., Disp: , Rfl:     empagliflozin (Jardiance) 10 mg, Take 1 tablet (10 mg) by mouth once daily., Disp: 90 tablet, Rfl: 3    famotidine (Pepcid) 10 mg tablet, Take 1 tablet (10 mg) by mouth once daily as needed for heartburn., Disp: , Rfl:     insulin degludec (Tresiba FlexTouch) 100 unit/mL (3 mL) injection, Inject 5 Units under the skin once daily at bedtime. Take as directed per insulin instructions., Disp: 15 mL, Rfl: 12    lancets misc, Please check blood sugar 2 times daily, Disp: 100 each, Rfl: 1    levothyroxine (Synthroid, Levoxyl) 125 mcg tablet, Take 1 tablet (125 mcg) by mouth once daily., Disp: 90 tablet, Rfl: 1    metoprolol succinate XL (Toprol-XL) 25 mg 24 hr tablet, Take 1 tablet (25 mg) by mouth once daily. Do not crush or chew., Disp: 90 tablet, Rfl: 3    pen needle, diabetic 31 gauge x 5/16\" needle, Use as directed to inject insulin once daily, Disp: 100 each, Rfl: 11    predniSONE (Deltasone) 5 mg tablet, Take 1 tablet PO daily with 1 mg prednisone tablet for total of 6 mg, Disp: 30 tablet, Rfl: 1    sacubitriL-valsartan (Entresto) 24-26 mg tablet, Take 1 tablet by mouth once daily. (Patient taking differently: Take 0.5 tablets by mouth 2 times a day.), Disp: 90 tablet, " "Rfl: 3    traZODone (Desyrel) 50 mg tablet, Take 1 tablet (50 mg) by mouth as needed at bedtime., Disp: , Rfl:     Review of Systems:   Constitutional: Denies fever, chills   Eyes: Denies dry eyes, pain in the eyes   ENT: Denies dry mouth, loss of taste, sores in the mouth  Cardiovascular: Denies chest pain, palpitations   Respiratory: Denies shortness of breath   Gastrointestinal: Denies heartburn   Integumentary: Denies photosensitivity, rash or lesions, Raynaud's   Neurological: Denies any numbness or tingling    MSK: As per HPI.     All 10 review of systems have been reviewed and are negative for complaint except as noted in the HPI    Objective   Physical Examination:  Vitals:    09/05/24 1324   BP: 102/69   Pulse: 67   Temp: 36.3 °C (97.3 °F)         Growth %ile SmartLinks can only be used for patients less than 20 years old.  Ht Readings from Last 1 Encounters:   09/05/24 1.575 m (5' 2\")     Wt Readings from Last 1 Encounters:   09/05/24 69.9 kg (154 lb)       General - NAD, sitting up in chair, well-groomed, pleasant, AAOx3  Head: Normocephalic, atraumatic  Eyes - PERRLA, EOMI. No conjunctiva injection.   Cardiovascular - No murmurs or rubs.  Lungs - Symmetric chest expansion.   Skin - No rashes or ulcers. Skin warm and dry. No erythema on bilateral cheeks.  Extremities - No edema, cyanosis ,or clubbing  Neurological - Alert and oriented x 3,  grossly intact. No focal deficit.          Laboratory Testing:  3/2024: CMP normal, CRP 1.08, ESR 10, CBC normal, RF-, CCP-  2/2024: CRP 1.25, ESR 26      Assessment/Plan   Kristin Charles \"Edel\" is a 79 y.o. female with PMHx of PVD, aortic stenosis, HFrEF, HTN, allergic rhinitis, DM, Hypothyroidism, GERD CKD, Anxiety, OA, and lumbar radiculopathy who is presenting today as a Follow up for PMR    ##Polymyalgia rheumatica (PMR):   -Diagnosed by PCP, in ~2/2024. Shoulder/hip pain/stiffness and elevated CRP  and had prompt response to  steroids.   -Current dose of " prednisone: 5 mg daily  -Symptoms: Improved but cannot tolerate below 5 mg  -Steroid plan: stay on 5 mg for now, discussed risks and benefits and considering Petey. She opted to stay on 5 mg for now and reevaluate in 3-4 months  -Most recent ESR/CRP 8/2024 normal  -GCA symptoms: no headache, jaw pain or claudications. No new vision changes. Patient is aware and advised to go to ED if he experiences any GCA symptoms.  -Take 2000 units vitamin D and calcium supplements  -DEXA scan ordered      The assessment and plan, risk and benefits were discussed with the patient. All of the patients questions were answered and patient agrees to the plan.      Mellissa Webster MD  Clinical   Department of Rheumatology   [facility]

## 2024-09-05 ENCOUNTER — APPOINTMENT (OUTPATIENT)
Dept: RHEUMATOLOGY | Facility: CLINIC | Age: 79
End: 2024-09-05
Payer: MEDICARE

## 2024-09-05 VITALS
HEIGHT: 62 IN | DIASTOLIC BLOOD PRESSURE: 69 MMHG | BODY MASS INDEX: 28.34 KG/M2 | TEMPERATURE: 97.3 F | WEIGHT: 154 LBS | HEART RATE: 67 BPM | SYSTOLIC BLOOD PRESSURE: 102 MMHG

## 2024-09-05 DIAGNOSIS — M35.3 PMR (POLYMYALGIA RHEUMATICA) (MULTI): Primary | ICD-10-CM

## 2024-09-05 DIAGNOSIS — Z78.0 OSTEOPENIA AFTER MENOPAUSE: ICD-10-CM

## 2024-09-05 DIAGNOSIS — Z79.52 CURRENT CHRONIC USE OF SYSTEMIC STEROIDS: ICD-10-CM

## 2024-09-05 DIAGNOSIS — Z13.820 SCREENING FOR OSTEOPOROSIS: ICD-10-CM

## 2024-09-05 DIAGNOSIS — E55.9 VITAMIN D INSUFFICIENCY: ICD-10-CM

## 2024-09-05 DIAGNOSIS — M85.80 OSTEOPENIA AFTER MENOPAUSE: ICD-10-CM

## 2024-09-05 PROCEDURE — 1160F RVW MEDS BY RX/DR IN RCRD: CPT | Performed by: STUDENT IN AN ORGANIZED HEALTH CARE EDUCATION/TRAINING PROGRAM

## 2024-09-05 PROCEDURE — 1123F ACP DISCUSS/DSCN MKR DOCD: CPT | Performed by: STUDENT IN AN ORGANIZED HEALTH CARE EDUCATION/TRAINING PROGRAM

## 2024-09-05 PROCEDURE — 1036F TOBACCO NON-USER: CPT | Performed by: STUDENT IN AN ORGANIZED HEALTH CARE EDUCATION/TRAINING PROGRAM

## 2024-09-05 PROCEDURE — 3078F DIAST BP <80 MM HG: CPT | Performed by: STUDENT IN AN ORGANIZED HEALTH CARE EDUCATION/TRAINING PROGRAM

## 2024-09-05 PROCEDURE — 99214 OFFICE O/P EST MOD 30 MIN: CPT | Performed by: STUDENT IN AN ORGANIZED HEALTH CARE EDUCATION/TRAINING PROGRAM

## 2024-09-05 PROCEDURE — 1159F MED LIST DOCD IN RCRD: CPT | Performed by: STUDENT IN AN ORGANIZED HEALTH CARE EDUCATION/TRAINING PROGRAM

## 2024-09-05 PROCEDURE — 3074F SYST BP LT 130 MM HG: CPT | Performed by: STUDENT IN AN ORGANIZED HEALTH CARE EDUCATION/TRAINING PROGRAM

## 2024-09-05 RX ORDER — PREDNISONE 5 MG/1
TABLET ORAL
Qty: 30 TABLET | Refills: 2 | Status: SHIPPED | OUTPATIENT
Start: 2024-09-05

## 2024-09-05 RX ORDER — PREDNISONE 1 MG/1
4 TABLET ORAL DAILY
Qty: 120 TABLET | Refills: 1 | Status: SHIPPED | OUTPATIENT
Start: 2024-09-05

## 2024-09-05 RX ORDER — PREDNISONE 5 MG/1
TABLET ORAL
Qty: 30 TABLET | Refills: 2 | Status: SHIPPED
Start: 2024-09-05 | End: 2024-09-05

## 2024-09-10 ENCOUNTER — APPOINTMENT (OUTPATIENT)
Dept: CARDIAC REHAB | Facility: CLINIC | Age: 79
End: 2024-09-10
Payer: MEDICARE

## 2024-09-16 ENCOUNTER — HOSPITAL ENCOUNTER (OUTPATIENT)
Dept: RADIOLOGY | Facility: CLINIC | Age: 79
Discharge: HOME | End: 2024-09-16
Payer: MEDICARE

## 2024-09-16 DIAGNOSIS — Z13.820 SCREENING FOR OSTEOPOROSIS: ICD-10-CM

## 2024-09-16 DIAGNOSIS — M85.80 OSTEOPENIA AFTER MENOPAUSE: ICD-10-CM

## 2024-09-16 DIAGNOSIS — Z78.0 OSTEOPENIA AFTER MENOPAUSE: ICD-10-CM

## 2024-09-16 PROCEDURE — 77080 DXA BONE DENSITY AXIAL: CPT | Performed by: RADIOLOGY

## 2024-09-16 PROCEDURE — 77080 DXA BONE DENSITY AXIAL: CPT

## 2024-09-17 ENCOUNTER — CLINICAL SUPPORT (OUTPATIENT)
Dept: CARDIAC REHAB | Facility: CLINIC | Age: 79
End: 2024-09-17
Payer: MEDICARE

## 2024-09-17 DIAGNOSIS — Z95.2 S/P TAVR (TRANSCATHETER AORTIC VALVE REPLACEMENT): ICD-10-CM

## 2024-09-18 PROCEDURE — RXMED WILLOW AMBULATORY MEDICATION CHARGE

## 2024-09-18 NOTE — PROGRESS NOTES
Pt arrived for orientation and explained that she cannot do the program due to polymyalgia rheumatica chronic pain and in the process of moving.  Pt stated she felt she needed to come in person to explain why she will not be participating in Phase 2.

## 2024-09-20 ENCOUNTER — PATIENT MESSAGE (OUTPATIENT)
Dept: PRIMARY CARE | Facility: CLINIC | Age: 79
End: 2024-09-20
Payer: MEDICARE

## 2024-09-20 DIAGNOSIS — B37.9 YEAST INFECTION: Primary | ICD-10-CM

## 2024-09-20 RX ORDER — FLUCONAZOLE 150 MG/1
150 TABLET ORAL ONCE
Qty: 1 TABLET | Refills: 0 | Status: SHIPPED | OUTPATIENT
Start: 2024-09-20 | End: 2024-09-20

## 2024-09-21 ENCOUNTER — PHARMACY VISIT (OUTPATIENT)
Dept: PHARMACY | Facility: CLINIC | Age: 79
End: 2024-09-21
Payer: COMMERCIAL

## 2024-09-24 ENCOUNTER — TELEMEDICINE (OUTPATIENT)
Dept: PRIMARY CARE | Facility: CLINIC | Age: 79
End: 2024-09-24
Payer: MEDICARE

## 2024-09-24 DIAGNOSIS — U07.1 COVID-19: Primary | ICD-10-CM

## 2024-09-24 PROCEDURE — 99441 PR PHYS/QHP TELEPHONE EVALUATION 5-10 MIN: CPT | Performed by: FAMILY MEDICINE

## 2024-09-24 PROCEDURE — 1123F ACP DISCUSS/DSCN MKR DOCD: CPT | Performed by: FAMILY MEDICINE

## 2024-09-24 NOTE — PROGRESS NOTES
Chief complaint:   Chief Complaint   Patient presents with    COVID-19     Virtual or Telephone Consent    A telephone visit (audio only) between the patient (at the originating site) and the provider (at the distant site) was utilized to provide this telehealth service.   Verbal consent was requested and obtained from Kristin Charles on this date, 09/24/24 for a telehealth visit.     HPI:  Kristin Charles is a 79 y.o. female who presents for evaluation of COVID-19. Her symptoms just started yesterday. She is currently feeling a sore throat, raspy throat, coughing, feverish yesterday which has resolved today, and a headache. She has myalgias with this. No SOB.     Assessment/Plan   Problem List Items Addressed This Visit    None  Visit Diagnoses       COVID-19    -  Primary    Relevant Medications    nirmatrelvir-ritonavir (Paxlovid) 300 mg (150 mg x 2)-100 mg tablet therapy pack        EUA discussed, she is amenable to taking Paxlovid  Advised patient not to take Trazodone with this medication, she has not taken in months and will not take it with the Paxlovid.   Signs and sx for which to seek emergency care were discussed  Follow up PRN (she has routine follow up early October in office with me)    Melanie Muñoz,

## 2024-10-01 ENCOUNTER — APPOINTMENT (OUTPATIENT)
Dept: PRIMARY CARE | Facility: CLINIC | Age: 79
End: 2024-10-01
Payer: MEDICARE

## 2024-10-01 VITALS
DIASTOLIC BLOOD PRESSURE: 70 MMHG | SYSTOLIC BLOOD PRESSURE: 120 MMHG | BODY MASS INDEX: 27.62 KG/M2 | TEMPERATURE: 96.6 F | WEIGHT: 151 LBS

## 2024-10-01 DIAGNOSIS — E03.9 HYPOTHYROIDISM, UNSPECIFIED TYPE: ICD-10-CM

## 2024-10-01 DIAGNOSIS — E11.9 DIABETES MELLITUS, TYPE II, INSULIN DEPENDENT (MULTI): ICD-10-CM

## 2024-10-01 DIAGNOSIS — Z79.4 DIABETES MELLITUS, TYPE II, INSULIN DEPENDENT (MULTI): ICD-10-CM

## 2024-10-01 PROBLEM — I73.89 OTHER SPECIFIED PERIPHERAL VASCULAR DISEASES: Status: RESOLVED | Noted: 2024-02-09 | Resolved: 2024-10-01

## 2024-10-01 LAB
POC FINGERSTICK BLOOD GLUCOSE: 205 MG/DL (ref 70–100)
POC HEMOGLOBIN A1C: 8 % (ref 4.2–6.5)

## 2024-10-01 PROCEDURE — 1123F ACP DISCUSS/DSCN MKR DOCD: CPT | Performed by: FAMILY MEDICINE

## 2024-10-01 PROCEDURE — 99213 OFFICE O/P EST LOW 20 MIN: CPT | Performed by: FAMILY MEDICINE

## 2024-10-01 PROCEDURE — 83036 HEMOGLOBIN GLYCOSYLATED A1C: CPT | Performed by: FAMILY MEDICINE

## 2024-10-01 PROCEDURE — 3078F DIAST BP <80 MM HG: CPT | Performed by: FAMILY MEDICINE

## 2024-10-01 PROCEDURE — 3074F SYST BP LT 130 MM HG: CPT | Performed by: FAMILY MEDICINE

## 2024-10-01 PROCEDURE — 1036F TOBACCO NON-USER: CPT | Performed by: FAMILY MEDICINE

## 2024-10-01 PROCEDURE — 82962 GLUCOSE BLOOD TEST: CPT | Performed by: FAMILY MEDICINE

## 2024-10-01 NOTE — PROGRESS NOTES
Chief complaint:   Chief Complaint   Patient presents with    6 week follow up       HPI:  Kristin Charles is a 79 y.o. female who presents for evaluation of   She is taking her medications and monitoring her blood sugars.     She is on Prednisone 5 mg currently  She is taking insulin (Tresiba) 5 units at bedtime  She has decreased her Jardiance from 10 mg to 5 mg  (due to recurrent yeast infections). She is hesitant to stop cardiac meds at this time    She is moving 10/15/2024 to an apartment with a gym and yoga classes from her home which has been a lot of packing and work  She has been unable to do cardiac rehab due to time  She had COVID-19 9/22/2024    No chest pain or fluttering. Overall since her heart surgery she has noted improved energy levels.     Physical exam:  /70   Temp 35.9 °C (96.6 °F)   Wt 68.5 kg (151 lb)   BMI 27.62 kg/m²   General: NAD, well appearing female  Heart: RRR, no mumur appreciated  Lungs: CTAB, no wheezes, rales, rhonchi  Abdomen: soft, non tender, normoactive BS, no organomegaly  Extremities: No LE edema    Assessment/Plan   Problem List Items Addressed This Visit       Hypothyroidism    Diabetes mellitus, type II, insulin dependent (Multi)    Relevant Orders    POCT glycosylated hemoglobin (Hb A1C) manually resulted (Completed)    POCT Fingerstick Glucose manually resulted (Completed)   - HbA1c has improved to 8.0 % from 11.6%  - IO non fasting glucose was 205 (ate a banana today before this visit)  - She has decreased her Jardiance from 10 mg to 5 mg due to recurrent yeast infections and is managing, ok to continue this dosing  - Continue Insulin 5 units while on Prednisone 5 mg once daily. Will continue to monitor and taper down on insulin as able while the steroid decreases.   - keep her follow up in 1 mo, sooner as needed      Melanie Muñoz, DO

## 2024-10-08 PROCEDURE — RXMED WILLOW AMBULATORY MEDICATION CHARGE

## 2024-10-09 ENCOUNTER — APPOINTMENT (OUTPATIENT)
Dept: PRIMARY CARE | Facility: CLINIC | Age: 79
End: 2024-10-09
Payer: MEDICARE

## 2024-10-10 ENCOUNTER — PHARMACY VISIT (OUTPATIENT)
Dept: PHARMACY | Facility: CLINIC | Age: 79
End: 2024-10-10
Payer: COMMERCIAL

## 2024-10-11 ENCOUNTER — PHARMACY VISIT (OUTPATIENT)
Dept: PHARMACY | Facility: CLINIC | Age: 79
End: 2024-10-11
Payer: COMMERCIAL

## 2024-10-11 DIAGNOSIS — E11.9 DIABETES MELLITUS, TYPE II, INSULIN DEPENDENT (MULTI): Primary | ICD-10-CM

## 2024-10-11 DIAGNOSIS — Z79.4 DIABETES MELLITUS, TYPE II, INSULIN DEPENDENT (MULTI): Primary | ICD-10-CM

## 2024-10-11 PROCEDURE — RXMED WILLOW AMBULATORY MEDICATION CHARGE

## 2024-10-11 RX ORDER — BLOOD-GLUCOSE CONTROL, NORMAL
EACH MISCELLANEOUS
Qty: 100 EACH | Refills: 11 | Status: SHIPPED | OUTPATIENT
Start: 2024-10-11

## 2024-10-11 RX ORDER — DEXTROSE 4 G
TABLET,CHEWABLE ORAL
Qty: 1 EACH | Refills: 0 | Status: SHIPPED | OUTPATIENT
Start: 2024-10-11

## 2024-10-24 DIAGNOSIS — Z79.4 DIABETES MELLITUS, TYPE II, INSULIN DEPENDENT (MULTI): Primary | ICD-10-CM

## 2024-10-24 DIAGNOSIS — E11.9 DIABETES MELLITUS, TYPE II, INSULIN DEPENDENT (MULTI): Primary | ICD-10-CM

## 2024-10-25 DIAGNOSIS — B37.31 CANDIDAL VAGINITIS: Primary | ICD-10-CM

## 2024-10-25 RX ORDER — FLUCONAZOLE 100 MG/1
100 TABLET ORAL DAILY
Qty: 14 TABLET | Refills: 0 | Status: SHIPPED | OUTPATIENT
Start: 2024-10-25 | End: 2024-11-08

## 2024-10-30 ENCOUNTER — APPOINTMENT (OUTPATIENT)
Dept: PHARMACY | Facility: HOSPITAL | Age: 79
End: 2024-10-30
Payer: MEDICARE

## 2024-10-30 DIAGNOSIS — Z79.4 DIABETES MELLITUS, TYPE II, INSULIN DEPENDENT (MULTI): ICD-10-CM

## 2024-10-30 DIAGNOSIS — I50.22 HEART FAILURE WITH MID-RANGE EJECTION FRACTION: ICD-10-CM

## 2024-10-30 DIAGNOSIS — E11.9 DIABETES MELLITUS, TYPE II, INSULIN DEPENDENT (MULTI): ICD-10-CM

## 2024-10-30 PROCEDURE — RXMED WILLOW AMBULATORY MEDICATION CHARGE

## 2024-10-30 RX ORDER — METOPROLOL SUCCINATE 25 MG/1
25 TABLET, EXTENDED RELEASE ORAL DAILY
Qty: 90 TABLET | Refills: 3 | Status: SHIPPED | OUTPATIENT
Start: 2024-10-30 | End: 2025-10-30

## 2024-10-31 ENCOUNTER — PHARMACY VISIT (OUTPATIENT)
Dept: PHARMACY | Facility: CLINIC | Age: 79
End: 2024-10-31
Payer: COMMERCIAL

## 2024-11-09 ENCOUNTER — LAB (OUTPATIENT)
Dept: LAB | Facility: LAB | Age: 79
End: 2024-11-09
Payer: MEDICARE

## 2024-11-09 DIAGNOSIS — E55.9 VITAMIN D INSUFFICIENCY: ICD-10-CM

## 2024-11-09 DIAGNOSIS — M35.3 PMR (POLYMYALGIA RHEUMATICA) (MULTI): ICD-10-CM

## 2024-11-09 DIAGNOSIS — Z79.52 CURRENT CHRONIC USE OF SYSTEMIC STEROIDS: ICD-10-CM

## 2024-11-09 LAB
25(OH)D3 SERPL-MCNC: 54 NG/ML (ref 30–100)
ALBUMIN SERPL BCP-MCNC: 3.8 G/DL (ref 3.4–5)
ALP SERPL-CCNC: 47 U/L (ref 33–136)
ALT SERPL W P-5'-P-CCNC: 11 U/L (ref 7–45)
ANION GAP SERPL CALC-SCNC: 9 MMOL/L (ref 10–20)
AST SERPL W P-5'-P-CCNC: 11 U/L (ref 9–39)
BASOPHILS # BLD AUTO: 0.05 X10*3/UL (ref 0–0.1)
BASOPHILS NFR BLD AUTO: 0.7 %
BILIRUB SERPL-MCNC: 0.5 MG/DL (ref 0–1.2)
BUN SERPL-MCNC: 20 MG/DL (ref 6–23)
CALCIUM SERPL-MCNC: 8.8 MG/DL (ref 8.6–10.6)
CHLORIDE SERPL-SCNC: 105 MMOL/L (ref 98–107)
CO2 SERPL-SCNC: 29 MMOL/L (ref 21–32)
CREAT SERPL-MCNC: 1 MG/DL (ref 0.5–1.05)
CRP SERPL-MCNC: 0.37 MG/DL
EGFRCR SERPLBLD CKD-EPI 2021: 57 ML/MIN/1.73M*2
EOSINOPHIL # BLD AUTO: 0.43 X10*3/UL (ref 0–0.4)
EOSINOPHIL NFR BLD AUTO: 6.1 %
ERYTHROCYTE [DISTWIDTH] IN BLOOD BY AUTOMATED COUNT: 14.6 % (ref 11.5–14.5)
ERYTHROCYTE [SEDIMENTATION RATE] IN BLOOD BY WESTERGREN METHOD: 15 MM/H (ref 0–30)
GLUCOSE SERPL-MCNC: 142 MG/DL (ref 74–99)
HCT VFR BLD AUTO: 45 % (ref 36–46)
HGB BLD-MCNC: 14.2 G/DL (ref 12–16)
IMM GRANULOCYTES # BLD AUTO: 0.04 X10*3/UL (ref 0–0.5)
IMM GRANULOCYTES NFR BLD AUTO: 0.6 % (ref 0–0.9)
LYMPHOCYTES # BLD AUTO: 1.87 X10*3/UL (ref 0.8–3)
LYMPHOCYTES NFR BLD AUTO: 26.6 %
MCH RBC QN AUTO: 30.5 PG (ref 26–34)
MCHC RBC AUTO-ENTMCNC: 31.6 G/DL (ref 32–36)
MCV RBC AUTO: 97 FL (ref 80–100)
MONOCYTES # BLD AUTO: 0.7 X10*3/UL (ref 0.05–0.8)
MONOCYTES NFR BLD AUTO: 10 %
NEUTROPHILS # BLD AUTO: 3.94 X10*3/UL (ref 1.6–5.5)
NEUTROPHILS NFR BLD AUTO: 56 %
NRBC BLD-RTO: 0 /100 WBCS (ref 0–0)
PLATELET # BLD AUTO: 159 X10*3/UL (ref 150–450)
POTASSIUM SERPL-SCNC: 4.3 MMOL/L (ref 3.5–5.3)
PROT SERPL-MCNC: 6.1 G/DL (ref 6.4–8.2)
RBC # BLD AUTO: 4.66 X10*6/UL (ref 4–5.2)
SODIUM SERPL-SCNC: 139 MMOL/L (ref 136–145)
WBC # BLD AUTO: 7 X10*3/UL (ref 4.4–11.3)

## 2024-11-09 PROCEDURE — 80053 COMPREHEN METABOLIC PANEL: CPT

## 2024-11-09 PROCEDURE — 86140 C-REACTIVE PROTEIN: CPT

## 2024-11-09 PROCEDURE — 36415 COLL VENOUS BLD VENIPUNCTURE: CPT

## 2024-11-09 PROCEDURE — 85025 COMPLETE CBC W/AUTO DIFF WBC: CPT

## 2024-11-09 PROCEDURE — 85652 RBC SED RATE AUTOMATED: CPT

## 2024-11-09 PROCEDURE — 82306 VITAMIN D 25 HYDROXY: CPT

## 2024-11-11 PROBLEM — R06.09 DOE (DYSPNEA ON EXERTION): Status: RESOLVED | Noted: 2023-09-28 | Resolved: 2024-11-11

## 2024-11-12 ENCOUNTER — APPOINTMENT (OUTPATIENT)
Dept: PRIMARY CARE | Facility: CLINIC | Age: 79
End: 2024-11-12
Payer: MEDICARE

## 2024-11-12 VITALS
HEIGHT: 61 IN | DIASTOLIC BLOOD PRESSURE: 70 MMHG | BODY MASS INDEX: 29.27 KG/M2 | SYSTOLIC BLOOD PRESSURE: 124 MMHG | WEIGHT: 155 LBS | TEMPERATURE: 96.6 F

## 2024-11-12 DIAGNOSIS — Z00.00 ROUTINE GENERAL MEDICAL EXAMINATION AT HEALTH CARE FACILITY: Primary | ICD-10-CM

## 2024-11-12 DIAGNOSIS — Z79.4 DIABETES MELLITUS, TYPE II, INSULIN DEPENDENT (MULTI): ICD-10-CM

## 2024-11-12 DIAGNOSIS — E11.9 DIABETES MELLITUS, TYPE II, INSULIN DEPENDENT (MULTI): ICD-10-CM

## 2024-11-12 DIAGNOSIS — M35.3 PMR (POLYMYALGIA RHEUMATICA) (MULTI): ICD-10-CM

## 2024-11-12 DIAGNOSIS — I50.22 HEART FAILURE WITH MID-RANGE EJECTION FRACTION: ICD-10-CM

## 2024-11-12 PROCEDURE — 3074F SYST BP LT 130 MM HG: CPT | Performed by: FAMILY MEDICINE

## 2024-11-12 PROCEDURE — 3078F DIAST BP <80 MM HG: CPT | Performed by: FAMILY MEDICINE

## 2024-11-12 PROCEDURE — 1123F ACP DISCUSS/DSCN MKR DOCD: CPT | Performed by: FAMILY MEDICINE

## 2024-11-12 PROCEDURE — 1159F MED LIST DOCD IN RCRD: CPT | Performed by: FAMILY MEDICINE

## 2024-11-12 PROCEDURE — G0439 PPPS, SUBSEQ VISIT: HCPCS | Performed by: FAMILY MEDICINE

## 2024-11-12 PROCEDURE — 99213 OFFICE O/P EST LOW 20 MIN: CPT | Performed by: FAMILY MEDICINE

## 2024-11-12 PROCEDURE — 1036F TOBACCO NON-USER: CPT | Performed by: FAMILY MEDICINE

## 2024-11-12 PROCEDURE — 1170F FXNL STATUS ASSESSED: CPT | Performed by: FAMILY MEDICINE

## 2024-11-12 PROCEDURE — 1160F RVW MEDS BY RX/DR IN RCRD: CPT | Performed by: FAMILY MEDICINE

## 2024-11-12 ASSESSMENT — ACTIVITIES OF DAILY LIVING (ADL)
BATHING: INDEPENDENT
TAKING_MEDICATION: INDEPENDENT
DOING_HOUSEWORK: INDEPENDENT
MANAGING_FINANCES: INDEPENDENT
DRESSING: INDEPENDENT
GROCERY_SHOPPING: INDEPENDENT

## 2024-11-12 ASSESSMENT — ENCOUNTER SYMPTOMS
COUGH: 0
SHORTNESS OF BREATH: 0
ACTIVITY CHANGE: 0
MYALGIAS: 0
ARTHRALGIAS: 0
PALPITATIONS: 0

## 2024-11-12 NOTE — PROGRESS NOTES
"Subjective   Reason for Visit: Kristin Charles is an 79 y.o. female here for a Medicare Wellness visit.     Past Medical, Surgical, and Family History reviewed and updated in chart.    Reviewed all medications by prescribing practitioner or clinical pharmacist (such as prescriptions, OTCs, herbal therapies and supplements) and documented in the medical record.    HPI    She moved October 15, 2024 into an apartment. She is happy to be in this new environment     Patient Care Team:  Melanie Muñoz DO as PCP - General  Melanie Muñoz DO as PCP - [employer] PCP  Cruz Fang MD as Consulting Physician (Cardiology)     Review of Systems   Constitutional:  Negative for activity change.   Respiratory:  Negative for cough and shortness of breath.    Cardiovascular:  Negative for chest pain and palpitations.   Musculoskeletal:  Negative for arthralgias and myalgias.       Objective   Vitals:  /70 (BP Location: Right arm, Patient Position: Sitting)   Temp 35.9 °C (96.6 °F)   Ht 1.549 m (5' 1\")   Wt 70.3 kg (155 lb)   BMI 29.29 kg/m²       Physical Exam  Constitutional:       Appearance: Normal appearance.   HENT:      Head: Normocephalic.   Eyes:      Conjunctiva/sclera: Conjunctivae normal.   Cardiovascular:      Rate and Rhythm: Normal rate and regular rhythm.   Pulmonary:      Effort: Pulmonary effort is normal.      Breath sounds: Normal breath sounds.   Abdominal:      General: Abdomen is flat. Bowel sounds are normal.      Palpations: Abdomen is soft.   Skin:     General: Skin is warm and dry.   Neurological:      General: No focal deficit present.      Mental Status: She is alert.   Psychiatric:         Mood and Affect: Mood normal.         Assessment & Plan  Routine general medical examination at health care facility    Orders:    1 Year Follow Up In Primary Care - Wellness Exam; Future    Heart failure with mid-range ejection fraction  - continue care with cardiology  - continue " Entresto and Jardiance       PMR (polymyalgia rheumatica) (Multi)  - continue care with rheumatology  - currently asymptomatic  - continue Prednisone taper       Diabetes mellitus, type II, insulin dependent (Multi)  - continue Insulin and Jardiance   - continue Insulin taper with Prednisone taper and goal to be able to stop once off Prednisone

## 2024-11-12 NOTE — ASSESSMENT & PLAN NOTE
- continue Insulin and Jardiance   - continue Insulin taper with Prednisone taper and goal to be able to stop once off Prednisone

## 2024-11-13 NOTE — PROGRESS NOTES
"Rheumatology New Outpatient  Note    Subjective   Kristin Charles \"Crescencio" is a 79 y.o. female presenting today for Joint Pain.    History of Presenting Problem:     Rheum history: started having shoulder/hip pain and stiffness around 1/2024 and her CRP was elevated. She was initially given a medrol adam. She continued to have symptoms and in 3/15/2024 and she was started on 15 mg daily. After 10 days she self reduced to 10 mg for 2 weeks then she was instructed to go back to 15 mg. She is overall feeling better but continues to have more pain and stiffness in the morning. No headache, jaw pain or scalp tenderness.    She is feeling well on 3 mg . No shoulder pain or stiffness.  Denies stiffness in shoulders, hips.    Past Medical History:   Past Medical History:   Diagnosis Date    Abnormal ECG     Actinic keratosis     Acute candidiasis of vulva and vagina     Yeast infection of the vagina    Acute laryngitis 03/20/2018    Acute laryngitis    Lee's cyst of knee 04/06/2023    Bitten or stung by nonvenomous insect and other nonvenomous arthropods, initial encounter 09/02/2015    Bug bite with infection    Candidiasis, unspecified 10/13/2017    Yeast infection    CHF (congestive heart failure)     Chronic ethmoidal sinusitis 11/24/2020    Chronic ethmoidal sinusitis    Chronic maxillary sinusitis 08/06/2020    Chronic maxillary sinusitis    Diabetes mellitus (Multi)     Disease of thyroid gland     LAYTON (dyspnea on exertion) 09/28/2023    Eczema     GERD (gastroesophageal reflux disease)     Hair loss 04/06/2023    Heart murmur     Heart valve disease     Laceration without foreign body of unspecified forearm, initial encounter 01/09/2022    Forearm laceration    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    Lumbago with sciatica, left side 03/29/2019    Chronic bilateral low back pain with left-sided sciatica    Lymphocytic colitis 05/05/2023    Colonoscopy: 4/24/2023    Nail disorder, unspecified 08/08/2023    Nasal turbinate " hypertrophy 04/06/2023    Neoplasm of uncertain behavior of skin 08/08/2023    Obesity 01/19/2015    Onychomycosis 04/06/2023    Open wound of hip and thigh with tendon involvement 05/24/2018    Osteoarthritis 04/06/2023    Osteoarthritis, localized, knee 04/06/2023    Overweight (BMI 25.0-29.9) 04/06/2023    Personal history of diseases of the skin and subcutaneous tissue 01/09/2022    History of contact dermatitis    Personal history of diseases of the skin and subcutaneous tissue 09/02/2015    History of cellulitis    Personal history of other diseases of the musculoskeletal system and connective tissue     Personal history of arthritis    Personal history of other diseases of the nervous system and sense organs     History of cataract    Personal history of other diseases of the nervous system and sense organs 09/20/2019    History of sciatica    Personal history of other endocrine, nutritional and metabolic disease     History of hypothyroidism    Personal history of other infectious and parasitic diseases 09/23/2017    History of erysipelas    Personal history of other specified conditions 03/06/2020    History of epistaxis    Rash and other nonspecific skin eruption 06/15/2015    Skin rash    Rhinitis 10 years    Sciatica 12/11/2014    Status post joint replacement 06/19/2023    Strain of muscle, fascia and tendon of the posterior muscle group at thigh level, unspecified thigh, initial encounter 05/01/2018    Hamstring strain    Tinnitus 10 years    Vaginal discharge 04/06/2023    Varus deformity, not elsewhere classified, unspecified knee 03/06/2019    Acquired genu varum       Allergies:   Allergies   Allergen Reactions    Other Other    Sulfa (Sulfonamide Antibiotics) Unknown    Miconazole Rash    Neomycin-Bacitracin-Polymyxin Rash       Medications:   Current Outpatient Medications:     acetaminophen (Tylenol) 325 mg tablet, Take 2 tablets (650 mg) by mouth every 6 hours if needed for moderate pain (4 -  "6), mild pain (1 - 3), headaches or fever (temp greater than 38.0 C)., Disp: , Rfl:     amoxicillin (Amoxil) 500 mg capsule, Take 4 caps (2000 mg) 30-60mins prior to your dental appointment, Disp: 4 capsule, Rfl: 5    aspirin 81 mg EC tablet, Take 1 tablet (81 mg) by mouth once daily., Disp: , Rfl:     biotin 10 mg tablet, Take 1 tablet (10 mg) by mouth once daily. (Patient not taking: Reported on 11/12/2024), Disp: , Rfl:     blood sugar diagnostic strip, Use as directed to test blood sugar once daily, Disp: 100 each, Rfl: 11    blood-glucose meter misc, Use as directed to test blood sugar once daily, Disp: 1 each, Rfl: 0    celecoxib (CeleBREX) 200 mg capsule, Take 1 capsule (200 mg) by mouth once daily., Disp: , Rfl:     empagliflozin (Jardiance) 10 mg, Take 1 tablet (10 mg) by mouth once daily. (Patient taking differently: Take 0.5 tablets (5 mg) by mouth once daily.), Disp: 90 tablet, Rfl: 3    famotidine (Pepcid) 10 mg tablet, Take 1 tablet (10 mg) by mouth once daily as needed for heartburn., Disp: , Rfl:     insulin degludec (Tresiba FlexTouch) 100 unit/mL (3 mL) injection, Inject 5 Units under the skin once daily at bedtime. Take as directed per insulin instructions. (Patient taking differently: Inject 4 Units under the skin once daily at bedtime. Take as directed per insulin instructions.), Disp: 15 mL, Rfl: 12    lancets 30 gauge misc, Use as directed to test blood sugar once daily, Disp: 100 each, Rfl: 11    lancets misc, Use as directed to test blood sugar once daily, Disp: 100 each, Rfl: 3    levothyroxine (Synthroid, Levoxyl) 125 mcg tablet, Take 1 tablet (125 mcg) by mouth once daily., Disp: 90 tablet, Rfl: 1    metoprolol succinate XL (Toprol-XL) 25 mg 24 hr tablet, Take 1 tablet (25 mg) by mouth once daily. Do not crush or chew., Disp: 90 tablet, Rfl: 3    pen needle, diabetic 31 gauge x 5/16\" needle, Use as directed to inject insulin once daily, Disp: 100 each, Rfl: 11    predniSONE (Deltasone) 1 " "mg tablet, Take 4 tablets (4 mg) by mouth once daily. (Patient taking differently: Take 3 tablets (3 mg) by mouth once daily.), Disp: 120 tablet, Rfl: 1    predniSONE (Deltasone) 5 mg tablet, Take 1 tablet PO daily with 1 mg prednisone tablet for total of 6 mg (Patient not taking: Reported on 11/12/2024), Disp: 30 tablet, Rfl: 2    sacubitriL-valsartan (Entresto) 24-26 mg tablet, Take 1 tablet by mouth once daily. (Patient taking differently: Take 0.5 tablets by mouth 2 times a day.), Disp: 90 tablet, Rfl: 3    traZODone (Desyrel) 50 mg tablet, Take 1 tablet (50 mg) by mouth as needed at bedtime., Disp: , Rfl:     Review of Systems:   Constitutional: Denies fever, chills   Eyes: Denies dry eyes, pain in the eyes   ENT: Denies dry mouth, loss of taste, sores in the mouth  Cardiovascular: Denies chest pain, palpitations   Respiratory: Denies shortness of breath   Gastrointestinal: Denies heartburn   Integumentary: Denies photosensitivity, rash or lesions, Raynaud's   Neurological: Denies any numbness or tingling    MSK: As per HPI.     All 10 review of systems have been reviewed and are negative for complaint except as noted in the HPI    Objective   Physical Examination:  There were no vitals filed for this visit.        Growth %ile SmartLinks can only be used for patients less than 20 years old.  Ht Readings from Last 1 Encounters:   11/12/24 1.549 m (5' 1\")     Wt Readings from Last 1 Encounters:   11/12/24 70.3 kg (155 lb)       General - NAD, sitting up in chair, well-groomed, pleasant, AAOx3  Head: Normocephalic, atraumatic  Eyes - PERRLA, EOMI. No conjunctiva injection.   Cardiovascular - No murmurs or rubs.  Lungs - Symmetric chest expansion.   Skin - No rashes or ulcers. Skin warm and dry. No erythema on bilateral cheeks.  Extremities - No edema, cyanosis ,or clubbing  Neurological - Alert and oriented x 3,  grossly intact. No focal deficit.          Laboratory Testing:  3/2024: CMP normal, CRP 1.08, ESR 10, " "CBC normal, RF-, CCP-  2/2024: CRP 1.25, ESR 26      Assessment/Plan   Kristin Charles \"Edel\" is a 79 y.o. female with PMHx of PVD, aortic stenosis, HFrEF, HTN, allergic rhinitis, DM, Hypothyroidism, GERD CKD, Anxiety, OA, and lumbar radiculopathy who is presenting today as a Follow up for PMR    ##Polymyalgia rheumatica (PMR):   -Diagnosed by PCP, in ~2/2024. Shoulder/hip pain/stiffness and elevated CRP  and had prompt response to  steroids.   -Current dose of prednisone:3 mg daily  -Symptoms: Improved  -Steroid plan: Going down slowly. Discussed risks and benefits and considering Kevzara.   -Most recent ESR/CRP 11/2024 normal  -GCA symptoms: no headache, jaw pain or claudications. No new vision changes. Patient is aware and advised to go to ED if he experiences any GCA symptoms.  -Take 2000 units vitamin D and calcium supplements  -DEXA scan  normal 9/2024      The assessment and plan, risk and benefits were discussed with the patient. All of the patients questions were answered and patient agrees to the plan.      Mellissa Webster MD  Clinical   Department of Rheumatology   [facility]            " [History reviewed] : History reviewed. [Medications and Allergies reviewed] : Medications and allergies reviewed.

## 2024-11-14 ENCOUNTER — APPOINTMENT (OUTPATIENT)
Dept: RHEUMATOLOGY | Facility: CLINIC | Age: 79
End: 2024-11-14
Payer: MEDICARE

## 2024-11-14 VITALS
HEART RATE: 49 BPM | WEIGHT: 153.8 LBS | SYSTOLIC BLOOD PRESSURE: 124 MMHG | HEIGHT: 62 IN | DIASTOLIC BLOOD PRESSURE: 77 MMHG | TEMPERATURE: 97.3 F | BODY MASS INDEX: 28.3 KG/M2

## 2024-11-14 DIAGNOSIS — Z13.820 SCREENING FOR OSTEOPOROSIS: ICD-10-CM

## 2024-11-14 DIAGNOSIS — Z79.52 CURRENT CHRONIC USE OF SYSTEMIC STEROIDS: ICD-10-CM

## 2024-11-14 DIAGNOSIS — M35.3 PMR (POLYMYALGIA RHEUMATICA) (MULTI): Primary | ICD-10-CM

## 2024-11-14 PROCEDURE — 3078F DIAST BP <80 MM HG: CPT | Performed by: STUDENT IN AN ORGANIZED HEALTH CARE EDUCATION/TRAINING PROGRAM

## 2024-11-14 PROCEDURE — 1159F MED LIST DOCD IN RCRD: CPT | Performed by: STUDENT IN AN ORGANIZED HEALTH CARE EDUCATION/TRAINING PROGRAM

## 2024-11-14 PROCEDURE — 1160F RVW MEDS BY RX/DR IN RCRD: CPT | Performed by: STUDENT IN AN ORGANIZED HEALTH CARE EDUCATION/TRAINING PROGRAM

## 2024-11-14 PROCEDURE — G2211 COMPLEX E/M VISIT ADD ON: HCPCS | Performed by: STUDENT IN AN ORGANIZED HEALTH CARE EDUCATION/TRAINING PROGRAM

## 2024-11-14 PROCEDURE — 3074F SYST BP LT 130 MM HG: CPT | Performed by: STUDENT IN AN ORGANIZED HEALTH CARE EDUCATION/TRAINING PROGRAM

## 2024-11-14 PROCEDURE — 1036F TOBACCO NON-USER: CPT | Performed by: STUDENT IN AN ORGANIZED HEALTH CARE EDUCATION/TRAINING PROGRAM

## 2024-11-14 PROCEDURE — 99214 OFFICE O/P EST MOD 30 MIN: CPT | Performed by: STUDENT IN AN ORGANIZED HEALTH CARE EDUCATION/TRAINING PROGRAM

## 2024-11-14 PROCEDURE — 1123F ACP DISCUSS/DSCN MKR DOCD: CPT | Performed by: STUDENT IN AN ORGANIZED HEALTH CARE EDUCATION/TRAINING PROGRAM

## 2024-11-14 RX ORDER — PREDNISONE 1 MG/1
3 TABLET ORAL DAILY
Qty: 90 TABLET | Refills: 2 | Status: SHIPPED | OUTPATIENT
Start: 2024-11-14

## 2024-11-18 ENCOUNTER — PATIENT MESSAGE (OUTPATIENT)
Dept: PRIMARY CARE | Facility: CLINIC | Age: 79
End: 2024-11-18
Payer: MEDICARE

## 2024-11-18 DIAGNOSIS — Z79.4 DIABETES MELLITUS, TYPE II, INSULIN DEPENDENT (MULTI): Primary | ICD-10-CM

## 2024-11-18 DIAGNOSIS — E11.9 DIABETES MELLITUS, TYPE II, INSULIN DEPENDENT (MULTI): Primary | ICD-10-CM

## 2024-11-20 ENCOUNTER — APPOINTMENT (OUTPATIENT)
Dept: PHARMACY | Facility: HOSPITAL | Age: 79
End: 2024-11-20
Payer: MEDICARE

## 2024-11-20 DIAGNOSIS — E11.9 DIABETES MELLITUS TYPE II, NON INSULIN DEPENDENT (MULTI): ICD-10-CM

## 2024-11-20 DIAGNOSIS — Z79.4 DIABETES MELLITUS, TYPE II, INSULIN DEPENDENT (MULTI): Primary | ICD-10-CM

## 2024-11-20 DIAGNOSIS — E11.9 DIABETES MELLITUS, TYPE II, INSULIN DEPENDENT (MULTI): Primary | ICD-10-CM

## 2024-11-20 DIAGNOSIS — I50.22 HEART FAILURE WITH MID-RANGE EJECTION FRACTION: ICD-10-CM

## 2024-11-20 ASSESSMENT — ENCOUNTER SYMPTOMS: DIABETIC ASSOCIATED SYMPTOMS: 0

## 2024-11-20 NOTE — PROGRESS NOTES
"[facility] Pharmacy Consult  Kristin Charles \"Edel\" is a 79 y.o. female was referred to Clinical Pharmacy Team for a Pharmacy consult.  The patient was referred for their Diabetes.    Referring Provider: Melanie Muñoz DO    Subjective   Allergies   Allergen Reactions    Other Other    Sulfa (Sulfonamide Antibiotics) Unknown    Miconazole Rash    Neomycin-Bacitracin-Polymyxin Rash       [facility] - [place], [place] - [address] [facility]  [address] [facility] [address]  Phone: [phone] Fax: [phone]    [facility]  [address], [address]  [facility] [address]  Phone: [phone] Fax: [phone]    [facility]  [address], [address]  [facility] [address]  Phone: [phone] Fax: [phone]      Diabetes  She presents for her follow-up diabetic visit. She has type 2 diabetes mellitus. Her disease course has been stable. There are no hypoglycemic associated symptoms. There are no diabetic associated symptoms. There are no hypoglycemic complications. Symptoms are stable. There are no diabetic complications. Risk factors for coronary artery disease include diabetes mellitus, dyslipidemia and hypertension. Current diabetic treatment includes insulin injections and oral agent (monotherapy). She is compliant with treatment all of the time. Her overall blood glucose range is 110-130 mg/dl.     Doing well with prednisone and insulin dose. Planning to see nutritionist regarding diabetic diet. Recently joined an exercise group at her new apartment place.    Review of Systems    Objective     There were no vitals taken for this visit.     LAB  Lab Results   Component Value Date    BILITOT 0.5 11/09/2024    CALCIUM 8.8 11/09/2024    CO2 29 11/09/2024     11/09/2024    CREATININE 1.00 11/09/2024    GLUCOSE 142 (H) 11/09/2024    ALKPHOS 47 11/09/2024    K 4.3 11/09/2024    PROT 6.1 (L) 11/09/2024     11/09/2024    AST 11 11/09/2024    ALT 11 11/09/2024 "    BUN 20 11/09/2024    ANIONGAP 9 (L) 11/09/2024    MG 2.03 06/18/2024    PHOS 3.3 10/07/2022    ALBUMIN 3.8 11/09/2024    GFRF CANCELED 05/27/2023    GFRMALE CANCELED 05/27/2023     Lab Results   Component Value Date    TRIG 136 11/15/2023    CHOL 171 11/15/2023    LDLCALC 90 11/15/2023    HDL 53.6 11/15/2023     Lab Results   Component Value Date    HGBA1C 8.0 (A) 10/01/2024       Current Outpatient Medications on File Prior to Visit   Medication Sig Dispense Refill    acetaminophen (Tylenol) 325 mg tablet Take 2 tablets (650 mg) by mouth every 6 hours if needed for moderate pain (4 - 6), mild pain (1 - 3), headaches or fever (temp greater than 38.0 C).      amoxicillin (Amoxil) 500 mg capsule Take 4 caps (2000 mg) 30-60mins prior to your dental appointment 4 capsule 5    aspirin 81 mg EC tablet Take 1 tablet (81 mg) by mouth once daily.      biotin 10 mg tablet Take 1 tablet (10 mg) by mouth once daily. (Patient not taking: Reported on 11/12/2024)      blood-glucose meter misc Use as directed to test blood sugar once daily 1 each 0    celecoxib (CeleBREX) 200 mg capsule Take 1 capsule (200 mg) by mouth once daily.      empagliflozin (Jardiance) 10 mg Take 1 tablet (10 mg) by mouth once daily. (Patient taking differently: Take 0.5 tablets (5 mg) by mouth once daily.) 90 tablet 3    famotidine (Pepcid) 10 mg tablet Take 1 tablet (10 mg) by mouth once daily as needed for heartburn.      insulin degludec (Tresiba FlexTouch) 100 unit/mL (3 mL) injection Inject 5 Units under the skin once daily at bedtime. Take as directed per insulin instructions. (Patient taking differently: Inject 4 Units under the skin once daily at bedtime. Take as directed per insulin instructions.) 15 mL 12    lancets 30 gauge misc Use as directed to test blood sugar once daily 100 each 11    lancets misc Use as directed to test blood sugar once daily 100 each 3    levothyroxine (Synthroid, Levoxyl) 125 mcg tablet Take 1 tablet (125 mcg) by  "mouth once daily. 90 tablet 1    metoprolol succinate XL (Toprol-XL) 25 mg 24 hr tablet Take 1 tablet (25 mg) by mouth once daily. Do not crush or chew. 90 tablet 3    pen needle, diabetic 31 gauge x 5/16\" needle Use as directed to inject insulin once daily 100 each 11    predniSONE (Deltasone) 1 mg tablet Take 3 tablets (3 mg) by mouth once daily. 90 tablet 2    sacubitriL-valsartan (Entresto) 24-26 mg tablet Take 1 tablet by mouth once daily. (Patient taking differently: Take 0.5 tablets by mouth 2 times a day.) 90 tablet 3    traZODone (Desyrel) 50 mg tablet Take 1 tablet (50 mg) by mouth as needed at bedtime.      [DISCONTINUED] blood sugar diagnostic strip Use as directed to test blood sugar once daily 100 each 11    [DISCONTINUED] predniSONE (Deltasone) 1 mg tablet Take 4 tablets (4 mg) by mouth once daily. (Patient taking differently: Take 3 tablets (3 mg) by mouth once daily.) 120 tablet 1    [DISCONTINUED] predniSONE (Deltasone) 5 mg tablet Take 1 tablet PO daily with 1 mg prednisone tablet for total of 6 mg (Patient not taking: Reported on 11/12/2024) 30 tablet 2     No current facility-administered medications on file prior to visit.        HISTORICAL PHARMACOTHERAPY  -none    DRUG INTERACTIONS  - none    SECONDARY PREVENTION  - Statin? yes  - ACE-I/ARB? yes    Assessment/Plan   Problem List Items Addressed This Visit       Heart failure with mid-range ejection fraction    Relevant Orders    Referral to Clinical Pharmacy    Diabetes mellitus, type II, insulin dependent (Multi) - Primary     CONTINUE all meds as prescribed  DM improving: A1c of 8%, went down from 11%  Educate on hypo- and hyperglycemia  On prednisone 5mg right now, will adjust insulin as needed base on steroid changes  FUV in 3 months         Relevant Medications    blood sugar diagnostic strip    Other Relevant Orders    Referral to Clinical Pharmacy     Other Visit Diagnoses       Diabetes mellitus type II, non insulin dependent (Multi)  "       Relevant Orders    Referral to Clinical Pharmacy          Continue all meds under the continuation of care with the referring provider and clinical pharmacy team.    Luis Pelaez PharmD     Verbal consent to manage patient's drug therapy was obtained from [the patient and/or an individual authorized to act on behalf of a patient]. They were informed they may decline to participate or withdraw from participation in pharmacy services at any time.

## 2024-11-20 NOTE — ASSESSMENT & PLAN NOTE
CONTINUE all meds as prescribed  DM improving: A1c of 8%, went down from 11%  Educate on hypo- and hyperglycemia  On prednisone 5mg right now, will adjust insulin as needed base on steroid changes  FUV in 3 months

## 2024-11-27 ENCOUNTER — APPOINTMENT (OUTPATIENT)
Dept: PHARMACY | Facility: HOSPITAL | Age: 79
End: 2024-11-27
Payer: MEDICARE

## 2024-11-27 DIAGNOSIS — I50.22 HEART FAILURE WITH MID-RANGE EJECTION FRACTION: Primary | ICD-10-CM

## 2024-11-27 NOTE — ASSESSMENT & PLAN NOTE
Discontinuing Jardiance due to recurrent yeast infections.  No adjustments made to other medications. Continue to work on adherence with Entresto.

## 2024-11-27 NOTE — Clinical Note
STOPPING Jardiance due to recurrent yeast infections. Continuing to have them since decreasing to 10mg strength. Reports some issues with adherence to Entresto discussed ways to help remember to use her medications.

## 2024-12-03 ENCOUNTER — TELEPHONE (OUTPATIENT)
Dept: PRIMARY CARE | Facility: CLINIC | Age: 79
End: 2024-12-03
Payer: MEDICARE

## 2024-12-03 DIAGNOSIS — I10 PRIMARY HYPERTENSION: ICD-10-CM

## 2024-12-03 DIAGNOSIS — Z79.4 DIABETES MELLITUS, TYPE II, INSULIN DEPENDENT (MULTI): Primary | ICD-10-CM

## 2024-12-03 DIAGNOSIS — E11.9 DIABETES MELLITUS, TYPE II, INSULIN DEPENDENT (MULTI): Primary | ICD-10-CM

## 2024-12-03 ASSESSMENT — DERMATOLOGY QUALITY OF LIFE (QOL) ASSESSMENT
RATE HOW BOTHERED YOU ARE BY SYMPTOMS OF YOUR SKIN PROBLEM (EG, ITCHING, STINGING BURNING, HURTING OR SKIN IRRITATION): 2
WHAT SINGLE SKIN CONDITION LISTED BELOW IS THE PATIENT ANSWERING THE QUALITY-OF-LIFE ASSESSMENT QUESTIONS ABOUT: ACTINIC KERATOSIS
DATE THE QUALITY-OF-LIFE ASSESSMENT WAS COMPLETED: 67177
RATE HOW EMOTIONALLY BOTHERED YOU ARE BY YOUR SKIN PROBLEM (FOR EXAMPLE, WORRY, EMBARRASSMENT, FRUSTRATION): 0 - NEVER BOTHERED
RATE HOW BOTHERED YOU ARE BY EFFECTS OF YOUR SKIN PROBLEMS ON YOUR ACTIVITIES (EG, GOING OUT, ACCOMPLISHING WHAT YOU WANT, WORK ACTIVITIES OR YOUR RELATIONSHIPS WITH OTHERS): 0 - NEVER BOTHERED

## 2024-12-06 ENCOUNTER — NUTRITION (OUTPATIENT)
Dept: NUTRITION | Facility: CLINIC | Age: 79
End: 2024-12-06
Payer: MEDICARE

## 2024-12-06 DIAGNOSIS — I10 PRIMARY HYPERTENSION: ICD-10-CM

## 2024-12-06 DIAGNOSIS — E11.9 DIABETES MELLITUS, TYPE II, INSULIN DEPENDENT (MULTI): ICD-10-CM

## 2024-12-06 DIAGNOSIS — Z79.4 DIABETES MELLITUS, TYPE II, INSULIN DEPENDENT (MULTI): ICD-10-CM

## 2024-12-06 NOTE — PATIENT INSTRUCTIONS
For snacks, pair a protein source with a carbohydrate (apple or other piece of fruit). You could the apple with peanut butter.   Look for natural peanut butters that do not contain palm oil or hydrogenated oil.   Great sources of protein include chicken breast, turkey, eggs, peanut butter.   Aim for 45 grams of total carbohydrates or less per meal.   A frozen entree can be part of a nutritious diet as long as you read the nutrition label for the following criteria:   No hydrogenated oils  No more than 1 gram of saturated fat per 100 calories  No more than 3 grams of total fat per 100 calories  No more than 200mg sodium per 100 calories  Recommended brands: "[employer], [employer], Seeds of Change, Healthy Choice, Lean Cuisine

## 2024-12-06 NOTE — PROGRESS NOTES
Nutrition: Initial Assessment    Kristin Charles is a 79 y.o. female being seen in office who was referred by Dr. Melanie Muñoz on 12/3/24 for   1. Diabetes mellitus, type II, insulin dependent (Multi)  Referral to Nutrition Services      2. Primary hypertension  Referral to Nutrition Services            Nutrition Assessment    Food & Nutrition Related History: Pt presents for nutrition counseling. She lives alone in an apartment in [place]. The apt offers group exercise classes. Also has access to a pool. During the move, she did not have the mental capacity to focus on nutrition but she can now that she is settled. She has never met with a dietitian or CDCES for DM education, but she is motivated to learn and make changes to improve her glycemic management.     Allergies: None  Intolerance: None  Appetite: Normal  Intake: >75%  GI Symptoms : None      Labs:  CMP trend:    Recent Labs     11/09/24  0831 08/30/24  0736 07/24/24  0737 06/10/24  0710 05/31/24  0731   GLUCOSE 142* 122* 99   < > 82    141 140   < > 143   K 4.3 3.9 4.1   < > 4.0    105 106   < > 106   CO2 29 29 27   < > 28   ANIONGAP 9* 11 11   < > 13   BUN 20 22 19   < > 20   CREATININE 1.00 0.96 0.84   < > 0.91   EGFR 57* 60* 70   < > 64   CALCIUM 8.8 9.1 8.7   < > 8.9   ALBUMIN 3.8 3.4  --   --  3.7   ALKPHOS 47 44  --   --  37   PROT 6.1* 6.0*  --   --  5.8*   AST 11 13  --   --  12   BILITOT 0.5 0.5  --   --  0.6   ALT 11 11  --   --  12    < > = values in this interval not displayed.     Lipid Panel trend:    Recent Labs     11/15/23  0923 11/05/21  0833 10/27/20  1137   CHOL 171 159 167   HDL 53.6 52.0 53.0   LDLCALC 90  --   --    LDLF  --  77 81   VLDL 27 30 33   TRIG 136 150* 166*     Vit D:   Lab Results   Component Value Date    VITD25 54 11/09/2024     DM specific labs:   Recent Labs     10/01/24  0932 05/07/24  0957 12/27/23  1727   HGBA1C 8.0* 11.6* 7.2*       Diabetes:  Diagnosed within the last year   Prior Nutrition  "Education: No   SMBG: meter, fasting am 120-130s   - She is potentially interested in CGM if she can have some training   Hypoglycemia: Denies, keeps Belvita crackers in her purse in case     Current DM Medications/Insulin Regimen:  - Tresiba 4u at bedtime   - She stopped taking Jardiance due to yeast infections       Past Medical History:  Patient Active Problem List   Diagnosis    Allergic rhinitis    Anxiety    CKD (chronic kidney disease) stage 3, GFR 30-59 ml/min (Multi)    External hemorrhoids    GERD (gastroesophageal reflux disease)    Heart palpitations    Hip pain    Hypothyroidism    Insomnia    Grief at loss of child    Lumbar radiculopathy    Nasal polyp    Neck pain    Overactive bladder    Pars defect of lumbar spine    Vitamin D deficiency    Osteoarthritis of left knee    Precordial pain    Heart failure with mid-range ejection fraction    Hyperglycemia    Chronic diastolic heart failure    Palpitations    Primary hypertension    Diabetes mellitus, type II, insulin dependent (Multi)    S/P TAVR (transcatheter aortic valve replacement)    PMR (polymyalgia rheumatica) (Multi)    Basal cell carcinoma        Anthropometrics:  Ht Readings from Last 1 Encounters:   11/14/24 1.575 m (5' 2\")     BMI Readings from Last 1 Encounters:   11/14/24 28.13 kg/m²     Wt Readings from Last 20 Encounters:   11/14/24 69.8 kg (153 lb 12.8 oz)   11/12/24 70.3 kg (155 lb)   10/01/24 68.5 kg (151 lb)   09/05/24 69.9 kg (154 lb)   08/26/24 69.9 kg (154 lb)   08/13/24 69.9 kg (154 lb)   07/23/24 70.3 kg (155 lb)   07/02/24 70.3 kg (155 lb)   06/18/24 70.5 kg (155 lb 6.8 oz)   06/11/24 70.3 kg (155 lb)   06/06/24 70.2 kg (154 lb 12.8 oz)   05/13/24 68 kg (150 lb)   05/13/24 68 kg (150 lb)   05/07/24 68.9 kg (152 lb)   04/25/24 70.3 kg (155 lb)   04/22/24 70.3 kg (155 lb)   04/05/24 71.7 kg (158 lb)   04/04/24 71.7 kg (158 lb)   03/21/24 73.5 kg (162 lb)   03/15/24 73.2 kg (161 lb 6 oz)     Weight change: Wt stable "   Significant weight change: No      Nutrition Diagnosis     Patient has Nutrition Diagnosis: Yes Diagnosis Status (1): New  Nutrition Diagnosis 1: Altered nutrition related to laboratory values Related to (1): T2DM As Evidenced by (1): A1c = 8.0% (10/1/24)     Diagnosis Status (2): New Nutrition Diagnosis 2: Food and nutrition related knowledge deficit  Nutrition Diagnosis 2: Food and nutrition related knowledge deficit Related to (2): limited prior exposure to nutrition recs for T2DM and heart healthy diet As Evidenced by (2): pt asks questions about changing her diet       Nutrition Interventions/Recommendations   FOOD & NUTRIENT DELIVERY: Consistent Carbohydrate Diet, Decreased Sodium Diet, and Increased Fiber Diet    COORDINATION OF CARE: Contacted patient's primary care provider via EPIC chat to discuss possibility of starting Levon 3    NUTRITION EDUCATION:   Provided education on what happens in the body when prediabetes and diabetes develop. Explained why providing consistent amounts of carbohydrates in the diet is helpful for regulating blood sugar. Encouraged choosing foods that contain minimally processed complex carbohydrates, such as high fiber whole grains, beans, starchy vegetables, whole fruits. Simple sugars from fruit juice, sugar-sweetened beverages, and foods with added sugar should be limited in the diet. Also discussed how foods like protein, some fat, and non-starchy vegetables impact blood sugar regulation.  Provided education on Plate Method as a tool for preparing balanced meals. Discussed the following: Fill 1/2 plate with non-starchy vegetables (broccoli, carrots, cauliflower, salad greens, cucumbers, tomatoes). These foods contribute very few carbohydrates, and they add fiber to the meal. Fill 1/4 plate with lean protein (meat, turkey, fish, seafood, eggs, nuts, cheese, cottage cheese, nut butter, tofu, edamame). Fill 1/4 plate with carbohydrates/starches (grains, fruits, starchy  vegetables, beans, yogurt, milk). Aim for at least half of daily grains as whole grains.   Provided education on carbohydrate (carb) counting. Discussed the following:  Foods and beverages that contribute carbohydrates (fruits, grains, legumes, milk, yogurt, starchy vegetables, sugar added to foods, sugar-sweetened beverages)  Foods that contribute no or minimal carbohydrates (protein, fats, non-starchy vegetables)  Advised patient to aim for 30-45 grams of carbohydrate per meal.  Discussed using [email or web address] for recipe ideas.   Provided example meal plan (30g cho per meal).     Educational Handouts: ADA Plate Method, Meal Plan     *Patient expressed understanding of the education provided and denied any additional questions/concerns.       Nutrition Monitoring and Evaluation   Weight maintenance +/- 5 lbs   Consistent meal/snack pattern   A1c 7-8% (given patient's age)     Readiness to Change : Excellent  Level of Understanding : Excellent  Anticipated Compliant : Excellent     Follow-up: 2-3 months or sooner if needed

## 2024-12-10 ENCOUNTER — APPOINTMENT (OUTPATIENT)
Dept: DERMATOLOGY | Facility: CLINIC | Age: 79
End: 2024-12-10
Payer: MEDICARE

## 2024-12-12 ENCOUNTER — PATIENT MESSAGE (OUTPATIENT)
Dept: PRIMARY CARE | Facility: CLINIC | Age: 79
End: 2024-12-12
Payer: MEDICARE

## 2024-12-12 DIAGNOSIS — E03.9 HYPOTHYROIDISM, UNSPECIFIED TYPE: ICD-10-CM

## 2024-12-12 DIAGNOSIS — L29.9 EAR ITCH: Primary | ICD-10-CM

## 2024-12-13 ENCOUNTER — TELEPHONE (OUTPATIENT)
Dept: PRIMARY CARE | Facility: CLINIC | Age: 79
End: 2024-12-13
Payer: MEDICARE

## 2024-12-13 DIAGNOSIS — Z79.4 DIABETES MELLITUS, TYPE II, INSULIN DEPENDENT (MULTI): Primary | ICD-10-CM

## 2024-12-13 DIAGNOSIS — E11.9 DIABETES MELLITUS, TYPE II, INSULIN DEPENDENT (MULTI): Primary | ICD-10-CM

## 2024-12-13 RX ORDER — MOMETASONE FUROATE 1 MG/G
CREAM TOPICAL DAILY
Qty: 15 G | Refills: 0 | Status: SHIPPED | OUTPATIENT
Start: 2024-12-13 | End: 2025-12-13

## 2024-12-13 RX ORDER — BLOOD-GLUCOSE SENSOR
EACH MISCELLANEOUS
Qty: 4 EACH | Refills: 3 | Status: SHIPPED | OUTPATIENT
Start: 2024-12-13

## 2024-12-13 RX ORDER — LEVOTHYROXINE SODIUM 125 UG/1
125 TABLET ORAL DAILY
Qty: 90 TABLET | Refills: 1 | Status: SHIPPED | OUTPATIENT
Start: 2024-12-13 | End: 2025-06-11

## 2024-12-13 RX ORDER — BLOOD-GLUCOSE,RECEIVER,CONT
EACH MISCELLANEOUS
Qty: 1 EACH | Refills: 0 | Status: SHIPPED | OUTPATIENT
Start: 2024-12-13

## 2024-12-13 ASSESSMENT — DERMATOLOGY QUALITY OF LIFE (QOL) ASSESSMENT
WHAT SINGLE SKIN CONDITION LISTED BELOW IS THE PATIENT ANSWERING THE QUALITY-OF-LIFE ASSESSMENT QUESTIONS ABOUT: ACTINIC KERATOSIS
RATE HOW EMOTIONALLY BOTHERED YOU ARE BY YOUR SKIN PROBLEM (FOR EXAMPLE, WORRY, EMBARRASSMENT, FRUSTRATION): 0 - NEVER BOTHERED
RATE HOW EMOTIONALLY BOTHERED YOU ARE BY YOUR SKIN PROBLEM (FOR EXAMPLE, WORRY, EMBARRASSMENT, FRUSTRATION): 0 - NEVER BOTHERED
RATE HOW BOTHERED YOU ARE BY SYMPTOMS OF YOUR SKIN PROBLEM (EG, ITCHING, STINGING BURNING, HURTING OR SKIN IRRITATION): 2
RATE HOW BOTHERED YOU ARE BY EFFECTS OF YOUR SKIN PROBLEMS ON YOUR ACTIVITIES (EG, GOING OUT, ACCOMPLISHING WHAT YOU WANT, WORK ACTIVITIES OR YOUR RELATIONSHIPS WITH OTHERS): 0 - NEVER BOTHERED
RATE HOW BOTHERED YOU ARE BY SYMPTOMS OF YOUR SKIN PROBLEM (EG, ITCHING, STINGING BURNING, HURTING OR SKIN IRRITATION): 2
RATE HOW BOTHERED YOU ARE BY EFFECTS OF YOUR SKIN PROBLEMS ON YOUR ACTIVITIES (EG, GOING OUT, ACCOMPLISHING WHAT YOU WANT, WORK ACTIVITIES OR YOUR RELATIONSHIPS WITH OTHERS): 0 - NEVER BOTHERED
DATE THE QUALITY-OF-LIFE ASSESSMENT WAS COMPLETED: 67177
WHAT SINGLE SKIN CONDITION LISTED BELOW IS THE PATIENT ANSWERING THE QUALITY-OF-LIFE ASSESSMENT QUESTIONS ABOUT: ACTINIC KERATOSIS

## 2024-12-17 ENCOUNTER — APPOINTMENT (OUTPATIENT)
Dept: DERMATOLOGY | Facility: CLINIC | Age: 79
End: 2024-12-17
Payer: MEDICARE

## 2024-12-17 DIAGNOSIS — L85.3 XEROSIS CUTIS: ICD-10-CM

## 2024-12-17 DIAGNOSIS — L57.0 ACTINIC KERATOSIS: ICD-10-CM

## 2024-12-17 DIAGNOSIS — D22.5 MELANOCYTIC NEVI OF TRUNK: ICD-10-CM

## 2024-12-17 DIAGNOSIS — L57.8 PHOTOAGING OF SKIN: Primary | ICD-10-CM

## 2024-12-17 DIAGNOSIS — Z85.828 PERSONAL HISTORY OF OTHER MALIGNANT NEOPLASM OF SKIN: ICD-10-CM

## 2024-12-17 DIAGNOSIS — L82.1 SEBORRHEIC KERATOSIS: ICD-10-CM

## 2024-12-17 DIAGNOSIS — Z12.83 ENCOUNTER FOR SCREENING FOR MALIGNANT NEOPLASM OF SKIN: ICD-10-CM

## 2024-12-17 DIAGNOSIS — D18.01 HEMANGIOMA OF SKIN: ICD-10-CM

## 2024-12-17 DIAGNOSIS — L81.4 LENTIGO: ICD-10-CM

## 2024-12-17 DIAGNOSIS — L21.9 SEBORRHEIC DERMATITIS: ICD-10-CM

## 2024-12-17 PROCEDURE — 1123F ACP DISCUSS/DSCN MKR DOCD: CPT | Performed by: DERMATOLOGY

## 2024-12-17 PROCEDURE — 99214 OFFICE O/P EST MOD 30 MIN: CPT | Performed by: DERMATOLOGY

## 2024-12-17 PROCEDURE — 17000 DESTRUCT PREMALG LESION: CPT | Performed by: DERMATOLOGY

## 2024-12-17 PROCEDURE — 17003 DESTRUCT PREMALG LES 2-14: CPT | Performed by: DERMATOLOGY

## 2024-12-17 PROCEDURE — 1159F MED LIST DOCD IN RCRD: CPT | Performed by: DERMATOLOGY

## 2024-12-17 RX ORDER — HYDROCORTISONE 25 MG/G
CREAM TOPICAL 2 TIMES DAILY
Qty: 20 G | Refills: 3 | Status: SHIPPED | OUTPATIENT
Start: 2024-12-17 | End: 2024-12-31

## 2024-12-17 NOTE — PROGRESS NOTES
Subjective     Edel Charles is a 79 y.o. female who presents for the following: Skin Check (Pt here for yearly FBSE. Hx of BCC.  Patient states she has lesion on left temple that has been present for a few months, it has been growing, no topicals have been applied, nor has it ever been treated).     Review of Systems:  No other skin or systemic complaints other than what is documented elsewhere in the note.    The following portions of the chart were reviewed this encounter and updated as appropriate:          Skin Cancer History  History of BCC      Specialty Problems          Dermatology Problems    Basal cell carcinoma     20+years             Objective   Well appearing patient in no apparent distress; mood and affect are within normal limits.    A focused skin examination was performed. All findings within normal limits unless otherwise noted below.    Assessment/Plan   1. Photoaging of skin  Mottled pigmentation with telangiectasias and brown reticular macules in sun exposed areas of the body.    The risk of chronic, cumulative sun damage and risk of development of skin cancer was reviewed today.   The importance of sun protection was reviewed: including the use of a broad spectrum sunscreen that protects against both UVA/UVB rays, with ingredients such as Zinc oxide or titanium dioxide, wearing sun protective clothing and sun avoidance. We reviewed the warning signs of non-melanoma skin cancer and ABCDEs of melanoma  Please follow up should you notice any new or changing pre-existing skin lesion.    2. Actinic keratosis (4)  Mid Forehead (2), Mid Tip of Nose, right ear (2)  Erythematous macules with gritty scale.    Lesions are due to cumulative sun damage over time and are pre-cancerous. They have a risk (although small in majority of patients) of developing into squamous cell carcinoma and therefore treatment recommendations were offered and discussed.   Treatments Discussed include LN2, photodynamic (blue  light) therapy & topical chemotherapy creams, risks and benefits of each.     The risks and benefits of LN2 were reviewed including incomplete removal, crusting, blister hypo and/or hyperpigmentation, scarring and recurrence. Pt elected for LN2 today    Destr of lesion - Mid Forehead (2), Mid Tip of Nose, right ear (2)  Complexity: simple    Destruction method: cryotherapy    Informed consent: discussed and consent obtained    Lesion destroyed using liquid nitrogen: Yes    Region frozen until ice ball extended beyond lesion: Yes    Cryotherapy cycles:  1  Outcome: patient tolerated procedure well with no complications    Post-procedure details: wound care instructions given      3. Seborrheic dermatitis  Head - Anterior (Face)  Erythematous scaly patches     The chronic and intermittently flaring nature of this skin condition was discussed with patient today.   This is due to a yeast that everyone has on their skin that results in redness, dryness and in some patients itching.    Various treatment options were reviewed including topical antifungals and topical steroids depending upon severity and symptoms. Patient advised we cannot cure this condition, but it can be controlled.     Begin the following treatment:  - hydrocortisone 2.5% cream. Patient to apply 2x daily x 2 wks then decrease to 2x/day 2 days per week. Can repeat full 2 week course as often as every 6-8 weeks as needed for flaring. Side effects of topical steroids includes, but is not limited to skin atrophy and dyspigmentation.    (Pt with history to allergy to miconazole powder - she is unaware of reaction, reports reaction to sulfa with rash)    Related Medications  hydrocortisone 2.5 % cream  Apply topically 2 times a day for 14 days.    4. Personal history of other malignant neoplasm of skin  Well healed scar at site of prior treatment without evidence of recurrence.    There is no evidence of recurrence on clinical examination today, reassurance  was provided to the patient. The importance of sun protection was reviewed with the patient including the use of a broad spectrum sunscreen that protects against both UVA/UVB rays, with ingredients such as Zinc oxide or titanium dioxide, wearing sun protective clothing and sun avoidance. Warning signs of non-melanoma skin cancer were reviewed. ABCDEs of melanoma reviewed. Patient to f/u should they notice any new or changing pre-existing skin lesion    Related Procedures  Follow Up In Dermatology - Established Patient    5. Hemangioma of skin  Cherry red papules    The benign nature of these skin lesions were reviewed, no treatment is necessary.   Please follow up for any new or pre-existing lesion that is changing in size, shape, color, becomes painful, tender, itches or bleed.    6. Lentigo  Scattered tan macules in sun-exposed areas.    These are benign skin lesions due to sun exposure. They will darken in response to sun exposure. They should be monitored for change in size, shape or color.  These lesions can be treated cosmetically with topical creams, liquid nitrogen and a variety of lasers.    7. Seborrheic keratosis  Brown, tan waxy macules and stuck on appearing papules and plaques    The benign nature of these skin lesions reviewed, reassure provided and no further treatment needed at this time.   These lesions can be removed, if symptomatic (itching, bleeding, rubbing on clothing, painful), otherwise removal is considered cosmetic.     8. Xerosis cutis  Fine, ashy, pale to white scale diffusely over skin.    Dry skin is common, often worse during the dry months of the year and may also worsen as we age.  A gentle skin care regimen includes:  -washing with a mild soap without fragrance such as Dove for sensitive skin, Cetaphil or Cerave.  -pat dry after washing and then apply a thick moisturizer such as Cerave cream or Cetaphil cream. Creams are thicker than lotions and often do a better job of restoring  the skin barrier.  -samples and coupons were provided today      9. Melanocytic nevi of trunk  Tan-brown symmetric macules and papules    Clinically benign appearing nevi, no treatment is necessary.  The importance of sun protection was reviewed: including the use of a broad spectrum sunscreen that protects against both UVA/UVB rays, with ingredients such as Zinc oxide or titanium dioxide, wearing sun protective clothing and sun avoidance.   ABCDEs of melanoma reviewed.  Please follow up should you notice any new or changing pre-existing skin lesion.    10. Encounter for screening for malignant neoplasm of skin  No suspicious lesions noted on examination today    The risk of chronic, cumulative sun damage and risk of development of skin cancer was reviewed today.   The importance of sun protection was reviewed: including the use of a broad spectrum sunscreen of at least SPF 30 that protects against both UVA/UVB rays, with ingredients such as Zinc oxide or titanium dioxide, wearing sun protective clothing and sun avoidance. We reviewed the warning signs of non-melanoma skin cancer and ABCDEs of melanoma  Please follow up should you notice any new or changing pre-existing skin lesion.        Nathanael Hillman MD  PGY4 Dermatology    Follow up in 1 year or sooner if needed    I was present for the entirety of the procedure(s).  I performed the procedure. I saw and evaluated the patient. I personally obtained the key and critical portions of the history and physical exam or was physically present for key and critical portions performed by the resident/fellow. I reviewed the resident/fellow's documentation and discussed the patient with the resident/fellow. I agree with the resident/fellow's medical decision making as documented in the note.    Alethea Fisher,

## 2024-12-30 ENCOUNTER — APPOINTMENT (OUTPATIENT)
Dept: PRIMARY CARE | Facility: CLINIC | Age: 79
End: 2024-12-30
Payer: MEDICARE

## 2024-12-30 PROCEDURE — RXMED WILLOW AMBULATORY MEDICATION CHARGE

## 2025-01-02 ENCOUNTER — PHARMACY VISIT (OUTPATIENT)
Dept: PHARMACY | Facility: CLINIC | Age: 80
End: 2025-01-02
Payer: COMMERCIAL

## 2025-01-06 PROCEDURE — RXMED WILLOW AMBULATORY MEDICATION CHARGE

## 2025-01-08 ENCOUNTER — PHARMACY VISIT (OUTPATIENT)
Dept: PHARMACY | Facility: CLINIC | Age: 80
End: 2025-01-08
Payer: COMMERCIAL

## 2025-01-11 ENCOUNTER — APPOINTMENT (OUTPATIENT)
Dept: PRIMARY CARE | Facility: CLINIC | Age: 80
End: 2025-01-11
Payer: MEDICARE

## 2025-01-11 VITALS
SYSTOLIC BLOOD PRESSURE: 124 MMHG | WEIGHT: 155 LBS | TEMPERATURE: 97.4 F | BODY MASS INDEX: 28.52 KG/M2 | DIASTOLIC BLOOD PRESSURE: 72 MMHG | HEIGHT: 62 IN

## 2025-01-11 DIAGNOSIS — E11.9 DIABETES MELLITUS, TYPE II, INSULIN DEPENDENT (MULTI): ICD-10-CM

## 2025-01-11 DIAGNOSIS — E03.9 HYPOTHYROIDISM, UNSPECIFIED TYPE: ICD-10-CM

## 2025-01-11 DIAGNOSIS — I10 PRIMARY HYPERTENSION: ICD-10-CM

## 2025-01-11 DIAGNOSIS — N18.31 STAGE 3A CHRONIC KIDNEY DISEASE (MULTI): ICD-10-CM

## 2025-01-11 DIAGNOSIS — F19.20 STEROID DEPENDENCE (MULTI): ICD-10-CM

## 2025-01-11 DIAGNOSIS — Z00.00 ROUTINE GENERAL MEDICAL EXAMINATION AT HEALTH CARE FACILITY: Primary | ICD-10-CM

## 2025-01-11 DIAGNOSIS — Z79.4 DIABETES MELLITUS, TYPE II, INSULIN DEPENDENT (MULTI): ICD-10-CM

## 2025-01-11 DIAGNOSIS — I50.32 CHRONIC DIASTOLIC HEART FAILURE: ICD-10-CM

## 2025-01-11 DIAGNOSIS — I50.22 HEART FAILURE WITH MID-RANGE EJECTION FRACTION: ICD-10-CM

## 2025-01-11 LAB
POC ALBUMIN /CREATININE RATIO MANUALLY ENTERED: <30 UG/MG CREAT
POC FINGERSTICK BLOOD GLUCOSE: 97 MG/DL (ref 70–100)
POC HEMOGLOBIN A1C: 8.8 % (ref 4.2–6.5)
POC URINE ALBUMIN: 30 MG/L
POC URINE CREATININE: 100 MG/DL

## 2025-01-11 ASSESSMENT — ACTIVITIES OF DAILY LIVING (ADL)
BATHING: INDEPENDENT
TAKING_MEDICATION: INDEPENDENT
MANAGING_FINANCES: INDEPENDENT
DRESSING: INDEPENDENT
GROCERY_SHOPPING: INDEPENDENT
DOING_HOUSEWORK: INDEPENDENT

## 2025-01-11 ASSESSMENT — ENCOUNTER SYMPTOMS
ACTIVITY CHANGE: 0
SHORTNESS OF BREATH: 0

## 2025-01-11 NOTE — ASSESSMENT & PLAN NOTE
Orders:    POCT glycosylated hemoglobin (Hb A1C) manually resulted    POCT Fingerstick Glucose manually resulted    Lipid panel; Future    Hemoglobin A1C; Future    POCT Albumin Random Urine manually resulted

## 2025-01-11 NOTE — ASSESSMENT & PLAN NOTE
Orders:    Lipid panel; Future    CBC; Future    Comprehensive Metabolic Panel; Future    Hemoglobin A1C; Future

## 2025-01-11 NOTE — PROGRESS NOTES
"Subjective   Reason for Visit: Kristin Charles is an 79 y.o. female here for a Medicare Wellness visit.     Past Medical, Surgical, and Family History reviewed and updated in chart.    Reviewed all medications by prescribing practitioner or clinical pharmacist (such as prescriptions, OTCs, herbal therapies and supplements) and documented in the medical record.    HPI    Patient presents today for follow up and her medicare wellness visit. No acute concerns today. She is currently taking 4 mg of Prednisone for PMR and Insulin dosing has been adjusted based on this. She is not currently taking Jardiance due to recurrent UTI.     She is currently taking Insulin 5 units as of last night and 4 mg of Prednisone.    She is taking Celebrex as needed for pain    Patient Care Team:  Melanie Muñoz DO as PCP - General  Melanie Muñoz DO as PCP - [employer] PCP  Cruz Fang MD as Consulting Physician (Cardiology)     Review of Systems   Constitutional:  Negative for activity change.   Respiratory:  Negative for shortness of breath.    Cardiovascular:  Negative for chest pain and leg swelling.       Objective   Vitals:  /72 (BP Location: Right arm, Patient Position: Sitting)   Temp 36.3 °C (97.4 °F)   Ht 1.575 m (5' 2\")   Wt 70.3 kg (155 lb)   BMI 28.35 kg/m²       Physical Exam  Constitutional:       Appearance: Normal appearance.   HENT:      Head: Normocephalic.   Cardiovascular:      Rate and Rhythm: Normal rate and regular rhythm.   Pulmonary:      Effort: Pulmonary effort is normal.      Breath sounds: Normal breath sounds.   Abdominal:      General: Abdomen is flat. Bowel sounds are normal.      Palpations: Abdomen is soft.   Skin:     General: Skin is warm.   Neurological:      General: No focal deficit present.      Mental Status: She is alert.   Psychiatric:         Mood and Affect: Mood normal.         Assessment & Plan  Diabetes mellitus, type II, insulin dependent " (Multi)    Orders:    POCT glycosylated hemoglobin (Hb A1C) manually resulted    POCT Fingerstick Glucose manually resulted    Lipid panel; Future    Hemoglobin A1C; Future    POCT Albumin Random Urine manually resulted    Routine general medical examination at health care facility    Orders:    1 Year Follow Up In Primary Care - Wellness Exam    1 Year Follow Up In Primary Care - Wellness Exam; Future    Primary hypertension    Orders:    Lipid panel; Future    CBC; Future    Comprehensive Metabolic Panel; Future    Hemoglobin A1C; Future    Hypothyroidism, unspecified type    Orders:    Tsh With Reflex To Free T4 If Abnormal; Future    Heart failure with mid-range ejection fraction  - chronic and stable       Chronic diastolic heart failure  - chronic and stable       Steroid dependence (Multi)  - PMR, on steroid taper       Stage 3a chronic kidney disease (Multi)  - chronic and stable       Last Lipid 2/2024  Last HbA1C 8.8% 1/11/2024   Last fasting glucose 1/11/2024   Last CBC, CMP, vitamin D level 11/9/2024  Last TSH 7/24/2024    Last colonoscopy 4/24/2024, no longer needs colonoscopy for screening purposes  Last Dexa 9/16/2024, normal    Influenza vaccination received 11/5/2024  Last COVID-19 booster 10/4/2023 (had COVID-19 fall 2024, and plans to get this winter)  RSV vaccination received 10/4/2023  Pneumococcal vaccination series complete after age 65  Tdap booster is recommended, patient to look into coverage  Shingrix vaccination is recommended, patient to look into coverage    Labs to be done prior to the next visit

## 2025-01-13 ENCOUNTER — PATIENT MESSAGE (OUTPATIENT)
Dept: PRIMARY CARE | Facility: CLINIC | Age: 80
End: 2025-01-13
Payer: MEDICARE

## 2025-01-13 DIAGNOSIS — M35.3 PMR (POLYMYALGIA RHEUMATICA) (MULTI): Primary | ICD-10-CM

## 2025-01-14 RX ORDER — CELECOXIB 200 MG/1
200 CAPSULE ORAL DAILY
Qty: 90 CAPSULE | Refills: 1 | Status: SHIPPED | OUTPATIENT
Start: 2025-01-14

## 2025-01-17 DIAGNOSIS — Z79.4 DIABETES MELLITUS, TYPE II, INSULIN DEPENDENT (MULTI): Primary | ICD-10-CM

## 2025-01-17 DIAGNOSIS — E11.9 DIABETES MELLITUS, TYPE II, INSULIN DEPENDENT (MULTI): Primary | ICD-10-CM

## 2025-01-20 DIAGNOSIS — M35.3 PMR (POLYMYALGIA RHEUMATICA) (MULTI): Primary | ICD-10-CM

## 2025-01-20 RX ORDER — PREDNISONE 1 MG/1
3 TABLET ORAL DAILY
Qty: 90 TABLET | Refills: 0 | Status: SHIPPED | OUTPATIENT
Start: 2025-01-20

## 2025-01-22 ENCOUNTER — APPOINTMENT (OUTPATIENT)
Dept: PHARMACY | Facility: HOSPITAL | Age: 80
End: 2025-01-22
Payer: MEDICARE

## 2025-01-22 DIAGNOSIS — Z79.4 DIABETES MELLITUS, TYPE II, INSULIN DEPENDENT (MULTI): Primary | ICD-10-CM

## 2025-01-22 DIAGNOSIS — E11.9 DIABETES MELLITUS, TYPE II, INSULIN DEPENDENT (MULTI): Primary | ICD-10-CM

## 2025-01-22 DIAGNOSIS — E11.9 DIABETES MELLITUS TYPE II, NON INSULIN DEPENDENT (MULTI): ICD-10-CM

## 2025-01-22 ASSESSMENT — ENCOUNTER SYMPTOMS: DIABETIC ASSOCIATED SYMPTOMS: 0

## 2025-01-22 NOTE — ASSESSMENT & PLAN NOTE
CONTINUE with Tresiba 6 units daily  Educate on hypo- and hyperglycemia  Glucose coming back down with dose increase to 6 units  FUV in 4 weeks

## 2025-01-22 NOTE — PROGRESS NOTES
"[facility] Pharmacy Consult  Kristin Charles \"Edel\" is a 79 y.o. female was referred to Clinical Pharmacy Team for a Pharmacy consult.  The patient was referred for their Diabetes.    Referring Provider: Melanie Muñoz DO    Subjective   Allergies   Allergen Reactions    Other Other    Sulfa (Sulfonamide Antibiotics) Unknown    Miconazole Rash    Neomycin-Bacitracin-Polymyxin Rash       [facility] - [place], [place] - [address]  [address] [facility] [address]  Phone: [phone] Fax: [phone]    [facility]  [address], [address]  [facility] [address]  Phone: [phone] Fax: [phone]    [facility]  [address], [address]  [facility] [address]  Phone: [phone] Fax: [phone]      Diabetes  She presents for her follow-up diabetic visit. She has type 2 diabetes mellitus. Her disease course has been stable. There are no hypoglycemic associated symptoms. There are no diabetic associated symptoms. There are no hypoglycemic complications. Symptoms are stable. There are no diabetic complications. Risk factors for coronary artery disease include diabetes mellitus. Current diabetic treatment includes insulin injections. She is compliant with treatment all of the time. Her home blood glucose trend is decreasing steadily.       Was seeing nutritionist but stopped due to unavailability. Pt provided the following glucose trends:    Review of Systems    Objective     There were no vitals taken for this visit.     LAB  Lab Results   Component Value Date    BILITOT 0.5 11/09/2024    CALCIUM 8.8 11/09/2024    CO2 29 11/09/2024     11/09/2024    CREATININE 1.00 11/09/2024    GLUCOSE 142 (H) 11/09/2024    ALKPHOS 47 11/09/2024    K 4.3 11/09/2024    PROT 6.1 (L) 11/09/2024     11/09/2024    AST 11 11/09/2024    ALT 11 11/09/2024    BUN 20 11/09/2024    ANIONGAP 9 (L) 11/09/2024    MG 2.03 06/18/2024    PHOS 3.3 10/07/2022    ALBUMIN 3.8 " "11/09/2024    GFRF CANCELED 05/27/2023    GFRMALE CANCELED 05/27/2023     Lab Results   Component Value Date    TRIG 136 11/15/2023    CHOL 171 11/15/2023    LDLCALC 90 11/15/2023    HDL 53.6 11/15/2023     Lab Results   Component Value Date    HGBA1C 8.8 (A) 01/11/2025       Current Outpatient Medications on File Prior to Visit   Medication Sig Dispense Refill    acetaminophen (Tylenol) 325 mg tablet Take 2 tablets (650 mg) by mouth every 6 hours if needed for moderate pain (4 - 6), mild pain (1 - 3), headaches or fever (temp greater than 38.0 C).      amoxicillin (Amoxil) 500 mg capsule Take 4 caps (2000 mg) 30-60mins prior to your dental appointment 4 capsule 5    aspirin 81 mg EC tablet Take 1 tablet (81 mg) by mouth once daily.      blood sugar diagnostic strip Use as directed to test blood sugar once daily 100 each 11    blood-glucose meter misc Use as directed to test blood sugar once daily 1 each 0    celecoxib (CeleBREX) 200 mg capsule Take 1 capsule (200 mg) by mouth once daily. 90 capsule 1    famotidine (Pepcid) 10 mg tablet Take 1 tablet (10 mg) by mouth once daily as needed for heartburn.      hydrocortisone 2.5 % cream Apply topically 2 times a day for 14 days. 20 g 3    insulin degludec (Tresiba FlexTouch) 100 unit/mL (3 mL) injection Inject 5 Units under the skin once daily at bedtime. Take as directed per insulin instructions. 15 mL 12    lancets 30 gauge misc Use as directed to test blood sugar once daily 100 each 11    lancets misc Use as directed to test blood sugar once daily 100 each 3    levothyroxine (Synthroid, Levoxyl) 125 mcg tablet Take 1 tablet (125 mcg) by mouth once daily. 90 tablet 1    metoprolol succinate XL (Toprol-XL) 25 mg 24 hr tablet Take 1 tablet (25 mg) by mouth once daily. Do not crush or chew. 90 tablet 3    mometasone (Elocon) 0.1 % cream Apply topically once daily. 15 g 0    pen needle, diabetic 31 gauge x 5/16\" needle Use as directed to inject insulin once daily 100 " each 11    predniSONE (Deltasone) 1 mg tablet Take 3 tablets (3 mg) by mouth once daily. 90 tablet 0    sacubitriL-valsartan (Entresto) 24-26 mg tablet Take 1 tablet by mouth once daily. 90 tablet 3    traZODone (Desyrel) 50 mg tablet Take 1 tablet (50 mg) by mouth as needed at bedtime.       No current facility-administered medications on file prior to visit.        HISTORICAL PHARMACOTHERAPY  -none    DRUG INTERACTIONS  - none      Assessment/Plan   Problem List Items Addressed This Visit       Diabetes mellitus, type II, insulin dependent (Multi) - Primary     CONTINUE with Tresiba 6 units daily  Educate on hypo- and hyperglycemia  Glucose coming back down with dose increase to 6 units  FUV in 4 weeks          Other Visit Diagnoses       Diabetes mellitus type II, non insulin dependent (Multi)                 Continue all meds under the continuation of care with the referring provider and clinical pharmacy team.    Luis Pelaez PharmD     Verbal consent to manage patient's drug therapy was obtained from [the patient and/or an individual authorized to act on behalf of a patient]. They were informed they may decline to participate or withdraw from participation in pharmacy services at any time.

## 2025-01-23 ENCOUNTER — TELEPHONE (OUTPATIENT)
Dept: PRIMARY CARE | Facility: CLINIC | Age: 80
End: 2025-01-23
Payer: MEDICARE

## 2025-01-23 DIAGNOSIS — Z79.4 DIABETES MELLITUS, TYPE II, INSULIN DEPENDENT (MULTI): Primary | ICD-10-CM

## 2025-01-23 DIAGNOSIS — E11.9 DIABETES MELLITUS, TYPE II, INSULIN DEPENDENT (MULTI): Primary | ICD-10-CM

## 2025-01-23 NOTE — TELEPHONE ENCOUNTER
Pt is calling stating she tried to go  her prescription for the freestyle Levon Sensor and reader but it was discontinued. Pt is asking if you can resend this in for her?     Pharmacy: [facility]   Phone Number: [phone]

## 2025-01-24 DIAGNOSIS — E11.9 DIABETES MELLITUS, TYPE II, INSULIN DEPENDENT (MULTI): Primary | ICD-10-CM

## 2025-01-24 DIAGNOSIS — Z79.4 DIABETES MELLITUS, TYPE II, INSULIN DEPENDENT (MULTI): Primary | ICD-10-CM

## 2025-01-24 RX ORDER — BLOOD-GLUCOSE SENSOR
EACH MISCELLANEOUS
Qty: 5 EACH | Refills: 3 | Status: SHIPPED | OUTPATIENT
Start: 2025-01-24

## 2025-01-24 RX ORDER — BLOOD-GLUCOSE,RECEIVER,CONT
EACH MISCELLANEOUS
Qty: 1 EACH | Refills: 0 | Status: SHIPPED | OUTPATIENT
Start: 2025-01-24

## 2025-01-24 NOTE — TELEPHONE ENCOUNTER
Pt also need her prescription for the Freestyle Levon reader as well can you resend that in for her?     FreeStyle Levon 3 [place]; Use as instructed.     Pharmacy: [facility]   Phone Number: [phone]

## 2025-02-04 DIAGNOSIS — M35.3 PMR (POLYMYALGIA RHEUMATICA) (MULTI): Primary | ICD-10-CM

## 2025-02-08 LAB
BASOPHILS # BLD AUTO: 52 CELLS/UL (ref 0–200)
BASOPHILS NFR BLD AUTO: 1 %
CRP SERPL-MCNC: NORMAL MG/L
EOSINOPHIL # BLD AUTO: 395 CELLS/UL (ref 15–500)
EOSINOPHIL NFR BLD AUTO: 7.6 %
ERYTHROCYTE [DISTWIDTH] IN BLOOD BY AUTOMATED COUNT: 12.8 % (ref 11–15)
ERYTHROCYTE [SEDIMENTATION RATE] IN BLOOD BY WESTERGREN METHOD: 6 MM/H
HCT VFR BLD AUTO: 44.9 % (ref 35–45)
HGB BLD-MCNC: 14.3 G/DL (ref 11.7–15.5)
LYMPHOCYTES # BLD AUTO: 1747 CELLS/UL (ref 850–3900)
LYMPHOCYTES NFR BLD AUTO: 33.6 %
MCH RBC QN AUTO: 31.1 PG (ref 27–33)
MCHC RBC AUTO-ENTMCNC: 31.8 G/DL (ref 32–36)
MCV RBC AUTO: 97.6 FL (ref 80–100)
MONOCYTES # BLD AUTO: 567 CELLS/UL (ref 200–950)
MONOCYTES NFR BLD AUTO: 10.9 %
NEUTROPHILS # BLD AUTO: 2439 CELLS/UL (ref 1500–7800)
NEUTROPHILS NFR BLD AUTO: 46.9 %
PLATELET # BLD AUTO: 152 THOUSAND/UL (ref 140–400)
PMV BLD REES-ECKER: 10.7 FL (ref 7.5–12.5)
RBC # BLD AUTO: 4.6 MILLION/UL (ref 3.8–5.1)
WBC # BLD AUTO: 5.2 THOUSAND/UL (ref 3.8–10.8)

## 2025-02-10 LAB
BASOPHILS # BLD AUTO: 52 CELLS/UL (ref 0–200)
BASOPHILS NFR BLD AUTO: 1 %
CRP SERPL-MCNC: <3 MG/L
EOSINOPHIL # BLD AUTO: 395 CELLS/UL (ref 15–500)
EOSINOPHIL NFR BLD AUTO: 7.6 %
ERYTHROCYTE [DISTWIDTH] IN BLOOD BY AUTOMATED COUNT: 12.8 % (ref 11–15)
ERYTHROCYTE [SEDIMENTATION RATE] IN BLOOD BY WESTERGREN METHOD: 6 MM/H
HCT VFR BLD AUTO: 44.9 % (ref 35–45)
HGB BLD-MCNC: 14.3 G/DL (ref 11.7–15.5)
LYMPHOCYTES # BLD AUTO: 1747 CELLS/UL (ref 850–3900)
LYMPHOCYTES NFR BLD AUTO: 33.6 %
MCH RBC QN AUTO: 31.1 PG (ref 27–33)
MCHC RBC AUTO-ENTMCNC: 31.8 G/DL (ref 32–36)
MCV RBC AUTO: 97.6 FL (ref 80–100)
MONOCYTES # BLD AUTO: 567 CELLS/UL (ref 200–950)
MONOCYTES NFR BLD AUTO: 10.9 %
NEUTROPHILS # BLD AUTO: 2439 CELLS/UL (ref 1500–7800)
NEUTROPHILS NFR BLD AUTO: 46.9 %
PLATELET # BLD AUTO: 152 THOUSAND/UL (ref 140–400)
PMV BLD REES-ECKER: 10.7 FL (ref 7.5–12.5)
RBC # BLD AUTO: 4.6 MILLION/UL (ref 3.8–5.1)
WBC # BLD AUTO: 5.2 THOUSAND/UL (ref 3.8–10.8)

## 2025-02-12 NOTE — PROGRESS NOTES
"Rheumatology Outpatient follow up Note    Subjective   Kristin Charles \"Crescencio" is a 79 y.o. female presenting today for Joint Pain.    History of Presenting Problem:     Rheum history: started having shoulder/hip pain and stiffness around 1/2024 and her CRP was elevated. She was initially given a medrol adam. She continued to have symptoms and in 3/15/2024 and she was started on 15 mg daily. After 10 days she self reduced to 10 mg for 2 weeks then she was instructed to go back to 15 mg. She is overall feeling better but continues to have more pain and stiffness in the morning. No headache, jaw pain or scalp tenderness.    She is feeling well on 3 mg . No shoulder pain or stiffness.  Denies stiffness in shoulders, hips. She couldn't tolerate smaller doses She has left upper arm. Shoulder pain with external rotation    Past Medical History:   Past Medical History:   Diagnosis Date    Abnormal ECG     Actinic keratosis     Acute candidiasis of vulva and vagina     Yeast infection of the vagina    Acute laryngitis 03/20/2018    Acute laryngitis    Lee's cyst of knee 04/06/2023    Basal cell carcinoma     20+years    Bitten or stung by nonvenomous insect and other nonvenomous arthropods, initial encounter 09/02/2015    Bug bite with infection    Candidiasis, unspecified 10/13/2017    Yeast infection    CHF (congestive heart failure)     Chronic ethmoidal sinusitis 11/24/2020    Chronic ethmoidal sinusitis    Chronic maxillary sinusitis 08/06/2020    Chronic maxillary sinusitis    Diabetes mellitus (Multi)     Disease of thyroid gland     LAYTON (dyspnea on exertion) 09/28/2023    Eczema     GERD (gastroesophageal reflux disease)     Hair loss 04/06/2023    Heart murmur     Heart valve disease     Laceration without foreign body of unspecified forearm, initial encounter 01/09/2022    Forearm laceration    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    Lumbago with sciatica, left side 03/29/2019    Chronic bilateral low back pain with " Called patient and notified her of her incorrect answer on her survey, she stated she just did it a few minutes  Advised her to reach out to [facility]"[employer]" or resubmit her patient survey as the medication will not be dispensed until its corrected  Pt verbalized understanding and stated she will do this tonight  left-sided sciatica    Lymphocytic colitis 05/05/2023    Colonoscopy: 4/24/2023    Nail disorder, unspecified 08/08/2023    Nasal turbinate hypertrophy 04/06/2023    Neoplasm of uncertain behavior of skin 08/08/2023    Obesity 01/19/2015    Onychomycosis 04/06/2023    Open wound of hip and thigh with tendon involvement 05/24/2018    Osteoarthritis 04/06/2023    Osteoarthritis, localized, knee 04/06/2023    Overweight (BMI 25.0-29.9) 04/06/2023    Personal history of diseases of the skin and subcutaneous tissue 01/09/2022    History of contact dermatitis    Personal history of diseases of the skin and subcutaneous tissue 09/02/2015    History of cellulitis    Personal history of other diseases of the musculoskeletal system and connective tissue     Personal history of arthritis    Personal history of other diseases of the nervous system and sense organs     History of cataract    Personal history of other diseases of the nervous system and sense organs 09/20/2019    History of sciatica    Personal history of other endocrine, nutritional and metabolic disease     History of hypothyroidism    Personal history of other infectious and parasitic diseases 09/23/2017    History of erysipelas    Personal history of other specified conditions 03/06/2020    History of epistaxis    Rash and other nonspecific skin eruption 06/15/2015    Skin rash    Rhinitis 10 years    Sciatica 12/11/2014    Status post joint replacement 06/19/2023    Strain of muscle, fascia and tendon of the posterior muscle group at thigh level, unspecified thigh, initial encounter 05/01/2018    Hamstring strain    Tinnitus 10 years    Vaginal discharge 04/06/2023    Varus deformity, not elsewhere classified, unspecified knee 03/06/2019    Acquired genu varum       Allergies:   Allergies   Allergen Reactions    Other Other    Sulfa (Sulfonamide Antibiotics) Unknown    Miconazole Rash    Neomycin-Bacitracin-Polymyxin Rash       Medications:   Current  "Outpatient Medications:     acetaminophen (Tylenol) 325 mg tablet, Take 2 tablets (650 mg) by mouth every 6 hours if needed for moderate pain (4 - 6), mild pain (1 - 3), headaches or fever (temp greater than 38.0 C)., Disp: , Rfl:     amoxicillin (Amoxil) 500 mg capsule, Take 4 caps (2000 mg) 30-60mins prior to your dental appointment, Disp: 4 capsule, Rfl: 5    aspirin 81 mg EC tablet, Take 1 tablet (81 mg) by mouth once daily., Disp: , Rfl:     blood sugar diagnostic strip, Use as directed to test blood sugar once daily, Disp: 100 each, Rfl: 11    blood-glucose meter misc, Use as directed to test blood sugar once daily, Disp: 1 each, Rfl: 0    celecoxib (CeleBREX) 200 mg capsule, Take 1 capsule (200 mg) by mouth once daily., Disp: 90 capsule, Rfl: 1    famotidine (Pepcid) 10 mg tablet, Take 1 tablet (10 mg) by mouth once daily as needed for heartburn., Disp: , Rfl:     FreeStyle Levon 3 [place] misc, Use as instructed, Disp: 1 each, Rfl: 0    FreeStyle Levon 3 Sensor device, Use as directed, Disp: 5 each, Rfl: 3    hydrocortisone 2.5 % cream, Apply topically 2 times a day for 14 days., Disp: 20 g, Rfl: 3    insulin degludec (Tresiba FlexTouch) 100 unit/mL (3 mL) injection, Inject 5 Units under the skin once daily at bedtime. Take as directed per insulin instructions., Disp: 15 mL, Rfl: 12    lancets 30 gauge misc, Use as directed to test blood sugar once daily, Disp: 100 each, Rfl: 11    lancets misc, Use as directed to test blood sugar once daily, Disp: 100 each, Rfl: 3    levothyroxine (Synthroid, Levoxyl) 125 mcg tablet, Take 1 tablet (125 mcg) by mouth once daily., Disp: 90 tablet, Rfl: 1    metoprolol succinate XL (Toprol-XL) 25 mg 24 hr tablet, Take 1 tablet (25 mg) by mouth once daily. Do not crush or chew., Disp: 90 tablet, Rfl: 3    mometasone (Elocon) 0.1 % cream, Apply topically once daily., Disp: 15 g, Rfl: 0    pen needle, diabetic 31 gauge x 5/16\" needle, Use as directed to inject insulin once daily, " "Disp: 100 each, Rfl: 11    predniSONE (Deltasone) 1 mg tablet, Take 3 tablets (3 mg) by mouth once daily., Disp: 90 tablet, Rfl: 0    sacubitriL-valsartan (Entresto) 24-26 mg tablet, Take 1 tablet by mouth once daily., Disp: 90 tablet, Rfl: 3    traZODone (Desyrel) 50 mg tablet, Take 1 tablet (50 mg) by mouth as needed at bedtime., Disp: , Rfl:     Review of Systems:   Constitutional: Denies fever, chills   Eyes: Denies dry eyes, pain in the eyes   ENT: Denies dry mouth, loss of taste, sores in the mouth  Cardiovascular: Denies chest pain, palpitations   Respiratory: Denies shortness of breath   Gastrointestinal: Denies heartburn   Integumentary: Denies photosensitivity, rash or lesions, Raynaud's   Neurological: Denies any numbness or tingling    MSK: As per HPI.     All 10 review of systems have been reviewed and are negative for complaint except as noted in the HPI    Objective   Physical Examination:  Vitals:    02/13/25 1314   BP: 133/81   Pulse: 55   Temp: 36.1 °C (97 °F)           Growth [employer]ile SmartLinks can only be used for patients less than 20 years old.  Ht Readings from Last 1 Encounters:   02/13/25 1.575 m (5' 2\")     Wt Readings from Last 1 Encounters:   02/13/25 69.9 kg (154 lb)       General - NAD, sitting up in chair, well-groomed, pleasant, AAOx3  Head: Normocephalic, atraumatic  Eyes - PERRLA, EOMI. No conjunctiva injection.   Cardiovascular - No murmurs or rubs.  Lungs - Symmetric chest expansion.   Skin - No rashes or ulcers. Skin warm and dry. No erythema on bilateral cheeks.  Extremities - No edema, cyanosis ,or clubbing  Neurological - Alert and oriented x 3,  grossly intact. No focal deficit.  Shoulders: left Shoulder pain with external rotation  Elbows: Full ROM, without pain, no swelling, warmth or tenderness.  Hips: Full ROM.  No malalignment.  Knees:  Full ROM, without pain, no swelling, warmth or point tenderness.           Laboratory Testing:  3/2024: CMP normal, CRP 1.08, ESR 10, CBC " "normal, RF-, CCP-  2/2024: CRP 1.25, ESR 26      Assessment/Plan   Kristin Charles \"Eedl\" is a 79 y.o. female with PMHx of PVD, aortic stenosis, HFrEF, HTN, allergic rhinitis, DM, Hypothyroidism, GERD CKD, Anxiety, OA, and lumbar radiculopathy who is presenting today as a Follow up for PMR    ##Polymyalgia rheumatica (PMR):   -Diagnosed by PCP, in ~2/2024. Shoulder/hip pain/stiffness and elevated CRP  and had prompt response to  steroids.   -Current dose of prednisone:3 mg daily  -Symptoms: Improved  -Steroid plan: Going down slowly. Discussed risks and benefits and considering Kevzara.   -Most recent ESR/CRP 2/2025 normal  -GCA symptoms: no headache, jaw pain or claudications. No new vision changes. Patient is aware and advised to go to ED if he experiences any GCA symptoms.  -Take 2000 units vitamin D and calcium supplements  -DEXA scan  normal 9/2024      The assessment and plan, risk and benefits were discussed with the patient. All of the patients questions were answered and patient agrees to the plan.      Mellissa Webster MD  Clinical   Department of Rheumatology   [facility]            "

## 2025-02-13 ENCOUNTER — APPOINTMENT (OUTPATIENT)
Facility: CLINIC | Age: 80
End: 2025-02-13
Payer: MEDICARE

## 2025-02-13 VITALS
DIASTOLIC BLOOD PRESSURE: 81 MMHG | BODY MASS INDEX: 28.34 KG/M2 | SYSTOLIC BLOOD PRESSURE: 133 MMHG | TEMPERATURE: 97 F | WEIGHT: 154 LBS | HEART RATE: 55 BPM | HEIGHT: 62 IN

## 2025-02-13 DIAGNOSIS — M85.80 OSTEOPENIA AFTER MENOPAUSE: ICD-10-CM

## 2025-02-13 DIAGNOSIS — Z78.0 OSTEOPENIA AFTER MENOPAUSE: ICD-10-CM

## 2025-02-13 DIAGNOSIS — Z79.52 CURRENT CHRONIC USE OF SYSTEMIC STEROIDS: ICD-10-CM

## 2025-02-13 DIAGNOSIS — M25.512 CHRONIC LEFT SHOULDER PAIN: ICD-10-CM

## 2025-02-13 DIAGNOSIS — M35.3 PMR (POLYMYALGIA RHEUMATICA) (MULTI): Primary | ICD-10-CM

## 2025-02-13 DIAGNOSIS — G89.29 CHRONIC LEFT SHOULDER PAIN: ICD-10-CM

## 2025-02-13 PROCEDURE — 1036F TOBACCO NON-USER: CPT | Performed by: STUDENT IN AN ORGANIZED HEALTH CARE EDUCATION/TRAINING PROGRAM

## 2025-02-13 PROCEDURE — 1160F RVW MEDS BY RX/DR IN RCRD: CPT | Performed by: STUDENT IN AN ORGANIZED HEALTH CARE EDUCATION/TRAINING PROGRAM

## 2025-02-13 PROCEDURE — 3075F SYST BP GE 130 - 139MM HG: CPT | Performed by: STUDENT IN AN ORGANIZED HEALTH CARE EDUCATION/TRAINING PROGRAM

## 2025-02-13 PROCEDURE — 1123F ACP DISCUSS/DSCN MKR DOCD: CPT | Performed by: STUDENT IN AN ORGANIZED HEALTH CARE EDUCATION/TRAINING PROGRAM

## 2025-02-13 PROCEDURE — 1159F MED LIST DOCD IN RCRD: CPT | Performed by: STUDENT IN AN ORGANIZED HEALTH CARE EDUCATION/TRAINING PROGRAM

## 2025-02-13 PROCEDURE — 99214 OFFICE O/P EST MOD 30 MIN: CPT | Performed by: STUDENT IN AN ORGANIZED HEALTH CARE EDUCATION/TRAINING PROGRAM

## 2025-02-13 PROCEDURE — 3079F DIAST BP 80-89 MM HG: CPT | Performed by: STUDENT IN AN ORGANIZED HEALTH CARE EDUCATION/TRAINING PROGRAM

## 2025-02-13 PROCEDURE — G2211 COMPLEX E/M VISIT ADD ON: HCPCS | Performed by: STUDENT IN AN ORGANIZED HEALTH CARE EDUCATION/TRAINING PROGRAM

## 2025-02-17 DIAGNOSIS — M35.3 PMR (POLYMYALGIA RHEUMATICA) (MULTI): ICD-10-CM

## 2025-02-17 RX ORDER — PREDNISONE 1 MG/1
3 TABLET ORAL DAILY
Qty: 90 TABLET | Refills: 2 | Status: SHIPPED | OUTPATIENT
Start: 2025-02-17

## 2025-02-19 ENCOUNTER — APPOINTMENT (OUTPATIENT)
Dept: PHARMACY | Facility: HOSPITAL | Age: 80
End: 2025-02-19
Payer: MEDICARE

## 2025-02-23 ASSESSMENT — DERMATOLOGY QUALITY OF LIFE (QOL) ASSESSMENT
RATE HOW EMOTIONALLY BOTHERED YOU ARE BY YOUR SKIN PROBLEM (FOR EXAMPLE, WORRY, EMBARRASSMENT, FRUSTRATION): 0 - NEVER BOTHERED
RATE HOW BOTHERED YOU ARE BY EFFECTS OF YOUR SKIN PROBLEMS ON YOUR ACTIVITIES (EG, GOING OUT, ACCOMPLISHING WHAT YOU WANT, WORK ACTIVITIES OR YOUR RELATIONSHIPS WITH OTHERS): 0 - NEVER BOTHERED
RATE HOW EMOTIONALLY BOTHERED YOU ARE BY YOUR SKIN PROBLEM (FOR EXAMPLE, WORRY, EMBARRASSMENT, FRUSTRATION): 0 - NEVER BOTHERED
DATE THE QUALITY-OF-LIFE ASSESSMENT WAS COMPLETED: 67258
RATE HOW BOTHERED YOU ARE BY SYMPTOMS OF YOUR SKIN PROBLEM (EG, ITCHING, STINGING BURNING, HURTING OR SKIN IRRITATION): 1
WHAT SINGLE SKIN CONDITION LISTED BELOW IS THE PATIENT ANSWERING THE QUALITY-OF-LIFE ASSESSMENT QUESTIONS ABOUT: ACTINIC KERATOSIS
RATE HOW BOTHERED YOU ARE BY EFFECTS OF YOUR SKIN PROBLEMS ON YOUR ACTIVITIES (EG, GOING OUT, ACCOMPLISHING WHAT YOU WANT, WORK ACTIVITIES OR YOUR RELATIONSHIPS WITH OTHERS): 0 - NEVER BOTHERED
WHAT SINGLE SKIN CONDITION LISTED BELOW IS THE PATIENT ANSWERING THE QUALITY-OF-LIFE ASSESSMENT QUESTIONS ABOUT: ACTINIC KERATOSIS
RATE HOW BOTHERED YOU ARE BY SYMPTOMS OF YOUR SKIN PROBLEM (EG, ITCHING, STINGING BURNING, HURTING OR SKIN IRRITATION): 1

## 2025-02-23 ASSESSMENT — PATIENT GLOBAL ASSESSMENT (PGA): WHAT IS THE PGA: PATIENT GLOBAL ASSESSMENT:  2 - MILD

## 2025-02-25 ENCOUNTER — APPOINTMENT (OUTPATIENT)
Dept: DERMATOLOGY | Facility: CLINIC | Age: 80
End: 2025-02-25
Payer: MEDICARE

## 2025-02-25 DIAGNOSIS — L82.0 INFLAMED SEBORRHEIC KERATOSIS: Primary | ICD-10-CM

## 2025-02-25 PROCEDURE — 1159F MED LIST DOCD IN RCRD: CPT | Performed by: DERMATOLOGY

## 2025-02-25 PROCEDURE — 1123F ACP DISCUSS/DSCN MKR DOCD: CPT | Performed by: DERMATOLOGY

## 2025-02-25 PROCEDURE — 17110 DESTRUCTION B9 LES UP TO 14: CPT | Performed by: STUDENT IN AN ORGANIZED HEALTH CARE EDUCATION/TRAINING PROGRAM

## 2025-02-25 NOTE — PROGRESS NOTES
"  Pharmacist Clinic: Cardiology Management    Kristin Charles \"Edel\" is a 79 y.o. female was referred to Clinical Pharmacy Team for Heart Failure management.     Referring Provider: Cruz Fang MD    THIS IS A FOLLOW UP PATIENT APPOINTMENT. AT LAST VISIT ON 11/27/25 WITH PHARMACIST (Juno Kleni).    Appointment was completed by Edel who was reached at primary number.    REVIEW OF PAST APPNT (IF APPLICABLE):   Edel is currently receiving Entresto through [email or web address]] with a renewal date of 4/15/25.  During last appointment Jardiance was stopped due to recurrent yeast infections.    Allergies   Allergen Reactions    Other Other    Sulfa (Sulfonamide Antibiotics) Unknown    Miconazole Rash    Neomycin-Bacitracin-Polymyxin Rash       Past Medical History:   Diagnosis Date    Abnormal ECG     Actinic keratosis     Acute candidiasis of vulva and vagina     Yeast infection of the vagina    Acute laryngitis 03/20/2018    Acute laryngitis    Lee's cyst of knee 04/06/2023    Basal cell carcinoma     20+years    Bitten or stung by nonvenomous insect and other nonvenomous arthropods, initial encounter 09/02/2015    Bug bite with infection    Candidiasis, unspecified 10/13/2017    Yeast infection    CHF (congestive heart failure)     Chronic ethmoidal sinusitis 11/24/2020    Chronic ethmoidal sinusitis    Chronic maxillary sinusitis 08/06/2020    Chronic maxillary sinusitis    Diabetes mellitus (Multi)     Disease of thyroid gland     LAYTON (dyspnea on exertion) 09/28/2023    Eczema     GERD (gastroesophageal reflux disease)     Hair loss 04/06/2023    Heart murmur     Heart valve disease     Laceration without foreign body of unspecified forearm, initial encounter 01/09/2022    Forearm laceration    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    Lumbago with sciatica, left side 03/29/2019    Chronic bilateral low back pain with left-sided sciatica    Lymphocytic colitis 05/05/2023    Colonoscopy: 4/24/2023    Nail disorder, unspecified " 08/08/2023    Nasal turbinate hypertrophy 04/06/2023    Neoplasm of uncertain behavior of skin 08/08/2023    Obesity 01/19/2015    Onychomycosis 04/06/2023    Open wound of hip and thigh with tendon involvement 05/24/2018    Osteoarthritis 04/06/2023    Osteoarthritis, localized, knee 04/06/2023    Overweight (BMI 25.0-29.9) 04/06/2023    Personal history of diseases of the skin and subcutaneous tissue 01/09/2022    History of contact dermatitis    Personal history of diseases of the skin and subcutaneous tissue 09/02/2015    History of cellulitis    Personal history of other diseases of the musculoskeletal system and connective tissue     Personal history of arthritis    Personal history of other diseases of the nervous system and sense organs     History of cataract    Personal history of other diseases of the nervous system and sense organs 09/20/2019    History of sciatica    Personal history of other endocrine, nutritional and metabolic disease     History of hypothyroidism    Personal history of other infectious and parasitic diseases 09/23/2017    History of erysipelas    Personal history of other specified conditions 03/06/2020    History of epistaxis    Rash and other nonspecific skin eruption 06/15/2015    Skin rash    Rhinitis 10 years    Sciatica 12/11/2014    Status post joint replacement 06/19/2023    Strain of muscle, fascia and tendon of the posterior muscle group at thigh level, unspecified thigh, initial encounter 05/01/2018    Hamstring strain    Tinnitus 10 years    Vaginal discharge 04/06/2023    Varus deformity, not elsewhere classified, unspecified knee 03/06/2019    Acquired genu varum       Current Outpatient Medications on File Prior to Visit   Medication Sig Dispense Refill    sacubitriL-valsartan (Entresto) 24-26 mg tablet Take 1 tablet by mouth once daily. (Patient taking differently: Take 0.5 tablets by mouth 2 times a day.) 90 tablet 3    acetaminophen (Tylenol) 325 mg tablet Take 2  "tablets (650 mg) by mouth every 6 hours if needed for moderate pain (4 - 6), mild pain (1 - 3), headaches or fever (temp greater than 38.0 C).      amoxicillin (Amoxil) 500 mg capsule Take 4 caps (2000 mg) 30-60mins prior to your dental appointment 4 capsule 5    aspirin 81 mg EC tablet Take 1 tablet (81 mg) by mouth once daily.      blood sugar diagnostic strip Use as directed to test blood sugar once daily 100 each 11    blood-glucose meter misc Use as directed to test blood sugar once daily 1 each 0    celecoxib (CeleBREX) 200 mg capsule Take 1 capsule (200 mg) by mouth once daily. 90 capsule 1    famotidine (Pepcid) 10 mg tablet Take 1 tablet (10 mg) by mouth once daily as needed for heartburn.      FreeStyle Levon 3 [place] misc Use as instructed 1 each 0    FreeStyle Levon 3 Sensor device Use as directed 5 each 3    hydrocortisone 2.5 % cream Apply topically 2 times a day for 14 days. 20 g 3    insulin degludec (Tresiba FlexTouch) 100 unit/mL (3 mL) injection Inject 5 Units under the skin once daily at bedtime. Take as directed per insulin instructions. 15 mL 12    lancets 30 gauge misc Use as directed to test blood sugar once daily 100 each 11    lancets misc Use as directed to test blood sugar once daily 100 each 3    levothyroxine (Synthroid, Levoxyl) 125 mcg tablet Take 1 tablet (125 mcg) by mouth once daily. 90 tablet 1    metoprolol succinate XL (Toprol-XL) 25 mg 24 hr tablet Take 1 tablet (25 mg) by mouth once daily. Do not crush or chew. 90 tablet 3    mometasone (Elocon) 0.1 % cream Apply topically once daily. 15 g 0    pen needle, diabetic 31 gauge x 5/16\" needle Use as directed to inject insulin once daily 100 each 11    predniSONE (Deltasone) 1 mg tablet Take 3 tablets (3 mg) by mouth once daily. 90 tablet 2    traZODone (Desyrel) 50 mg tablet Take 1 tablet (50 mg) by mouth as needed at bedtime.       No current facility-administered medications on file prior to visit.         RELEVANT LAB " "RESULTS:  Lab Results   Component Value Date    BILITOT 0.5 11/09/2024    CALCIUM 8.8 11/09/2024    CO2 29 11/09/2024     11/09/2024    CREATININE 1.00 11/09/2024    GLUCOSE 142 (H) 11/09/2024    ALKPHOS 47 11/09/2024    K 4.3 11/09/2024    PROT 6.1 (L) 11/09/2024     11/09/2024    AST 11 11/09/2024    ALT 11 11/09/2024    BUN 20 11/09/2024    ANIONGAP 9 (L) 11/09/2024    MG 2.03 06/18/2024    PHOS 3.3 10/07/2022    ALBUMIN 3.8 11/09/2024    GFRF CANCELED 05/27/2023    GFRMALE CANCELED 05/27/2023     Lab Results   Component Value Date    TRIG 136 11/15/2023    CHOL 171 11/15/2023    LDLCALC 90 11/15/2023    HDL 53.6 11/15/2023     No results found for: \"BMCBC\", \"CBCDIF\"     PHARMACEUTICAL ASSESSMENT:    Drug Interactions? No    Medication Documentation Review Audit       Reviewed by Emmy Matson LPN (Licensed Nurse) on 02/25/25 at 1015      Medication Order Taking? Sig Documenting Provider Last Dose Status   acetaminophen (Tylenol) 325 mg tablet [id number] No Take 2 tablets (650 mg) by mouth every 6 hours if needed for moderate pain (4 - 6), mild pain (1 - 3), headaches or fever (temp greater than 38.0 C). RAZA Sauceda-CNP Taking Active   amoxicillin (Amoxil) 500 mg capsule [id number] No Take 4 caps (2000 mg) 30-60mins prior to your dental appointment RAZA Sauceda-CNP Taking Active   aspirin 81 mg EC tablet [id number]  Take 1 tablet (81 mg) by mouth once daily. Historical Provider, MD  Active   blood sugar diagnostic strip [id number]  Use as directed to test blood sugar once daily Melanie Muñoz, DO  Active   blood-glucose meter misc [id number]  Use as directed to test blood sugar once daily Melanie Muñoz DO  Active   celecoxib (CeleBREX) 200 mg capsule [id number]  Take 1 capsule (200 mg) by mouth once daily. Melanie Muñoz, DO  Active   famotidine (Pepcid) 10 mg tablet [id number] No Take 1 tablet (10 mg) by mouth once daily as needed for heartburn. Historical Provider, MD Taking " "Active   FreeStyle Levon 3 [place] misc [id number]  Use as instructed Margarito Gonzales, DO  Active   FreeStyle Levon 3 Sensor device [id number]  Use as directed Melanie Muñoz DO  Active   hydrocortisone 2.5 % cream [id number]  Apply topically 2 times a day for 14 days. Elmer Hillman MD   24 2359   insulin degludec (Tresiba FlexTouch) 100 unit/mL (3 mL) injection [id number] No Inject 5 Units under the skin once daily at bedtime. Take as directed per insulin instructions. Melanie Muñoz DO Taking Active   lancets 30 gauge misc [id number]  Use as directed to test blood sugar once daily Melanie Muñoz DO  Active   lancets misc [id number]  Use as directed to test blood sugar once daily Melanie Muñoz DO  Active   levothyroxine (Synthroid, Levoxyl) 125 mcg tablet [id number]  Take 1 tablet (125 mcg) by mouth once daily. Melanie Muñoz DO  Active   metoprolol succinate XL (Toprol-XL) 25 mg 24 hr tablet [id number]  Take 1 tablet (25 mg) by mouth once daily. Do not crush or chew. Cruz Fang MD  Active   mometasone (Elocon) 0.1 % cream [id number]  Apply topically once daily. Melanie Muñoz DO  Active   pen needle, diabetic 31 gauge x 5/16\" needle [id number] No Use as directed to inject insulin once daily Melanie Muñoz DO Taking Active   predniSONE (Deltasone) 1 mg tablet [id number]  Take 3 tablets (3 mg) by mouth once daily. Mellissa Webster MD  Active   sacubitriL-valsartan (Entresto) 24-26 mg tablet [id number] No Take 1 tablet by mouth once daily. Cruz Fang MD Taking Active   traZODone (Desyrel) 50 mg tablet [id number] No Take 1 tablet (50 mg) by mouth as needed at bedtime. Historical Provider, MD Taking Active                    DISEASE MANAGEMENT ASSESSMENT:     CHF ASSESSMENT     Symptom/Staging:  -Most recent ejection fraction: 50-55%  -NYHA Stage: I    Results for orders placed during the hospital encounter of 24    Transthoracic echo (TTE) complete    [facility] " [place], [address] [place] [address]  Tel [phone] and Fax [phone]    TRANSTHORACIC ECHOCARDIOGRAM REPORT      Patient Name:      CHEL MCKEON     Reading Physician:    [id number] Raul Trivedi MD  Study Date:        7/22/2024            Ordering Provider:    [id number] MARSHA GONSALVES  DIONNA  MRN/PID:           [id number]             Fellow:  Accession#:        [id number]         Nurse:  Date of Birth/Age: 1945 / 79 years Sonographer:          Rj Sharam  [facility]  Gender:            F                    Additional Staff:  Height:            154.94 cm            Admit Date:  Weight:            68.95 kg             Admission Status:     Outpatient  BSA / BMI:         1.68 m2 / 28.72      Encounter#:           [id number]  kg/m2  Blood Pressure:    144/62 mmHg          Department Location:  [facility]    Study Type:    TRANSTHORACIC ECHO (TTE) COMPLETE  Diagnosis/ICD: Presence of prosthetic heart valve-Z95.2; Nonrheumatic aortic  (valve) stenosis-I35.0  Indication:    AS, s/p TAVR (26mm Haile) on 6/17/2024  CPT Code:      Echo Complete w Full Doppler-[id number]    Patient History:  Pertinent History: LAYTON, AS, s/p TAVR (26mm Haile) on 6/17/2024, CKD, GERD,  HTN.    Study Detail: The following Echo studies were performed: 2D, M-Mode, Doppler and  color flow. Technically challenging study due to the patient's  lack of cooperation, body habitus and active neuralgia pain.      PHYSICIAN INTERPRETATION:  Left Ventricle: The left ventricular systolic function is low normal, with a visually estimated ejection fraction of 50-55%. There are no regional wall motion abnormalities. The left ventricular cavity size is normal. Spectral Doppler shows an impaired relaxation pattern of left ventricular diastolic filling.  Left Atrium: The left atrium is mildly dilated.  Right Ventricle: The right ventricle is normal in size. There is normal right ventricular global systolic function.  Right Atrium: The right  atrium is normal in size.  Aortic Valve: There is a prosthetic aortic valve present. The aortic valve dimensionless index is 0.23. There is no evidence of aortic valve regurgitation. The peak instantaneous gradient of the aortic valve is 25.4 mmHg. The mean gradient of the aortic valve is 14.5 mmHg.  Mitral Valve: The mitral valve is normal in structure. There is mild to moderate mitral valve regurgitation.  Tricuspid Valve: The tricuspid valve is structurally normal. There is mild tricuspid regurgitation.  Pulmonic Valve: The pulmonic valve is not well visualized. There is no indication of pulmonic valve regurgitation.  Pericardium: There is no pericardial effusion noted.  Aorta: The aortic root is normal.  Systemic Veins: The inferior vena cava was not well visualized.  In comparison to the previous echocardiogram(s): No significant changes compared to prior cardiogram dated 6/18/2024.      CONCLUSIONS:  1. The left ventricular systolic function is low normal, with a visually estimated ejection fraction of 50-55%.  2. Spectral Doppler shows an impaired relaxation pattern of left ventricular diastolic filling.  3. There is normal right ventricular global systolic function.  4. The left atrium is mildly dilated.  5. Mild to moderate mitral valve regurgitation.  6. Hemodynamics are consistent with normal functioning of the bioprosthetic TAVR valve.    QUANTITATIVE DATA SUMMARY:  2D MEASUREMENTS:  Normal Ranges:  LAs:           4.26 cm   (2.7-4.0cm)  IVSd:          1.09 cm   (0.6-1.1cm)  LVPWd:         1.13 cm   (0.6-1.1cm)  LVIDd:         4.18 cm   (3.9-5.9cm)  LVIDs:         2.69 cm  LV Mass Index: 94.3 g/m2  LV % FS        35.5 %    LA VOLUME:  Normal Ranges:  LA Vol A4C:        54.9 ml    (22+/-6mL/m2)  LA Vol A2C:        63.0 ml  LA Vol BP:         59.3 ml  LA Vol Index A4C:  32.7 ml/m2  LA Vol Index A2C:  37.5 ml/m2  LA Vol Index BP:   35.3 ml/m2  LA Area A4C:       18.5 cm2  LA Area A2C:       20.0 cm2  LA  Major Axis A4C: 5.3 cm  LA Major Axis A2C: 5.4 cm  LA Vol A4C:        51.9 ml  LA Vol A2C:        59.4 ml    AORTA MEASUREMENTS:  Normal Ranges:  Asc Ao, d: 3.60 cm (2.1-3.4cm)    LV SYSTOLIC FUNCTION BY 2D PLANIMETRY (MOD):  Normal Ranges:  EF-A4C View:    49 % (>=55%)  EF-A2C View:    65 %  EF-Visual:      53 %  LV EF Reported: 53 %    LV DIASTOLIC FUNCTION:  Normal Ranges:  MV Peak E:        0.68 m/s    (0.7-1.2 m/s)  MV Peak A:        1.15 m/s    (0.42-0.7 m/s)  E/A Ratio:        0.59        (1.0-2.2)  MV e'             0.090 m/s   (>8.0)  MV lateral e'     0.12 m/s  MV medial e'      0.06 m/s  MV A Dur:         211.07 msec  E/e' Ratio:       7.53        (<8.0)  PulmV Sys Elias:    49.38 cm/s  PulmV Vargas Elias:   27.00 cm/s  PulmV S/D Elias:    1.83  PulmV A Revs Elias: 26.16 cm/s    MITRAL VALVE:  Normal Ranges:  MV DT: 349 msec (150-240msec)    AORTIC VALVE:  Normal Ranges:  AoV Vmax:                2.52 m/s  (<=1.7m/s)  AoV Peak P.4 mmHg (<20mmHg)  AoV Mean P.5 mmHg (1.7-11.5mmHg)  LVOT Max Elias:            0.72 m/s  (<=1.1m/s)  AoV VTI:                 67.61 cm  (18-25cm)  LVOT VTI:                15.65 cm  AoV Dimensionless Index: 0.23      RIGHT VENTRICLE:  RV Basal 3.30 cm  RV Mid   2.30 cm  RV Major 6.5 cm  TAPSE:   20.0 mm  RV s'    0.19 m/s    TRICUSPID VALVE/RVSP:  Normal Ranges:  Peak TR Velocity: 2.36 m/s  RV Syst Pressure: 25.3 mmHg (< 30mmHg)  IVC Diam:         1.50 cm    PULMONIC VALVE:  Normal Ranges:  PV Max Elias: 0.9 m/s  (0.6-0.9m/s)  PV Max PG:  3.1 mmHg    Pulmonary Veins:  PulmV A Revs Elias: 26.16 cm/s  PulmV Vargas Elias:   27.00 cm/s  PulmV S/D Elias:    1.83  PulmV Sys Elias:    49.38 cm/s      [id number] Raul Trivedi MD  Electronically signed on 2024 at 11:26:02 AM        ** Final **      Guideline-Directed Medical Therapy:  -ARNI: Yes, describe: Entresto 12-13mg BID  -Beta Blocker: Yes, describe: metoprolol succinate 25mg daily  -MRA: No  -SGLT2i: No - recurrent  yeast infections    Secondary Prevention:  -The ASCVD Risk score (Jean-Paul BEY, et al., 2019) failed to calculate for the following reasons:    The 2019 ASCVD risk score is only valid for ages 40 to 79   -Aspirin 81mg? yes  -Statin?: No  -HTN?: Yes, describe: controlled     CURRENT PHARMACOTHERAPY:   Entresto 24-26mg 0.5 tablet BID  Metoprolol succinate 25mg daily    Affordability: [facility]  Adherence/Compliance: reports adherence  Adverse Effects: none reproted    Monitoring Weights at Home: Yes  Home Weight Recordings: 150lbs    Past In Office Weight Readings:   Wt Readings from Last 6 Encounters:   02/13/25 69.9 kg (154 lb)   01/11/25 70.3 kg (155 lb)   11/14/24 69.8 kg (153 lb 12.8 oz)   11/12/24 70.3 kg (155 lb)   10/01/24 68.5 kg (151 lb)   09/05/24 69.9 kg (154 lb)       Monitoring Blood Pressure at Home: Yes  Home BP Recordings: 120s/70s everything <140/90    Past In Office BP Readings:   BP Readings from Last 6 Encounters:   02/13/25 133/81   01/11/25 124/72   11/14/24 124/77   11/12/24 124/70   10/01/24 120/70   09/05/24 102/69       HEALTH MANAGEMENT    Maintaining fluid restriction (<2 L/day): N/A  Edema/swelling: No  Shortness of breath: No  Trouble sleeping/lying down: No  Dry/hacking cough: No  Recent Hospitalizations: No    EDUCATION/COUNSELING:   - Counseled patient on MOA, expectations, duration of therapy, contraindications, administration, and monitoring parameters  - Counseled patient on lifestyle modifications that can decrease your risk of having complications (smoking cessation, losing weight, daily weights, vaccines)  - Counseled patient on fluid intake and weight management. Recommended to not consume more than 2 liters of fliuids per day. If they have gained more than 2-3 pounds within a 24 hours period (or 5 pounds in a week), contact their cardiologist  - Answered all patient questions and concerns       DISCUSSION/NOTES:   Reports adherence to all medications at this time. Noted no issues  splitting Entresto tablets.   Blood pressure remains stable and in goal.  No worsening signs of CHF noted.    ASSESSMENT:    Assessment/Plan   Problem List Items Addressed This Visit       Heart failure with mid-range ejection fraction     Tolerating 2 GDMT medications at this time.  Will not rechallenge SGLT2 due to yeast infections.  No dose adjustments needed based on kidney and liver function.         Relevant Orders    Referral to Clinical Pharmacy      Patient Assistance Program (PAP) - RENEWAL     Per regulation, patient is due for their annual renewal for their  PAP application. Patient's application is due for renewal by 4/15/25. Patient understands that if proper documentation is not received before renewal date, they will no longer be able to receive approved medication(s) free of charge.     Application for program to be submitted for the following medications: [facility] Permanent Address: [facility]   Prescription Insurance:   Yes   Members of Household: 1   Files Taxes: Yes       Patient will be email financial information to pharmacist directly at [email or web address].    Patient verbally reports monthly or yearly income which is less than 400% federal poverty level    Patient aware this process may take up to 6 weeks.     If approved medication must be filled through [facility] and MEDICATION WILL BE MAILED TO PATIENT.     RECOMMENDATIONS/PLAN:    CONTINUE  Entresto 24-26mg 0.5 tablet BID  Metoprolol succinate 25mg daily    Last Appnt with Referring Provider: 8/26/24  Next Appnt with Referring Provider: 8/29/25  Clinical Pharmacist follow up: 5/28/25  VAF/Application Expiration: Yes    Date: 4/15/25  Type of Encounter: Virtual    Juno Klein, PharmNATALY    Verbal consent to manage patient's drug therapy was obtained from the patient . They were informed they may decline to participate or withdraw from participation in pharmacy services at any time.    Continue all  meds under the continuation of care with the referring provider and clinical pharmacy team.

## 2025-02-25 NOTE — PROGRESS NOTES
Subjective     Edel Charles is a 79 y.o. female who presents for the following: Suspicious Skin Lesion (Lesion on the left frontal hairline. Treated 12/17/24 with LN2 and did not resolve. Lesion is irritated, red and gets caught on brush when combing hair. ).     Review of Systems:  No other skin or systemic complaints other than what is documented elsewhere in the note.    The following portions of the chart were reviewed this encounter and updated as appropriate:          Skin Cancer History  No skin cancer on file.      Specialty Problems          Dermatology Problems    Basal cell carcinoma     20+years             Objective   Well appearing patient in no apparent distress; mood and affect are within normal limits.    A focused skin examination was performed of the frontal scalp. All findings within normal limits unless otherwise noted below.    Assessment/Plan   1. Inflamed seborrheic keratosis  Left Forehead  Brown, tan, waxy, stuck on appearing plaque with surrounding erythema    The benign nature of these skin lesions reviewed, reassure provided and no further treatment needed at this time.   Given that the lesion is symptomatic (itching, rubbing when patient reid her hair), will treat with LN2 today. The risks and benefits of LN2 were reviewed including incomplete removal, crusting, blister hypo and/or hyperpigmentation, scarring and recurrence.        Destr of lesion - Left Forehead  Complexity: simple    Destruction method: cryotherapy    Informed consent: discussed and consent obtained    Lesion destroyed using liquid nitrogen: Yes    Region frozen until ice ball extended beyond lesion: Yes    Cryotherapy cycles:  2  Outcome: patient tolerated procedure well with no complications    Post-procedure details: wound care instructions given        RTC as scheduled for [facility] ServicesREBECCA Holley MD   PGY-4, Dermatology    I was present for the entirety of the procedure(s).  I performed the procedure. I saw and  evaluated the patient. I personally obtained the key and critical portions of the history and physical exam or was physically present for key and critical portions performed by the resident/fellow. I reviewed the resident/fellow's documentation and discussed the patient with the resident/fellow. I agree with the resident/fellow's medical decision making as documented in the note.    Alethea Fisher, DO

## 2025-02-26 ENCOUNTER — TELEMEDICINE (OUTPATIENT)
Dept: PHARMACY | Facility: HOSPITAL | Age: 80
End: 2025-02-26
Payer: MEDICARE

## 2025-02-26 ENCOUNTER — APPOINTMENT (OUTPATIENT)
Dept: PHARMACY | Facility: HOSPITAL | Age: 80
End: 2025-02-26
Payer: MEDICARE

## 2025-02-26 DIAGNOSIS — I50.22 HEART FAILURE WITH MID-RANGE EJECTION FRACTION: ICD-10-CM

## 2025-02-26 DIAGNOSIS — Z79.4 DIABETES MELLITUS, TYPE II, INSULIN DEPENDENT (MULTI): ICD-10-CM

## 2025-02-26 DIAGNOSIS — E11.9 DIABETES MELLITUS TYPE II, NON INSULIN DEPENDENT (MULTI): Primary | ICD-10-CM

## 2025-02-26 DIAGNOSIS — E11.9 DIABETES MELLITUS, TYPE II, INSULIN DEPENDENT (MULTI): ICD-10-CM

## 2025-02-26 NOTE — ASSESSMENT & PLAN NOTE
Tolerating 2 GDMT medications at this time.  Will not rechallenge SGLT2 due to yeast infections.  No dose adjustments needed based on kidney and liver function.

## 2025-02-28 ASSESSMENT — ENCOUNTER SYMPTOMS: DIABETIC ASSOCIATED SYMPTOMS: 0

## 2025-02-28 NOTE — PROGRESS NOTES
"[facility] Pharmacy Consult  Kristin Charles \"Eedl\" is a 79 y.o. female was referred to Clinical Pharmacy Team for a Pharmacy consult.  The patient was referred for their Diabetes.    Referring Provider: Melanie Muñoz DO    Subjective   Allergies   Allergen Reactions    Other Other    Sulfa (Sulfonamide Antibiotics) Unknown    Miconazole Rash    Neomycin-Bacitracin-Polymyxin Rash       [facility] - [place], [place] - [address]  [address] [facility] [address]  Phone: [phone] Fax: [phone]    [facility]  [address], [address]  [facility] [address]  Phone: [phone] Fax: [phone]    [facility]  [address], [address]  [facility] [address]  Phone: [phone] Fax: [phone]      Diabetes  She presents for her follow-up diabetic visit. She has type 2 diabetes mellitus. Her disease course has been stable. There are no hypoglycemic associated symptoms. There are no diabetic associated symptoms. There are no hypoglycemic complications. Symptoms are stable. There are no diabetic complications.     Pt here for a demonstration on how to put on her Levon 3 CGM.   Review of Systems    Objective     There were no vitals taken for this visit.     LAB  Lab Results   Component Value Date    BILITOT 0.5 11/09/2024    CALCIUM 8.8 11/09/2024    CO2 29 11/09/2024     11/09/2024    CREATININE 1.00 11/09/2024    GLUCOSE 142 (H) 11/09/2024    ALKPHOS 47 11/09/2024    K 4.3 11/09/2024    PROT 6.1 (L) 11/09/2024     11/09/2024    AST 11 11/09/2024    ALT 11 11/09/2024    BUN 20 11/09/2024    ANIONGAP 9 (L) 11/09/2024    MG 2.03 06/18/2024    PHOS 3.3 10/07/2022    ALBUMIN 3.8 11/09/2024    GFRF CANCELED 05/27/2023    GFRMALE CANCELED 05/27/2023     Lab Results   Component Value Date    TRIG 136 11/15/2023    CHOL 171 11/15/2023    LDLCALC 90 11/15/2023    HDL 53.6 11/15/2023     Lab Results   Component Value Date    HGBA1C 8.8 (A) 01/11/2025 " "      Current Outpatient Medications on File Prior to Visit   Medication Sig Dispense Refill    acetaminophen (Tylenol) 325 mg tablet Take 2 tablets (650 mg) by mouth every 6 hours if needed for moderate pain (4 - 6), mild pain (1 - 3), headaches or fever (temp greater than 38.0 C).      amoxicillin (Amoxil) 500 mg capsule Take 4 caps (2000 mg) 30-60mins prior to your dental appointment 4 capsule 5    aspirin 81 mg EC tablet Take 1 tablet (81 mg) by mouth once daily.      blood sugar diagnostic strip Use as directed to test blood sugar once daily 100 each 11    blood-glucose meter misc Use as directed to test blood sugar once daily 1 each 0    celecoxib (CeleBREX) 200 mg capsule Take 1 capsule (200 mg) by mouth once daily. 90 capsule 1    famotidine (Pepcid) 10 mg tablet Take 1 tablet (10 mg) by mouth once daily as needed for heartburn.      FreeStyle Levon 3 [place] misc Use as instructed 1 each 0    FreeStyle Levon 3 Sensor device Use as directed 5 each 3    hydrocortisone 2.5 % cream Apply topically 2 times a day for 14 days. 20 g 3    insulin degludec (Tresiba FlexTouch) 100 unit/mL (3 mL) injection Inject 5 Units under the skin once daily at bedtime. Take as directed per insulin instructions. 15 mL 12    lancets 30 gauge misc Use as directed to test blood sugar once daily 100 each 11    lancets misc Use as directed to test blood sugar once daily 100 each 3    levothyroxine (Synthroid, Levoxyl) 125 mcg tablet Take 1 tablet (125 mcg) by mouth once daily. 90 tablet 1    metoprolol succinate XL (Toprol-XL) 25 mg 24 hr tablet Take 1 tablet (25 mg) by mouth once daily. Do not crush or chew. 90 tablet 3    mometasone (Elocon) 0.1 % cream Apply topically once daily. 15 g 0    pen needle, diabetic 31 gauge x 5/16\" needle Use as directed to inject insulin once daily 100 each 11    predniSONE (Deltasone) 1 mg tablet Take 3 tablets (3 mg) by mouth once daily. 90 tablet 2    sacubitriL-valsartan (Entresto) 24-26 mg tablet " Take 1 tablet by mouth once daily. (Patient taking differently: Take 0.5 tablets by mouth 2 times a day.) 90 tablet 3    traZODone (Desyrel) 50 mg tablet Take 1 tablet (50 mg) by mouth as needed at bedtime.       No current facility-administered medications on file prior to visit.          Assessment/Plan   Problem List Items Addressed This Visit       Heart failure with mid-range ejection fraction    Diabetes mellitus, type II, insulin dependent (Multi)     CONTINUE all meds as prescribed  Pt demonstrated understanding of applying Levon 3 sensor verbally  Answered pt's question regarding CGM use  FUV in 2 weeks          Other Visit Diagnoses       Diabetes mellitus type II, non insulin dependent (Multi)    -  Primary    Relevant Orders    Referral to Clinical Pharmacy             Continue all meds under the continuation of care with the referring provider and clinical pharmacy team.    Luis Pelaez PharmD     Verbal consent to manage patient's drug therapy was obtained from [the patient and/or an individual authorized to act on behalf of a patient]. They were informed they may decline to participate or withdraw from participation in pharmacy services at any time.

## 2025-02-28 NOTE — ASSESSMENT & PLAN NOTE
CONTINUE all meds as prescribed  Pt demonstrated understanding of applying Levon 3 sensor verbally  Answered pt's question regarding CGM use  FUV in 2 weeks

## 2025-03-04 PROCEDURE — RXMED WILLOW AMBULATORY MEDICATION CHARGE

## 2025-03-05 ASSESSMENT — ENCOUNTER SYMPTOMS
HUNGER: 0
DIZZINESS: 0
SPEECH DIFFICULTY: 0
NERVOUS/ANXIOUS: 0
CONFUSION: 0
SEIZURES: 0
BLACKOUTS: 0
HEADACHES: 0
TREMORS: 0
SWEATS: 0

## 2025-03-06 ENCOUNTER — PHARMACY VISIT (OUTPATIENT)
Dept: PHARMACY | Facility: CLINIC | Age: 80
End: 2025-03-06
Payer: COMMERCIAL

## 2025-03-06 ASSESSMENT — ENCOUNTER SYMPTOMS
NERVOUS/ANXIOUS: 0
DIZZINESS: 0
HEADACHES: 0
SWEATS: 0
SEIZURES: 0
BLACKOUTS: 0
CONFUSION: 0
HUNGER: 0
TREMORS: 0
SPEECH DIFFICULTY: 0

## 2025-03-06 NOTE — PROGRESS NOTES
"Subjective   Patient ID: [id number]     Kristin Charles \"Crescencio" is a 79 y.o. female who presents for extreme glucose fluctuations.    Diabetes  She has type 2 diabetes mellitus. No MedicAlert identification noted. The initial diagnosis of diabetes was made 6 months ago. Pertinent negatives for hypoglycemia include no confusion, dizziness, headaches, hunger, mood changes, nervousness/anxiousness, pallor, seizures, sleepiness, speech difficulty, sweats or tremors. Pertinent negatives for hypoglycemia complications include no blackouts, no hospitalization, no nocturnal hypoglycemia, no required assistance and no required glucagon injection. Symptoms are worsening. Pertinent negatives for diabetic complications include no CVA. Current diabetic treatment includes diet and insulin injections. She is compliant with treatment all of the time. She is currently taking insulin at bedtime. Insulin injections are given by patient. Rotation sites for injection include the abdominal wall. Her weight is stable. She is following a generally healthy diet. Meal planning includes avoidance of concentrated sweets. She has had a previous visit with a dietitian. She participates in exercise daily. She monitors blood glucose at home 3-4 x per day. Blood glucose monitoring compliance is excellent. Her home blood glucose trend is increasing steadily. Her breakfast blood glucose is taken between 6-7 am. Her breakfast blood glucose range is generally 110-130 mg/dl. Her lunch blood glucose is taken between 12-1 pm. Her dinner blood glucose is taken between 6-7 pm. Her dinner blood glucose range is generally 180-200 mg/dl. Her bedtime blood glucose is taken between 9-10 pm. Her bedtime blood glucose range is generally 180-200 mg/dl. Her overall blood glucose range is >200 mg/dl. She sees a podiatrist.Eye exam is current.     Kristin presents due to concerns about her Levon sensing a glucose of 69 this morning despite every other level over " 200    Review of Systems   Skin:  Negative for pallor.   Neurological:  Negative for dizziness, tremors, seizures, speech difficulty and headaches.   Psychiatric/Behavioral:  Negative for confusion. The patient is not nervous/anxious.      CARDIO- No chest pain or pressure, nausea, diaphoresis, paresthesias, dizziness, or syncope with or without exertion  GI-No blood in stool, tarry stools, pain, vomiting, heartburn, constipation or diarrhea  PULM-No wheezing, coughing or shortness of breath  UROL-No frequency, urgency, blood in urine, or incontinence; nocturia not present    Objective     /65 (BP Location: Left arm, Patient Position: Sitting)   Temp 36.7 °C (98 °F) (Oral)   Wt 71.5 kg (157 lb 10.1 oz)   BMI 28.83 kg/m²      Physical Exam  Neck-supple without lymphadenopathy or thyromegaly; no carotid bruits  Heart- regular rate and rhythm, normal s1 and s2 without murmur or gallop; no edema  Lungs-clear to auscultation  Abdomen-soft, positive bowel sounds, without masses, HSmegaly or pain     Assessment/Plan     Problem List Items Addressed This Visit       Diabetes mellitus, type II, insulin dependent (Multi) - Primary    Relevant Orders    Comprehensive Metabolic Panel    Hemoglobin A1C    Thyroid Stimulating Hormone    Albumin-Creatinine Ratio, Urine Random    Referral to Endocrinology     Follow up fasting (no alcohol for 48 hours and just water for 14 hours) in three months for your next routine appointment.  In general, take any medications on schedule (except for types of Insulin).      Marcus Muñoz MD      no

## 2025-03-07 ENCOUNTER — OFFICE VISIT (OUTPATIENT)
Dept: PRIMARY CARE | Facility: CLINIC | Age: 80
End: 2025-03-07
Payer: MEDICARE

## 2025-03-07 VITALS
BODY MASS INDEX: 28.83 KG/M2 | SYSTOLIC BLOOD PRESSURE: 129 MMHG | TEMPERATURE: 98 F | WEIGHT: 157.63 LBS | DIASTOLIC BLOOD PRESSURE: 65 MMHG

## 2025-03-07 DIAGNOSIS — Z79.4 DIABETES MELLITUS, TYPE II, INSULIN DEPENDENT (MULTI): Primary | ICD-10-CM

## 2025-03-07 DIAGNOSIS — E11.9 DIABETES MELLITUS, TYPE II, INSULIN DEPENDENT (MULTI): Primary | ICD-10-CM

## 2025-03-07 PROCEDURE — 1123F ACP DISCUSS/DSCN MKR DOCD: CPT | Performed by: FAMILY MEDICINE

## 2025-03-07 PROCEDURE — 3078F DIAST BP <80 MM HG: CPT | Performed by: FAMILY MEDICINE

## 2025-03-07 PROCEDURE — 3074F SYST BP LT 130 MM HG: CPT | Performed by: FAMILY MEDICINE

## 2025-03-07 PROCEDURE — 99213 OFFICE O/P EST LOW 20 MIN: CPT | Performed by: FAMILY MEDICINE

## 2025-03-07 PROCEDURE — 1159F MED LIST DOCD IN RCRD: CPT | Performed by: FAMILY MEDICINE

## 2025-03-08 LAB
ALBUMIN SERPL-MCNC: NORMAL G/DL
ALP SERPL-CCNC: NORMAL U/L
ALT SERPL-CCNC: NORMAL U/L
ANION GAP SERPL CALCULATED.4IONS-SCNC: NORMAL MMOL/L
AST SERPL-CCNC: NORMAL U/L
BILIRUB SERPL-MCNC: NORMAL MG/DL
BUN SERPL-MCNC: NORMAL MG/DL
CALCIUM SERPL-MCNC: NORMAL MG/DL
CHLORIDE SERPL-SCNC: NORMAL MMOL/L
CO2 SERPL-SCNC: NORMAL MMOL/L
CREAT SERPL-MCNC: NORMAL MG/DL
EGFRCR SERPLBLD CKD-EPI 2021: NORMAL ML/MIN/{1.73_M2}
EST. AVERAGE GLUCOSE BLD GHB EST-MCNC: 209 MG/DL
EST. AVERAGE GLUCOSE BLD GHB EST-SCNC: 11.6 MMOL/L
GLUCOSE SERPL-MCNC: NORMAL MG/DL
HBA1C MFR BLD: 8.9 % OF TOTAL HGB
POTASSIUM SERPL-SCNC: NORMAL MMOL/L
PROT SERPL-MCNC: NORMAL G/DL
SODIUM SERPL-SCNC: NORMAL MMOL/L
TSH SERPL-ACNC: 0.31 MIU/L (ref 0.4–4.5)

## 2025-03-10 DIAGNOSIS — E11.9 DIABETES MELLITUS TYPE II, NON INSULIN DEPENDENT (MULTI): ICD-10-CM

## 2025-03-10 DIAGNOSIS — Z79.4 DIABETES MELLITUS, TYPE II, INSULIN DEPENDENT (MULTI): ICD-10-CM

## 2025-03-10 DIAGNOSIS — E11.9 DIABETES MELLITUS, TYPE II, INSULIN DEPENDENT (MULTI): ICD-10-CM

## 2025-03-10 LAB
ALBUMIN SERPL-MCNC: 4.2 G/DL (ref 3.6–5.1)
ALBUMIN/CREAT UR: 8 MG/G CREAT
ALP SERPL-CCNC: 49 U/L (ref 37–153)
ALT SERPL-CCNC: 11 U/L (ref 6–29)
ANION GAP SERPL CALCULATED.4IONS-SCNC: 9 MMOL/L (CALC) (ref 7–17)
AST SERPL-CCNC: 11 U/L (ref 10–35)
BILIRUB SERPL-MCNC: 0.3 MG/DL (ref 0.2–1.2)
BUN SERPL-MCNC: 24 MG/DL (ref 7–25)
CALCIUM SERPL-MCNC: 9.6 MG/DL (ref 8.6–10.4)
CHLORIDE SERPL-SCNC: 106 MMOL/L (ref 98–110)
CO2 SERPL-SCNC: 26 MMOL/L (ref 20–32)
CREAT SERPL-MCNC: 0.75 MG/DL (ref 0.6–0.95)
CREAT UR-MCNC: 62 MG/DL (ref 20–275)
EGFRCR SERPLBLD CKD-EPI 2021: 80 ML/MIN/1.73M2
EST. AVERAGE GLUCOSE BLD GHB EST-MCNC: 209 MG/DL
EST. AVERAGE GLUCOSE BLD GHB EST-SCNC: 11.6 MMOL/L
GLUCOSE SERPL-MCNC: 191 MG/DL (ref 65–99)
HBA1C MFR BLD: 8.9 % OF TOTAL HGB
MICROALBUMIN UR-MCNC: 0.5 MG/DL
POTASSIUM SERPL-SCNC: 5 MMOL/L (ref 3.5–5.3)
PROT SERPL-MCNC: 6.4 G/DL (ref 6.1–8.1)
SODIUM SERPL-SCNC: 141 MMOL/L (ref 135–146)
TSH SERPL-ACNC: 0.31 MIU/L (ref 0.4–4.5)

## 2025-03-10 RX ORDER — INSULIN DEGLUDEC 100 U/ML
7 INJECTION, SOLUTION SUBCUTANEOUS NIGHTLY
Start: 2025-03-10

## 2025-03-11 ENCOUNTER — HOSPITAL ENCOUNTER (OUTPATIENT)
Dept: RADIOLOGY | Facility: CLINIC | Age: 80
Discharge: HOME | End: 2025-03-11
Payer: MEDICARE

## 2025-03-11 ENCOUNTER — APPOINTMENT (OUTPATIENT)
Dept: ORTHOPEDIC SURGERY | Facility: CLINIC | Age: 80
End: 2025-03-11
Payer: MEDICARE

## 2025-03-11 ENCOUNTER — TELEPHONE (OUTPATIENT)
Dept: PRIMARY CARE | Facility: CLINIC | Age: 80
End: 2025-03-11

## 2025-03-11 ENCOUNTER — OFFICE VISIT (OUTPATIENT)
Dept: ORTHOPEDIC SURGERY | Facility: CLINIC | Age: 80
End: 2025-03-11
Payer: MEDICARE

## 2025-03-11 DIAGNOSIS — G89.29 CHRONIC LEFT SHOULDER PAIN: ICD-10-CM

## 2025-03-11 DIAGNOSIS — M67.912 DISORDER OF LEFT ROTATOR CUFF: ICD-10-CM

## 2025-03-11 DIAGNOSIS — M25.512 LEFT SHOULDER PAIN, UNSPECIFIED CHRONICITY: ICD-10-CM

## 2025-03-11 DIAGNOSIS — M25.512 CHRONIC LEFT SHOULDER PAIN: ICD-10-CM

## 2025-03-11 DIAGNOSIS — M25.512 LEFT SHOULDER PAIN, UNSPECIFIED CHRONICITY: Primary | ICD-10-CM

## 2025-03-11 PROCEDURE — 1123F ACP DISCUSS/DSCN MKR DOCD: CPT | Performed by: STUDENT IN AN ORGANIZED HEALTH CARE EDUCATION/TRAINING PROGRAM

## 2025-03-11 PROCEDURE — 73030 X-RAY EXAM OF SHOULDER: CPT | Mod: LT

## 2025-03-11 PROCEDURE — 99203 OFFICE O/P NEW LOW 30 MIN: CPT | Performed by: STUDENT IN AN ORGANIZED HEALTH CARE EDUCATION/TRAINING PROGRAM

## 2025-03-11 PROCEDURE — 1036F TOBACCO NON-USER: CPT | Performed by: STUDENT IN AN ORGANIZED HEALTH CARE EDUCATION/TRAINING PROGRAM

## 2025-03-11 PROCEDURE — 1159F MED LIST DOCD IN RCRD: CPT | Performed by: STUDENT IN AN ORGANIZED HEALTH CARE EDUCATION/TRAINING PROGRAM

## 2025-03-11 PROCEDURE — 73030 X-RAY EXAM OF SHOULDER: CPT | Mod: LEFT SIDE | Performed by: RADIOLOGY

## 2025-03-11 PROCEDURE — 99213 OFFICE O/P EST LOW 20 MIN: CPT | Performed by: STUDENT IN AN ORGANIZED HEALTH CARE EDUCATION/TRAINING PROGRAM

## 2025-03-11 NOTE — PROGRESS NOTES
CHIEF COMPLAINT:   Chief Complaint   Patient presents with    Left Shoulder - Pain     History: 79 y.o. female presents to the office today for evaluation of her left shoulder.  She lives in a senior community.  She says over the last 8 months she is having significant pain throughout her body.  No acute injury she can remember.  She was diagnosed with polymyalgia as well as diabetes and has been undergoing treatment for this.  She was recently told that now they do not think that she has polymyalgia and she is being weaned from her prednisone.  She states that over the last few months her left shoulder has started hurting again.  Pain is really laterally based but is not had any specific treatments yet.    Past medical history, past surgical history, medications, allergies, family history, social history, and review of systems were reviewed today.    A 12 point review of systems was negative other than as stated in the HPI.    Past Medical History:   Diagnosis Date    Abnormal ECG     Actinic keratosis     Acute candidiasis of vulva and vagina     Yeast infection of the vagina    Acute laryngitis 03/20/2018    Acute laryngitis    Lee's cyst of knee 04/06/2023    Basal cell carcinoma     20+years    Bitten or stung by nonvenomous insect and other nonvenomous arthropods, initial encounter 09/02/2015    Bug bite with infection    Candidiasis, unspecified 10/13/2017    Yeast infection    CHF (congestive heart failure)     Chronic ethmoidal sinusitis 11/24/2020    Chronic ethmoidal sinusitis    Chronic maxillary sinusitis 08/06/2020    Chronic maxillary sinusitis    Diabetes mellitus (Multi)     Disease of thyroid gland     LAYTON (dyspnea on exertion) 09/28/2023    Eczema     GERD (gastroesophageal reflux disease)     Hair loss 04/06/2023    Heart murmur     Heart valve disease     Laceration without foreign body of unspecified forearm, initial encounter 01/09/2022    Forearm laceration    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     Lumbago with sciatica, left side 03/29/2019    Chronic bilateral low back pain with left-sided sciatica    Lymphocytic colitis 05/05/2023    Colonoscopy: 4/24/2023    Nail disorder, unspecified 08/08/2023    Nasal turbinate hypertrophy 04/06/2023    Neoplasm of uncertain behavior of skin 08/08/2023    Obesity 01/19/2015    Onychomycosis 04/06/2023    Open wound of hip and thigh with tendon involvement 05/24/2018    Osteoarthritis 04/06/2023    Osteoarthritis, localized, knee 04/06/2023    Overweight (BMI 25.0-29.9) 04/06/2023    Personal history of diseases of the skin and subcutaneous tissue 01/09/2022    History of contact dermatitis    Personal history of diseases of the skin and subcutaneous tissue 09/02/2015    History of cellulitis    Personal history of other diseases of the musculoskeletal system and connective tissue     Personal history of arthritis    Personal history of other diseases of the nervous system and sense organs     History of cataract    Personal history of other diseases of the nervous system and sense organs 09/20/2019    History of sciatica    Personal history of other endocrine, nutritional and metabolic disease     History of hypothyroidism    Personal history of other infectious and parasitic diseases 09/23/2017    History of erysipelas    Personal history of other specified conditions 03/06/2020    History of epistaxis    Rash and other nonspecific skin eruption 06/15/2015    Skin rash    Rhinitis 10 years    Sciatica 12/11/2014    Status post joint replacement 06/19/2023    Strain of muscle, fascia and tendon of the posterior muscle group at thigh level, unspecified thigh, initial encounter 05/01/2018    Hamstring strain    Tinnitus 10 years    Vaginal discharge 04/06/2023    Varus deformity, not elsewhere classified, unspecified knee 03/06/2019    Acquired genu varum        Allergies   Allergen Reactions    Other Other    Sulfa (Sulfonamide Antibiotics) Unknown    Miconazole Rash     Neomycin-Bacitracin-Polymyxin Rash        Past Surgical History:   Procedure Laterality Date    ANOMALOUS PULMONARY VENOUS RETURN REPAIR, TOTAL N/A 2024    Procedure: TAVR-OR;  Surgeon: Romeo Aranda MD PhD;  Location: [address] Cardiac Cath Lab;  Service: Cardiac Surgery;  Laterality: N/A;    CARDIAC CATHETERIZATION N/A 2024    Procedure: TAVR (Transcatheter AV Replacement);  Surgeon: Oli Lazaro MD;  Location: [address] Cardiac Cath Lab;  Service: Cardiovascular;  Laterality: N/A;   @ 7:30am    CARDIAC CATHETERIZATION N/A 2024    Procedure: TVP for TAVR;  Surgeon: Oli Lazaro MD;  Location: [address] Cardiac Cath Lab;  Service: Cardiovascular;  Laterality: N/A;    CATARACT EXTRACTION  2014    Cataract Surgery    DILATION AND CURETTAGE OF UTERUS      JOINT REPLACEMENT      OTHER SURGICAL HISTORY  2022    Colonoscopy    SKIN BIOPSY      TONSILLECTOMY      TOTAL KNEE ARTHROPLASTY          Family History   Problem Relation Name Age of Onset    Cancer Sister Kaila     Sudden death Daughter          Social History     Socioeconomic History    Marital status:      Spouse name: Not on file    Number of children: Not on file    Years of education: Not on file    Highest education level: Not on file   Occupational History    Not on file   Tobacco Use    Smoking status: Former     Current packs/day: 0.00     Average packs/day: 0.3 packs/day for 5.0 years (1.3 ttl pk-yrs)     Types: Cigarettes     Start date:      Quit date:      Years since quittin.2     Passive exposure: Never    Smokeless tobacco: Never   Vaping Use    Vaping status: Never Used   Substance and Sexual Activity    Alcohol use: Yes     Alcohol/week: 1.0 standard drink of alcohol     Types: 1 Glasses of wine per week     Comment: Once or twice a Year.    Drug use: Never    Sexual activity: Not Currently     Partners: Male   Other Topics Concern    Not on file   Social History  Narrative    Not on file     Social Drivers of Health     Financial Resource Strain: Low Risk  (6/17/2024)    Overall Financial Resource Strain (CARDIA)     Difficulty of Paying Living Expenses: Not hard at all   Food Insecurity: No Food Insecurity (5/13/2024)    Hunger Vital Sign     Worried About Running Out of Food in the Last Year: Never true     Ran Out of Food in the Last Year: Never true   Transportation Needs: No Transportation Needs (6/17/2024)    PRAPARE - Transportation     Lack of Transportation (Medical): No     Lack of Transportation (Non-Medical): No   Physical Activity: Not on file   Stress: Not on file   Social Connections: Not on file   Intimate Partner Violence: Not on file   Housing Stability: Low Risk  (6/17/2024)    Housing Stability Vital Sign     Unable to Pay for Housing in the Last Year: No     Number of Places Lived in the Last Year: 1     Unstable Housing in the Last Year: No        CURRENT MEDICATIONS:   Current Outpatient Medications   Medication Sig Dispense Refill    acetaminophen (Tylenol) 325 mg tablet Take 2 tablets (650 mg) by mouth every 6 hours if needed for moderate pain (4 - 6), mild pain (1 - 3), headaches or fever (temp greater than 38.0 C).      amoxicillin (Amoxil) 500 mg capsule Take 4 caps (2000 mg) 30-60mins prior to your dental appointment 4 capsule 5    aspirin 81 mg EC tablet Take 1 tablet (81 mg) by mouth once daily.      blood sugar diagnostic strip Use as directed to test blood sugar once daily 100 each 11    blood-glucose meter misc Use as directed to test blood sugar once daily 1 each 0    celecoxib (CeleBREX) 200 mg capsule Take 1 capsule (200 mg) by mouth once daily. 90 capsule 1    famotidine (Pepcid) 10 mg tablet Take 1 tablet (10 mg) by mouth once daily as needed for heartburn.      FreeStyle Levon 3 [place] misc Use as instructed 1 each 0    FreeStyle Levon 3 Sensor device Use as directed 5 each 3    hydrocortisone 2.5 % cream Apply topically 2 times a day  "for 14 days. 20 g 3    insulin degludec (Tresiba FlexTouch) 100 unit/mL (3 mL) pen Inject 7 Units under the skin once daily at bedtime. Take as directed per insulin instructions.      lancets 30 gauge misc Use as directed to test blood sugar once daily 100 each 11    lancets misc Use as directed to test blood sugar once daily 100 each 3    levothyroxine (Synthroid, Levoxyl) 125 mcg tablet Take 1 tablet (125 mcg) by mouth once daily. 90 tablet 1    metoprolol succinate XL (Toprol-XL) 25 mg 24 hr tablet Take 1 tablet (25 mg) by mouth once daily. Do not crush or chew. 90 tablet 3    mometasone (Elocon) 0.1 % cream Apply topically once daily. 15 g 0    pen needle, diabetic 31 gauge x 5/16\" needle Use as directed to inject insulin once daily 100 each 11    predniSONE (Deltasone) 1 mg tablet Take 3 tablets (3 mg) by mouth once daily. (Patient taking differently: Take 2 tablets (2 mg) by mouth once daily.) 90 tablet 2    sacubitriL-valsartan (Entresto) 24-26 mg tablet Take 1 tablet by mouth once daily. (Patient taking differently: Take 0.5 tablets by mouth 2 times a day.) 90 tablet 3    traZODone (Desyrel) 50 mg tablet Take 1 tablet (50 mg) by mouth as needed at bedtime.       No current facility-administered medications for this visit.       Physical Examination:      9/5/2024     1:24 PM 10/1/2024     9:12 AM 11/12/2024     1:34 PM 11/14/2024     1:23 PM 1/11/2025     8:09 AM 2/13/2025     1:14 PM 3/7/2025    11:44 AM   Vitals   Systolic 102 120 124 124 124 133 129   Diastolic 69 70 70 77 72 81 65   BP Location   Right arm  Right arm  Left arm   Heart Rate 67   49  55    Temp 36.3 °C (97.3 °F) 35.9 °C (96.6 °F) 35.9 °C (96.6 °F) 36.3 °C (97.3 °F) 36.3 °C (97.4 °F) 36.1 °C (97 °F) 36.7 °C (98 °F)   Height 1.575 m (5' 2\")  1.549 m (5' 1\") 1.575 m (5' 2\") 1.575 m (5' 2\") 1.575 m (5' 2\")    Weight (lb) 154 151 155 153.8 155 154 157.63   BMI 28.17 kg/m2 27.62 kg/m2 29.29 kg/m2 28.13 kg/m2 28.35 kg/m2 28.17 kg/m2 28.83 kg/m2 "   BSA (m2) 1.75 m2 1.73 m2 1.74 m2 1.75 m2 1.75 m2 1.75 m2 1.77 m2   Visit Report Report Report Report Report Report Report Report      There is no height or weight on file to calculate BMI.    Well-appearing, appears stated age, pleasant and cooperative, appropriate mood and behavior. Height and weight reviewed. Alert and oriented x3.  Auditory function intact.  No acute distress.  Intact ocular function, LAN, EOMI. Breathing is unlabored .  There is no evidence of jugular venous distension. Skin appearance is normal without evidence of rash or other lesions. 2+ radial pulses bilaterally, fingers pink and wwp, good capillary refill, no pitting edema. No appreciable lymphadenopathy in bilateral upper extremities. SILT throughout both upper extremities, median/radial/ulnar/musculocutaneous/axillary nerve motor and sensory intact (except for abnormalities noted in focused musculoskeletal exam section below).     Neck exam: Full range of motion of the neck in flexion/extension and rotational movements. No significant areas of tenderness to palpation in the neck.    On exam of bilateral upper extremities, metatarsal fracture still has preserved range of motion of both shoulders, forward flexion to 130, external Tatian of 30, internal Tatian to L5.  Rotator cuff strength is preserved but painful on the left.  Pain with Neer and Lopez maneuvers of the left shoulder.    Imaging: Radiographs of the left shoulder performed today.  Personally interpreted by myself.  Preserved glenohumeral joint space.  Preserved acromiohumeral interval.  No acute fractures noted.       Assessment: Left rotator cuff tendinitis    Plan:  Patient's symptoms, test result and exam are consistent with a diagnosis of rotator cuff tendinitis.  We did discuss the natural history of this.  Conservative management is often the first-line treatment for this, which includes physical therapy, injections and activity modification.  In rare cases of  recalcitrant pain, surgery may be indicated, but this is only after extensive nonoperative care.  Patient understands this.  They wish to proceed with PT.'      Dragon software was used to dictate this note, please be aware that minor errors in transcription may be present.    Anish Taylor MD    Shoulder/Elbow Surgery  [facility]

## 2025-03-11 NOTE — TELEPHONE ENCOUNTER
Kristin Hollingsworth Melvin called in to schedule apt fup for her labs, scheduled fup with Dr. French. MilaEdel Charles was questioning her levothyroxine (Synthroid, Levoxyl) medication, she asked since her thyroid was low were you going to change med dose?

## 2025-03-12 ENCOUNTER — APPOINTMENT (OUTPATIENT)
Dept: PHARMACY | Facility: HOSPITAL | Age: 80
End: 2025-03-12
Payer: MEDICARE

## 2025-03-12 DIAGNOSIS — E11.9 DIABETES MELLITUS TYPE II, NON INSULIN DEPENDENT (MULTI): Primary | ICD-10-CM

## 2025-03-12 DIAGNOSIS — Z79.4 DIABETES MELLITUS, TYPE II, INSULIN DEPENDENT (MULTI): ICD-10-CM

## 2025-03-12 DIAGNOSIS — E11.9 DIABETES MELLITUS, TYPE II, INSULIN DEPENDENT (MULTI): ICD-10-CM

## 2025-03-12 NOTE — PROGRESS NOTES
"[facility] Pharmacy Consult  Kristin Charles \"Edel\" is a 79 y.o. female was referred to Clinical Pharmacy Team for a Pharmacy consult.  The patient was referred for their Diabetes.    Referring Provider: Melanie Muñoz DO    Subjective   Allergies   Allergen Reactions    Other Other    Sulfa (Sulfonamide Antibiotics) Unknown    Miconazole Rash    Neomycin-Bacitracin-Polymyxin Rash       [facility] - [place], [place] - [address]  [address] [facility] [address]  Phone: [phone] Fax: [phone]    [facility]  [address], [address]  [facility] [address]  Phone: [phone] Fax: [phone]    [facility]  [address], [address]  [facility] [address]  Phone: [phone] Fax: [phone]      HPI    Patient is here for a FUV after putting on her sensors 2 weeks ago. Patient reported after taking her sensors off, it tore some of her skin off and left some rash and irritation. Did not put a new sensor on.     Tresiba was increased to 7 units by PCP due to elevated A1c. Reported having some hypoglycemic episodes in the morning. Pt reported glucose this morning was around 138 mg/dL.    AM glucose has been low but high in the evening.     Patient tried Jardiance in the past but was having yeast infections. Pt reported that pt's polymyalgia rheumatica might be a torn rotator cuff instead.     PMH of Pancreatitis: No  PMH of Urinary Tract Infections: Yes  PMH of gastroparesis: No  PMH of gastric bypass surgery: No  PMH of MTC: No  PMH of MEN2: No    Review of Systems    Objective     There were no vitals taken for this visit.     LAB  Lab Results   Component Value Date    BILITOT 0.3 03/07/2025    CALCIUM 9.6 03/07/2025    CO2 26 03/07/2025     03/07/2025    CREATININE 0.75 03/07/2025    GLUCOSE 191 (H) 03/07/2025    ALKPHOS 49 03/07/2025    K 5.0 03/07/2025    PROT 6.4 03/07/2025     03/07/2025    AST 11 03/07/2025    ALT 11 03/07/2025    " BUN 24 03/07/2025    ANIONGAP 9 03/07/2025    MG 2.03 06/18/2024    PHOS 3.3 10/07/2022    ALBUMIN 4.2 03/07/2025    GFRF CANCELED 05/27/2023    GFRMALE CANCELED 05/27/2023     Lab Results   Component Value Date    TRIG 136 11/15/2023    CHOL 171 11/15/2023    LDLCALC 90 11/15/2023    HDL 53.6 11/15/2023     Lab Results   Component Value Date    HGBA1C 8.9 (H) 03/07/2025       Current Outpatient Medications on File Prior to Visit   Medication Sig Dispense Refill    acetaminophen (Tylenol) 325 mg tablet Take 2 tablets (650 mg) by mouth every 6 hours if needed for moderate pain (4 - 6), mild pain (1 - 3), headaches or fever (temp greater than 38.0 C).      amoxicillin (Amoxil) 500 mg capsule Take 4 caps (2000 mg) 30-60mins prior to your dental appointment 4 capsule 5    aspirin 81 mg EC tablet Take 1 tablet (81 mg) by mouth once daily.      blood sugar diagnostic strip Use as directed to test blood sugar once daily 100 each 11    blood-glucose meter misc Use as directed to test blood sugar once daily 1 each 0    celecoxib (CeleBREX) 200 mg capsule Take 1 capsule (200 mg) by mouth once daily. 90 capsule 1    famotidine (Pepcid) 10 mg tablet Take 1 tablet (10 mg) by mouth once daily as needed for heartburn.      FreeStyle Levon 3 [place] misc Use as instructed 1 each 0    FreeStyle Levon 3 Sensor device Use as directed 5 each 3    hydrocortisone 2.5 % cream Apply topically 2 times a day for 14 days. 20 g 3    insulin degludec (Tresiba FlexTouch) 100 unit/mL (3 mL) pen Inject 7 Units under the skin once daily at bedtime. Take as directed per insulin instructions.      lancets 30 gauge misc Use as directed to test blood sugar once daily 100 each 11    lancets misc Use as directed to test blood sugar once daily 100 each 3    levothyroxine (Synthroid, Levoxyl) 125 mcg tablet Take 1 tablet (125 mcg) by mouth once daily. 90 tablet 1    metoprolol succinate XL (Toprol-XL) 25 mg 24 hr tablet Take 1 tablet (25 mg) by mouth once  "daily. Do not crush or chew. 90 tablet 3    mometasone (Elocon) 0.1 % cream Apply topically once daily. 15 g 0    pen needle, diabetic 31 gauge x 5/16\" needle Use as directed to inject insulin once daily 100 each 11    predniSONE (Deltasone) 1 mg tablet Take 3 tablets (3 mg) by mouth once daily. (Patient taking differently: Take 2 tablets (2 mg) by mouth once daily.) 90 tablet 2    sacubitriL-valsartan (Entresto) 24-26 mg tablet Take 1 tablet by mouth once daily. (Patient taking differently: Take 0.5 tablets by mouth 2 times a day.) 90 tablet 3    traZODone (Desyrel) 50 mg tablet Take 1 tablet (50 mg) by mouth as needed at bedtime.      [DISCONTINUED] insulin degludec (Tresiba FlexTouch) 100 unit/mL (3 mL) pen Inject 5 Units under the skin once daily at bedtime. Take as directed per insulin instructions. 15 mL 12     No current facility-administered medications on file prior to visit.        HISTORICAL PHARMACOTHERAPY  -none    DRUG INTERACTIONS  - none    Assessment/Plan   Problem List Items Addressed This Visit       Diabetes mellitus, type II, insulin dependent (Multi)     CONTINUE all meds as prescirbed  Pt to put on her sensor for 1 week then assess glucose trends at next PCP visit on 3/20  Pt is having a lot of fluctuations. AM glucose is showing < 130mg/dL trend but PM is likely showing high number. Alternative: start Humalog with lunch to control evening numbers. Can also start GLP-1 to reduce fluctuations as well  DM uncontrolled, last A1c of 8.9%  FUV in 2 weeks         Relevant Orders    Referral to Clinical Pharmacy     Other Visit Diagnoses       Diabetes mellitus type II, non insulin dependent (Multi)    -  Primary    Relevant Orders    Referral to Clinical Pharmacy           Continue all meds under the continuation of care with the referring provider and clinical pharmacy team.    Luis Pelaez, Milvia     Verbal consent to manage patient's drug therapy was obtained from [the patient and/or an individual " authorized to act on behalf of a patient]. They were informed they may decline to participate or withdraw from participation in pharmacy services at any time.

## 2025-03-13 ENCOUNTER — TELEPHONE (OUTPATIENT)
Facility: CLINIC | Age: 80
End: 2025-03-13
Payer: MEDICARE

## 2025-03-13 NOTE — TELEPHONE ENCOUNTER
----- Message from Mellissa Webster sent at 3/13/2025 10:46 AM EDT -----  Regarding: RE: Prednisone  No need unless she develops new symptoms she can let us know  ----- Message -----  From: Criss Pierson [facility]  Sent: 3/13/2025  10:26 AM EDT  To: Mellissa Webster MD  Subject: Prednisone                                       Patient called asking if you would like to see her before she stops taking the 1mg Prednisone. She said it will be about 3 weeks around 4/17.

## 2025-03-13 NOTE — TELEPHONE ENCOUNTER
----- Message from Mellissa Webster sent at 3/13/2025  9:45 AM EDT -----  Regarding: RE: Prednisone  Yes she can go down to 1 if she has been on the 2 mg for at least 3 weeks  ----- Message -----  From: Criss Pierson CMA  Sent: 3/13/2025   9:40 AM EDT  To: Mellissa Webster MD  Subject: RE: Prednisone                                   Patient states she has been on the 2mg Prednisone and doing fine. She would like to know if she can go down to the 1mg. The Prednisone is causing her glucose numbers to be off and she has started Tresiba for that.  ----- Message -----  From: Mellissa Webster MD  Sent: 3/13/2025   9:26 AM EDT  To: Criss Pierson CMA  Subject: RE: Prednisone                                   If she is feeling well on the 3 mg and her PMR symptoms are gone. She can try to do 2 mg and see how that goes  ----- Message -----  From: Criss Pierson CMA  Sent: 3/12/2025   3:49 PM EDT  To: Mellissa Webster MD  Subject: Prednisone                                       Patient called asking if she could start weaning herself off the prednisone.

## 2025-03-14 ASSESSMENT — ENCOUNTER SYMPTOMS
POLYPHAGIA: 0
WEIGHT LOSS: 0
BLACKOUTS: 0
POLYDIPSIA: 0
DIZZINESS: 0
HEADACHES: 0
SWEATS: 0
VISUAL CHANGE: 0
SPEECH DIFFICULTY: 0
SEIZURES: 0
HUNGER: 0
BLURRED VISION: 0
TREMORS: 0
FATIGUE: 0
NERVOUS/ANXIOUS: 0
CONFUSION: 0

## 2025-03-14 NOTE — ASSESSMENT & PLAN NOTE
CONTINUE all meds as prescirbed  Pt to put on her sensor for 1 week then assess glucose trends at next PCP visit on 3/20  Pt is having a lot of fluctuations. AM glucose is showing < 130mg/dL trend but PM is likely showing high number. Alternative: start Humalog with lunch to control evening numbers. Can also start GLP-1 to reduce fluctuations as well  DM uncontrolled, last A1c of 8.9%  FUV in 2 weeks

## 2025-03-17 DIAGNOSIS — Z79.4 DIABETES MELLITUS, TYPE II, INSULIN DEPENDENT (MULTI): ICD-10-CM

## 2025-03-17 DIAGNOSIS — E11.9 DIABETES MELLITUS TYPE II, NON INSULIN DEPENDENT (MULTI): ICD-10-CM

## 2025-03-17 DIAGNOSIS — E11.9 DIABETES MELLITUS, TYPE II, INSULIN DEPENDENT (MULTI): ICD-10-CM

## 2025-03-17 PROCEDURE — RXMED WILLOW AMBULATORY MEDICATION CHARGE

## 2025-03-17 RX ORDER — LANCETS
EACH MISCELLANEOUS
Qty: 100 EACH | Refills: 3 | Status: SHIPPED | OUTPATIENT
Start: 2025-03-17

## 2025-03-18 ENCOUNTER — PHARMACY VISIT (OUTPATIENT)
Dept: PHARMACY | Facility: CLINIC | Age: 80
End: 2025-03-18
Payer: COMMERCIAL

## 2025-03-20 ENCOUNTER — APPOINTMENT (OUTPATIENT)
Dept: PRIMARY CARE | Facility: CLINIC | Age: 80
End: 2025-03-20
Payer: MEDICARE

## 2025-03-20 VITALS
HEIGHT: 62 IN | HEART RATE: 68 BPM | WEIGHT: 156 LBS | BODY MASS INDEX: 28.71 KG/M2 | SYSTOLIC BLOOD PRESSURE: 133 MMHG | DIASTOLIC BLOOD PRESSURE: 75 MMHG | OXYGEN SATURATION: 98 %

## 2025-03-20 DIAGNOSIS — M35.3 PMR (POLYMYALGIA RHEUMATICA) (MULTI): ICD-10-CM

## 2025-03-20 DIAGNOSIS — E03.9 HYPOTHYROIDISM, UNSPECIFIED TYPE: ICD-10-CM

## 2025-03-20 DIAGNOSIS — M25.512 CHRONIC LEFT SHOULDER PAIN: ICD-10-CM

## 2025-03-20 DIAGNOSIS — E11.9 DIABETES MELLITUS, TYPE II, INSULIN DEPENDENT (MULTI): Primary | ICD-10-CM

## 2025-03-20 DIAGNOSIS — G89.29 CHRONIC LEFT SHOULDER PAIN: ICD-10-CM

## 2025-03-20 DIAGNOSIS — Z79.4 DIABETES MELLITUS, TYPE II, INSULIN DEPENDENT (MULTI): Primary | ICD-10-CM

## 2025-03-20 PROCEDURE — 99213 OFFICE O/P EST LOW 20 MIN: CPT | Performed by: FAMILY MEDICINE

## 2025-03-20 PROCEDURE — 3078F DIAST BP <80 MM HG: CPT | Performed by: FAMILY MEDICINE

## 2025-03-20 PROCEDURE — 1123F ACP DISCUSS/DSCN MKR DOCD: CPT | Performed by: FAMILY MEDICINE

## 2025-03-20 PROCEDURE — G2211 COMPLEX E/M VISIT ADD ON: HCPCS | Performed by: FAMILY MEDICINE

## 2025-03-20 PROCEDURE — 1160F RVW MEDS BY RX/DR IN RCRD: CPT | Performed by: FAMILY MEDICINE

## 2025-03-20 PROCEDURE — 3075F SYST BP GE 130 - 139MM HG: CPT | Performed by: FAMILY MEDICINE

## 2025-03-20 PROCEDURE — 1159F MED LIST DOCD IN RCRD: CPT | Performed by: FAMILY MEDICINE

## 2025-03-20 PROCEDURE — 1036F TOBACCO NON-USER: CPT | Performed by: FAMILY MEDICINE

## 2025-03-20 NOTE — PROGRESS NOTES
"Subjective     Patient ID: [id number]     Kristin Charles \"Crescencio" is a 79 y.o. female who presents for Follow up sugar.    HPI  Telephone message from 3- indicated patient was questioning her Synthroid medication and if the dosage was going to be changed based on recent labs.  TSH was low but reflex free T4 was not ordered.  She denies palpitations, diarrhea, increased anxiety.       Office visit on 3 - 7 - 25 with Dr. Marcus Muñoz for extreme glucose fluctuations.  Lab was ordered.  Plan was to follow-up in 3 months but no clear change in diabetes magic medication was indicated.  Was also referred to endocrinology.  -Lab follow-up indicated poor diabetes control and increase Tresiba from 5 units to 7 units daily.  -Endocrinology appointment has not been scheduled  - having difficulty with Freestyle Levon CGM;  having to take finger stick readings 3x/day.  Feels that the CGM is not accurate.   - has had conversation with Clinical Pharmacist, Dr. Luis Pelaez on 3/17/25 and a follow up next week.    - did not tolerate Jardiance.  Severe vaginal yeast infection.  - home blood sugar log 110s - 120s in AM and PM.  Follows a diabetic diet and exercises regularly.      Polymyalgia - Tx by Rheum and Ortho. Possible torn left rotator cuff.      Heart valve replacement in 8/2024          Objective   /75   Pulse 68   Ht 1.575 m (5' 2\")   Wt 70.8 kg (156 lb)   SpO2 98%   BMI 28.53 kg/m²    Physical Exam:   Alert and oriented x 3.  Freestyle levon 3 sensor removed without skin tear.  No signs of infection.  Bandage applied.  Glucometer shutdown.  Has difficulty getting dressed and needs assistance to put her coat on due to left shoulder pain and reduced range of motion    Assessment/Plan   1. Diabetes mellitus, type II, insulin dependent (Multi) (Primary)  -Continue Tresiba 7 units daily.  - Continue fingerstick glucometer readings twice daily  - Meeting with clinical pharmacist next week.  Considering Ozempic or " Mounjaro instead of insulin or in addition to insulin.  - Discontinue continuous glucose monitor due to inability to obtain accurate readings and irritation with multiple CGM warnings.    2. Hypothyroidism, unspecified type  -Continue current dose of Synthroid.  Consider future lab to include free T4    3. PMR (polymyalgia rheumatica) (Multi)  -Continue management with rheumatologist.  Patient currently has minimal symptoms    4. Chronic left shoulder pain  -Continue evaluation by orthopedic surgeon        Follow up in office with family physician, Dr. Melanie Muñoz, in 3 months    I will continue to monitor, evaluate, assess and treat all problems/diagnoses as appropriate and continue to collaborate with specialists.    Contact office or send a  MY Chart message with any questions or concerns    Encouraged to sign up with my  My Chart  Patient will only be notified of labs that require medical intervention.    Prescriptions will not be filled unless you are compliant with your follow up appointments or have a follow up appointment scheduled as per instruction of your physician. Refills should be requested at the time of your visit.    **Charting was completed using voice recognition technology and may include unintended errors**    Marcus French DO, FACOFP  [address]  [place] [address] [phone]    Problem List Items Addressed This Visit    None      Marcus French DO

## 2025-03-26 ENCOUNTER — APPOINTMENT (OUTPATIENT)
Dept: PHARMACY | Facility: HOSPITAL | Age: 80
End: 2025-03-26
Payer: MEDICARE

## 2025-03-26 DIAGNOSIS — E11.9 DIABETES MELLITUS, TYPE II, INSULIN DEPENDENT (MULTI): ICD-10-CM

## 2025-03-26 DIAGNOSIS — E11.9 DIABETES MELLITUS TYPE II, NON INSULIN DEPENDENT (MULTI): Primary | ICD-10-CM

## 2025-03-26 DIAGNOSIS — Z79.4 DIABETES MELLITUS, TYPE II, INSULIN DEPENDENT (MULTI): ICD-10-CM

## 2025-03-26 NOTE — ASSESSMENT & PLAN NOTE
CONTINUE all meds as prescribed  Educate on side hypoglycemia  Pt to take another glucose level at bedtime  FUV in 4 weeks to discuss possible starting GLP-1 and A1c

## 2025-03-26 NOTE — PROGRESS NOTES
"[facility] Pharmacy Consult  Kristin Charles \"Edel\" is a 79 y.o. female was referred to Clinical Pharmacy Team for a Pharmacy consult.  The patient was referred for their Diabetes.    Referring Provider: Melanie Muñoz DO    Subjective   Allergies   Allergen Reactions    Other Other    Sulfa (Sulfonamide Antibiotics) Unknown    Miconazole Rash    Neomycin-Bacitracin-Polymyxin Rash       [facility] - [place], [place] - [address] [facility]  [address] [facility] [address]  Phone: [phone] Fax: [phone]    [facility]  [address], [address]  [facility] [address]  Phone: [phone] Fax: [phone]    [facility]  [address]  [facility] [address]  Phone: [phone] Fax: [phone]      HPI  Diabetes  She presents for her follow-up diabetic visit. She has type 2 diabetes mellitus. Her disease course has been stable. There are no hypoglycemic associated symptoms. There are no diabetic associated symptoms. There are no hypoglycemic complications. Symptoms are stable. There are no diabetic complications.     Pt has discontinued CGM due to inaccurate readings. Has switched to fingerstick poking now.     PM glucose: Highest is 126 mg/dl. Ranges from 110-130 mg/dL  AM glucose: Ranges from 100-120 mg/dL    Current Lantus: 7 units nightly    Reported Numbers are spiking after a meal. Glucose up to 250 mg/dL. Pt wants to continue on insulin for 1 more months and going on vacation for 1 month. Willing to try GLP-1 after vacation.   Review of Systems    Objective     There were no vitals taken for this visit.     LAB  Lab Results   Component Value Date    BILITOT 0.3 03/07/2025    CALCIUM 9.6 03/07/2025    CO2 26 03/07/2025     03/07/2025    CREATININE 0.75 03/07/2025    GLUCOSE 191 (H) 03/07/2025    ALKPHOS 49 03/07/2025    K 5.0 03/07/2025    PROT 6.4 03/07/2025     03/07/2025    AST 11 03/07/2025    ALT 11 03/07/2025    BUN 24 03/07/2025 "    ANIONGAP 9 03/07/2025    MG 2.03 06/18/2024    PHOS 3.3 10/07/2022    ALBUMIN 4.2 03/07/2025    GFRF CANCELED 05/27/2023    GFRMALE CANCELED 05/27/2023     Lab Results   Component Value Date    TRIG 136 11/15/2023    CHOL 171 11/15/2023    LDLCALC 90 11/15/2023    HDL 53.6 11/15/2023     Lab Results   Component Value Date    HGBA1C 8.9 (H) 03/07/2025       Current Outpatient Medications on File Prior to Visit   Medication Sig Dispense Refill    acetaminophen (Tylenol) 325 mg tablet Take 2 tablets (650 mg) by mouth every 6 hours if needed for moderate pain (4 - 6), mild pain (1 - 3), headaches or fever (temp greater than 38.0 C).      amoxicillin (Amoxil) 500 mg capsule Take 4 caps (2000 mg) 30-60mins prior to your dental appointment (Patient not taking: Reported on 3/20/2025) 4 capsule 5    aspirin 81 mg EC tablet Take 1 tablet (81 mg) by mouth once daily.      blood sugar diagnostic strip Use as directed to test blood sugar three times a day 100 each 11    blood-glucose meter misc Use as directed to test blood sugar once daily 1 each 0    celecoxib (CeleBREX) 200 mg capsule Take 1 capsule (200 mg) by mouth once daily. 90 capsule 1    famotidine (Pepcid) 10 mg tablet Take 1 tablet (10 mg) by mouth once daily as needed for heartburn.      FreeStyle Levon 3 [place] misc Use as instructed 1 each 0    FreeStyle Levon 3 Sensor device Use as directed 5 each 3    hydrocortisone 2.5 % cream Apply topically 2 times a day for 14 days. 20 g 3    insulin degludec (Tresiba FlexTouch) 100 unit/mL (3 mL) pen Inject 7 Units under the skin once daily at bedtime. Take as directed per insulin instructions.      lancets misc Use as directed to test blood sugar three times a day 100 each 3    levothyroxine (Synthroid, Levoxyl) 125 mcg tablet Take 1 tablet (125 mcg) by mouth once daily. 90 tablet 1    metoprolol succinate XL (Toprol-XL) 25 mg 24 hr tablet Take 1 tablet (25 mg) by mouth once daily. Do not crush or chew. 90 tablet 3     "mometasone (Elocon) 0.1 % cream Apply topically once daily. 15 g 0    pen needle, diabetic 31 gauge x 5/16\" needle Use as directed to inject insulin once daily 100 each 11    predniSONE (Deltasone) 1 mg tablet Take 3 tablets (3 mg) by mouth once daily. 90 tablet 2    sacubitriL-valsartan (Entresto) 24-26 mg tablet Take 1 tablet by mouth once daily. 90 tablet 3    traZODone (Desyrel) 50 mg tablet Take 1 tablet (50 mg) by mouth as needed at bedtime.       No current facility-administered medications on file prior to visit.        HISTORICAL PHARMACOTHERAPY  -none    DRUG INTERACTIONS  - none    Assessment/Plan   Problem List Items Addressed This Visit       Diabetes mellitus, type II, insulin dependent (Multi)     CONTINUE all meds as prescribed  Educate on side hypoglycemia  Pt to take another glucose level at bedtime  FUV in 4 weeks to discuss possible starting GLP-1 and A1c          Other Visit Diagnoses       Diabetes mellitus type II, non insulin dependent (Multi)    -  Primary             Continue all meds under the continuation of care with the referring provider and clinical pharmacy team.    Luis Pelaez PharmD     Verbal consent to manage patient's drug therapy was obtained from [the patient and/or an individual authorized to act on behalf of a patient]. They were informed they may decline to participate or withdraw from participation in pharmacy services at any time.  "

## 2025-04-01 DIAGNOSIS — I50.22 HEART FAILURE WITH MID-RANGE EJECTION FRACTION: ICD-10-CM

## 2025-04-01 PROCEDURE — RXMED WILLOW AMBULATORY MEDICATION CHARGE

## 2025-04-03 ENCOUNTER — PHARMACY VISIT (OUTPATIENT)
Dept: PHARMACY | Facility: CLINIC | Age: 80
End: 2025-04-03
Payer: COMMERCIAL

## 2025-04-15 DIAGNOSIS — I50.22 HEART FAILURE WITH MID-RANGE EJECTION FRACTION: ICD-10-CM

## 2025-04-21 ENCOUNTER — TELEPHONE (OUTPATIENT)
Dept: PHARMACY | Facility: HOSPITAL | Age: 80
End: 2025-04-21
Payer: MEDICARE

## 2025-04-21 NOTE — TELEPHONE ENCOUNTER
Patient Assistance Program Approval:     We are pleased to inform you that your application for assistance has been approved.     This approval is valid through  4/21/26  as long as the following criteria continue to be satisfied:     Your medication ( Entresto ) remains covered under your current insurance plan.   Your prescriber does not discontinue therapy.   You do not seek reimbursement from any other private or government-funded programs for the  medication.    Under this program, the pharmacy will first bill your insurance plan for your indemnified specified medication. The [employer] will then offset your copay balance, so that your out-of pocket expense for your specialty medication will be $0.00.    Juno Klein, PharmD

## 2025-04-23 ENCOUNTER — APPOINTMENT (OUTPATIENT)
Dept: PHARMACY | Facility: HOSPITAL | Age: 80
End: 2025-04-23
Payer: MEDICARE

## 2025-04-23 DIAGNOSIS — Z79.4 DIABETES MELLITUS, TYPE II, INSULIN DEPENDENT (MULTI): ICD-10-CM

## 2025-04-23 DIAGNOSIS — E11.9 DIABETES MELLITUS, TYPE II, INSULIN DEPENDENT (MULTI): ICD-10-CM

## 2025-04-23 DIAGNOSIS — E11.9 DIABETES MELLITUS TYPE II, NON INSULIN DEPENDENT (MULTI): Primary | ICD-10-CM

## 2025-04-23 RX ORDER — TIRZEPATIDE 2.5 MG/.5ML
2.5 INJECTION, SOLUTION SUBCUTANEOUS WEEKLY
Qty: 2 ML | Refills: 2 | Status: SHIPPED | OUTPATIENT
Start: 2025-04-23

## 2025-04-23 NOTE — PROGRESS NOTES
"[facility] Pharmacy Consult  Kristin Charles \"Edel\" is a 79 y.o. female was referred to Clinical Pharmacy Team for a Pharmacy consult.  The patient was referred for their Diabetes.    Referring Provider: Melanie Muñoz DO    Subjective   Allergies[1]    [facility] - [place], [place] - [address]  [address] [facility] [address]  Phone: [phone] Fax: [phone]    [facility]  [address], [address]  [facility] [address]  Phone: [phone] Fax: [phone]    [facility]  [address], [address]  [facility] [address]  Phone: [phone] Fax: [phone]      HPI  Diabetes  She presents for her follow-up diabetic visit. She has type 2 diabetes mellitus. Her disease course has been stable. There are no hypoglycemic associated symptoms. There are no diabetic associated symptoms. There are no hypoglycemic complications. Symptoms are stable. There are no diabetic complications.     AM glucose: Ranges from 120 mg/dL    Current Lantus: 7 units nightly     Pt reported still spiking > 250 after a meal and in the evening, Then it drops back down to 140 mg/dL for around bed time.     PMH of Pancreatitis: No  PMH of Urinary Tract Infections: Yes  PMH of gastroparesis: No  PMH of gastric bypass surgery: No  PMH of MTC: No  PMH of MEN2: No    Review of Systems    Objective     There were no vitals taken for this visit.     LAB  Lab Results   Component Value Date    BILITOT 0.3 03/07/2025    CALCIUM 9.6 03/07/2025    CO2 26 03/07/2025     03/07/2025    CREATININE 0.75 03/07/2025    GLUCOSE 191 (H) 03/07/2025    ALKPHOS 49 03/07/2025    K 5.0 03/07/2025    PROT 6.4 03/07/2025     03/07/2025    AST 11 03/07/2025    ALT 11 03/07/2025    BUN 24 03/07/2025    ANIONGAP 9 03/07/2025    MG 2.03 06/18/2024    PHOS 3.3 10/07/2022    ALBUMIN 4.2 03/07/2025    GFRF CANCELED 05/27/2023    GFRMALE CANCELED 05/27/2023     Lab Results   Component Value Date    TRIG " 136 11/15/2023    CHOL 171 11/15/2023    LDLCALC 90 11/15/2023    HDL 53.6 11/15/2023     Lab Results   Component Value Date    HGBA1C 8.9 (H) 03/07/2025       Medications Ordered Prior to Encounter[2]     HISTORICAL PHARMACOTHERAPY  -none    DRUG INTERACTIONS  - none    Assessment/Plan   Problem List Items Addressed This Visit       Diabetes mellitus, type II, insulin dependent (Multi)    START Mounjaro 2.5mg subcutaneous once weekly  Prescription for the following were sent to patient's preferred  Pharmacy:  Mounjaro 2.5mg subcutaneous once weekly  STOP Lantus  Educated and discuss with patient the benefits and risk of starting GLP-1 for weight loss/DM  Educated patient that to maximize weight loss and prevent return weight gain, patient has to combine GLP-1 with good diet and exercise  DASH Diet  150 mins per week of exercise  Educated patient on the side effects of GLP-1: N/V, stomach upset, diarrhea, stomach pain, etc. Patient instructed to call should there be any questions or concerns.  Discussed in depth benefits of  Patient Assistance Program ( PAP) to help reduce the cost of selected patient's medication to $0 COPAY. Informed patient that the process may take 2-3 weeks for an outcome. Patient is aware they are responsible for any copay amount, should they decide to fill the medication before approval.  Will send [facility] 340b Eligible prescription to the patient's Preferred  Pharmacy for  PAP:  Mounjaro  FUV with pharmacy in 4 weeks to assess tolerance and titration         Relevant Medications    tirzepatide (Mounjaro) 2.5 mg/0.5 mL pen injector    Other Relevant Orders    Referral to Clinical Pharmacy     Other Visit Diagnoses         Diabetes mellitus type II, non insulin dependent (Multi)    -  Primary    Relevant Medications    tirzepatide (Mounjaro) 2.5 mg/0.5 mL pen injector    Other Relevant Orders    Referral to Clinical Pharmacy             Continue all meds under the continuation of care  with the referring provider and clinical pharmacy team.    Luis Pelaez PharmD     Verbal consent to manage patient's drug therapy was obtained from [the patient and/or an individual authorized to act on behalf of a patient]. They were informed they may decline to participate or withdraw from participation in pharmacy services at any time.         [1]   Allergies  Allergen Reactions    Other Other    Sulfa (Sulfonamide Antibiotics) Unknown    Miconazole Rash    Neomycin-Bacitracin-Polymyxin Rash   [2]   Current Outpatient Medications on File Prior to Visit   Medication Sig Dispense Refill    acetaminophen (Tylenol) 325 mg tablet Take 2 tablets (650 mg) by mouth every 6 hours if needed for moderate pain (4 - 6), mild pain (1 - 3), headaches or fever (temp greater than 38.0 C).      amoxicillin (Amoxil) 500 mg capsule Take 4 caps (2000 mg) 30-60mins prior to your dental appointment (Patient not taking: Reported on 3/20/2025) 4 capsule 5    aspirin 81 mg EC tablet Take 1 tablet (81 mg) by mouth once daily.      blood sugar diagnostic strip Use as directed to test blood sugar three times a day 100 each 11    blood-glucose meter misc Use as directed to test blood sugar once daily 1 each 0    celecoxib (CeleBREX) 200 mg capsule Take 1 capsule (200 mg) by mouth once daily. 90 capsule 1    famotidine (Pepcid) 10 mg tablet Take 1 tablet (10 mg) by mouth once daily as needed for heartburn.      FreeStyle Levon 3 [place] misc Use as instructed 1 each 0    hydrocortisone 2.5 % cream Apply topically 2 times a day for 14 days. 20 g 3    insulin degludec (Tresiba FlexTouch) 100 unit/mL (3 mL) pen Inject 7 Units under the skin once daily at bedtime. Take as directed per insulin instructions.      lancets misc Use as directed to test blood sugar three times a day 100 each 3    levothyroxine (Synthroid, Levoxyl) 125 mcg tablet Take 1 tablet (125 mcg) by mouth once daily. 90 tablet 1    metoprolol succinate XL (Toprol-XL) 25 mg 24 hr  "tablet Take 1 tablet (25 mg) by mouth once daily. Do not crush or chew. 90 tablet 3    mometasone (Elocon) 0.1 % cream Apply topically once daily. 15 g 0    pen needle, diabetic 31 gauge x 5/16\" needle Use as directed to inject insulin once daily 100 each 11    predniSONE (Deltasone) 1 mg tablet Take 3 tablets (3 mg) by mouth once daily. 90 tablet 2    sacubitriL-valsartan (Entresto) 24-26 mg tablet Take 0.5 tablets by mouth 2 times a day. 90 tablet 3    traZODone (Desyrel) 50 mg tablet Take 1 tablet (50 mg) by mouth as needed at bedtime.      [DISCONTINUED] FreeStyle Levon 3 Sensor device Use as directed 5 each 3     No current facility-administered medications on file prior to visit.     "

## 2025-04-23 NOTE — ASSESSMENT & PLAN NOTE
START Mounjaro 2.5mg subcutaneous once weekly  Prescription for the following were sent to patient's preferred  Pharmacy:  Mounjaro 2.5mg subcutaneous once weekly  STOP Lantus  Educated and discuss with patient the benefits and risk of starting GLP-1 for weight loss/DM  Educated patient that to maximize weight loss and prevent return weight gain, patient has to combine GLP-1 with good diet and exercise  DASH Diet  150 mins per week of exercise  Educated patient on the side effects of GLP-1: N/V, stomach upset, diarrhea, stomach pain, etc. Patient instructed to call should there be any questions or concerns.  Discussed in depth benefits of  Patient Assistance Program ( PAP) to help reduce the cost of selected patient's medication to $0 COPAY. Informed patient that the process may take 2-3 weeks for an outcome. Patient is aware they are responsible for any copay amount, should they decide to fill the medication before approval.  Will send [facility] 340b Eligible prescription to the patient's Preferred  Pharmacy for  PAP:  Mounjaro  FUV with pharmacy in 4 weeks to assess tolerance and titration

## 2025-04-24 PROCEDURE — RXMED WILLOW AMBULATORY MEDICATION CHARGE

## 2025-04-28 ENCOUNTER — PHARMACY VISIT (OUTPATIENT)
Dept: PHARMACY | Facility: CLINIC | Age: 80
End: 2025-04-28
Payer: COMMERCIAL

## 2025-04-30 ENCOUNTER — APPOINTMENT (OUTPATIENT)
Dept: PHARMACY | Facility: HOSPITAL | Age: 80
End: 2025-04-30
Payer: MEDICARE

## 2025-04-30 DIAGNOSIS — Z79.4 DIABETES MELLITUS, TYPE II, INSULIN DEPENDENT (MULTI): ICD-10-CM

## 2025-04-30 DIAGNOSIS — E11.9 DIABETES MELLITUS, TYPE II, INSULIN DEPENDENT (MULTI): ICD-10-CM

## 2025-04-30 DIAGNOSIS — E11.9 DIABETES MELLITUS TYPE II, NON INSULIN DEPENDENT (MULTI): Primary | ICD-10-CM

## 2025-05-01 ASSESSMENT — ENCOUNTER SYMPTOMS
DIZZINESS: 0
CONFUSION: 0
FATIGUE: 0
NERVOUS/ANXIOUS: 0
VISUAL CHANGE: 0
WEAKNESS: 0
BLACKOUTS: 0
POLYPHAGIA: 0
HEADACHES: 0
WEIGHT LOSS: 0
BLURRED VISION: 0
SEIZURES: 0
SPEECH DIFFICULTY: 0
HUNGER: 0
TREMORS: 0
POLYDIPSIA: 0
SWEATS: 0

## 2025-05-03 LAB
ALBUMIN SERPL-MCNC: 3.9 G/DL (ref 3.6–5.1)
ALP SERPL-CCNC: 51 U/L (ref 37–153)
ALT SERPL-CCNC: 12 U/L (ref 6–29)
ANION GAP SERPL CALCULATED.4IONS-SCNC: 8 MMOL/L (CALC) (ref 7–17)
AST SERPL-CCNC: 11 U/L (ref 10–35)
BILIRUB SERPL-MCNC: 0.5 MG/DL (ref 0.2–1.2)
BUN SERPL-MCNC: 17 MG/DL (ref 7–25)
CALCIUM SERPL-MCNC: 9.4 MG/DL (ref 8.6–10.4)
CHLORIDE SERPL-SCNC: 104 MMOL/L (ref 98–110)
CHOLEST SERPL-MCNC: 157 MG/DL
CHOLEST/HDLC SERPL: 2.7 (CALC)
CO2 SERPL-SCNC: 29 MMOL/L (ref 20–32)
CREAT SERPL-MCNC: 0.81 MG/DL (ref 0.6–0.95)
EGFRCR SERPLBLD CKD-EPI 2021: 73 ML/MIN/1.73M2
ERYTHROCYTE [DISTWIDTH] IN BLOOD BY AUTOMATED COUNT: 12.5 % (ref 11–15)
EST. AVERAGE GLUCOSE BLD GHB EST-MCNC: 194 MG/DL
EST. AVERAGE GLUCOSE BLD GHB EST-SCNC: 10.8 MMOL/L
GLUCOSE SERPL-MCNC: 152 MG/DL (ref 65–99)
HBA1C MFR BLD: 8.4 %
HCT VFR BLD AUTO: 44.2 % (ref 35–45)
HDLC SERPL-MCNC: 59 MG/DL
HGB BLD-MCNC: 14.5 G/DL (ref 11.7–15.5)
LDLC SERPL CALC-MCNC: 83 MG/DL (CALC)
MCH RBC QN AUTO: 31.3 PG (ref 27–33)
MCHC RBC AUTO-ENTMCNC: 32.8 G/DL (ref 32–36)
MCV RBC AUTO: 95.5 FL (ref 80–100)
NONHDLC SERPL-MCNC: 98 MG/DL (CALC)
PLATELET # BLD AUTO: 166 THOUSAND/UL (ref 140–400)
PMV BLD REES-ECKER: 11.8 FL (ref 7.5–12.5)
POTASSIUM SERPL-SCNC: 4.4 MMOL/L (ref 3.5–5.3)
PROT SERPL-MCNC: 6.1 G/DL (ref 6.1–8.1)
RBC # BLD AUTO: 4.63 MILLION/UL (ref 3.8–5.1)
SODIUM SERPL-SCNC: 141 MMOL/L (ref 135–146)
T4 FREE SERPL-MCNC: 1.7 NG/DL (ref 0.8–1.8)
TRIGL SERPL-MCNC: 73 MG/DL
TSH SERPL-ACNC: 0.35 MIU/L (ref 0.4–4.5)
WBC # BLD AUTO: 5.2 THOUSAND/UL (ref 3.8–10.8)

## 2025-05-08 ENCOUNTER — APPOINTMENT (OUTPATIENT)
Dept: PRIMARY CARE | Facility: CLINIC | Age: 80
End: 2025-05-08
Payer: MEDICARE

## 2025-05-08 VITALS
DIASTOLIC BLOOD PRESSURE: 72 MMHG | BODY MASS INDEX: 29.08 KG/M2 | TEMPERATURE: 97.4 F | SYSTOLIC BLOOD PRESSURE: 126 MMHG | WEIGHT: 159 LBS

## 2025-05-08 DIAGNOSIS — E11.9 DIABETES MELLITUS, TYPE II, INSULIN DEPENDENT (MULTI): Primary | ICD-10-CM

## 2025-05-08 DIAGNOSIS — Z79.4 DIABETES MELLITUS, TYPE II, INSULIN DEPENDENT (MULTI): Primary | ICD-10-CM

## 2025-05-08 DIAGNOSIS — E03.9 HYPOTHYROIDISM, UNSPECIFIED TYPE: ICD-10-CM

## 2025-05-08 PROCEDURE — 99214 OFFICE O/P EST MOD 30 MIN: CPT | Performed by: FAMILY MEDICINE

## 2025-05-08 PROCEDURE — 3074F SYST BP LT 130 MM HG: CPT | Performed by: FAMILY MEDICINE

## 2025-05-08 PROCEDURE — 3078F DIAST BP <80 MM HG: CPT | Performed by: FAMILY MEDICINE

## 2025-05-08 PROCEDURE — 1160F RVW MEDS BY RX/DR IN RCRD: CPT | Performed by: FAMILY MEDICINE

## 2025-05-08 PROCEDURE — 1159F MED LIST DOCD IN RCRD: CPT | Performed by: FAMILY MEDICINE

## 2025-05-08 PROCEDURE — 1036F TOBACCO NON-USER: CPT | Performed by: FAMILY MEDICINE

## 2025-05-08 PROCEDURE — G2211 COMPLEX E/M VISIT ADD ON: HCPCS | Performed by: FAMILY MEDICINE

## 2025-05-08 RX ORDER — LEVOTHYROXINE SODIUM 112 UG/1
112 TABLET ORAL DAILY
Qty: 90 TABLET | Refills: 1 | Status: SHIPPED | OUTPATIENT
Start: 2025-05-08 | End: 2025-11-04

## 2025-05-08 NOTE — PROGRESS NOTES
Chief complaint:   Chief Complaint   Patient presents with    4 month follow up       HPI:  Kristin Charles is a 79 y.o. female who presents for evaluation of her diabetes.     Her Lantus was discontinued (still taking until starting Mounjaro next week). Mounjaro 2.5 mg injection once weekly was initiated 4/23/2025 but she will plan to start next week    She is down to 1 mg of Prednisone  She has been referred to Ortho with concern for torn rotator cuff. She completed 5 weeks of PT. She may be getting a cortisone injection but is trying to avoid this. The heated swimming pool 5 days weekly is helping her significantly.     She is struggling with sleeping at night, accidentally falls asleep watching TV during the days sometimes    Physical exam:  /72 (BP Location: Right arm, Patient Position: Sitting)   Temp 36.3 °C (97.4 °F)   Wt 72.1 kg (159 lb)   BMI 29.08 kg/m²   General: NAD, well appearing female  Heart: RRR, no mumur appreciated  Lungs: CTAB, no wheezes, rales, rhonchi  Abdomen: soft, non tender, normoactive BS, no organomegaly  Extremities: No LE edema    Assessment/Plan   Problem List Items Addressed This Visit       Hypothyroidism    Relevant Medications    levothyroxine (Synthroid, Levoxyl) 112 mcg tablet    Other Relevant Orders    Tsh With Reflex To Free T4 If Abnormal    Diabetes mellitus, type II, insulin dependent (Multi) - Primary    Relevant Orders    Referral to Clinical Pharmacy     Decrease dose of Levothyroxine from 125 to 112 mcg of her levothyroxine as slightly out of range and she is complaining of insomnia. Recheck labs in 6-8 weeks  For her diabetes, medication change to happen next week, has appointment with clincial pharmacist  Follow up with me 3 mo, sooner as needed    Melanie Muñoz, DO        Answers submitted by the patient for this visit:  Diabetes Questionnaire (Submitted on 5/1/2025)  Chief Complaint: Diabetes problem  blurred vision: No  chest pain: No  fatigue:  No  foot paresthesias: No  foot ulcerations: No  polydipsia: No  polyphagia: No  polyuria: No  visual change: No  weakness: No  weight loss: No  Symptom course: stable  confusion: No  speech difficulty: No  dizziness: No  nervous/anxious: No  headaches: No  hunger: No  mood changes: No  pallor: No  seizures: No  tremors: No  sleepiness: No  sweats: No  blackouts: No  nocturnal hypoglycemia: Yes  required assistance: No  required glucagon: No  CVA: No  heart disease: Yes  impotence: No  nephropathy: No  peripheral neuropathy: No  PVD: No  retinopathy: No  Current treatments: insulin injections  Dose schedule: pre-breakfast  Given by: patient  Injection sites: abdominal wall  Home blood tests: 1-2 x per day  Monitoring compliance: excellent  Blood glucose trend: fluctuating minimally  breakfast time: 6-7 am  breakfast glucose level: 110-130  lunch time: 12-1 pm  dinner time: 5-6 pm  Bedtime: 10-11 pm  Overall: 110-130  Weight trend: stable  Current diet: generally healthy  Meal planning: ADA exchanges  Exercise: daily  Dietitian visit: No  Eye exam current: Yes  Sees podiatrist: Yes

## 2025-05-14 ENCOUNTER — APPOINTMENT (OUTPATIENT)
Dept: PHARMACY | Facility: HOSPITAL | Age: 80
End: 2025-05-14
Payer: MEDICARE

## 2025-05-14 DIAGNOSIS — E11.9 DIABETES MELLITUS, TYPE II, INSULIN DEPENDENT (MULTI): ICD-10-CM

## 2025-05-14 DIAGNOSIS — Z79.4 DIABETES MELLITUS, TYPE II, INSULIN DEPENDENT (MULTI): ICD-10-CM

## 2025-05-14 DIAGNOSIS — E11.9 DIABETES MELLITUS TYPE II, NON INSULIN DEPENDENT (MULTI): Primary | ICD-10-CM

## 2025-05-14 NOTE — ASSESSMENT & PLAN NOTE
CONTINUE all meds as prescribed  CONTINUE Mounjaro as prescribed  Pt demonstrated appropriate technique with using Mounjaro pen. Pt injected herself with supervision of pharmacist  STOP Tresiba  Pt to test her blood glucose prn  Educate on side effects of GLP-1  FUV in 4 weeks to assess side effects and titration

## 2025-05-14 NOTE — PROGRESS NOTES
"[facility] Pharmacy Consult  Kristin Charles \"Edel\" is a 79 y.o. female was referred to Clinical Pharmacy Team for a Pharmacy consult.  The patient was referred for their Diabetes.    Referring Provider: Melanie Muñoz DO    Subjective   Allergies[1]    [facility] - [place], [place] - [address]  [address] [facility] [address]  Phone: [phone] Fax: [phone]    [facility]  [address], [address]  [facility] [address]  Phone: [phone] Fax: [phone]    [facility]  [address], [address]  [facility] [address]  Phone: [phone] Fax: [phone]      HPI  Diabetes  She presents for her follow-up diabetic visit. She has type 2 diabetes mellitus. Her disease course has been stable. There are no hypoglycemic associated symptoms. There are no diabetic associated symptoms. There are no hypoglycemic complications. Symptoms are stable. There are no diabetic complications.     Patient is here for a demonstration on how to use he new Mounjaro Pen. Pt brought in her Mounjaro 2.5mg subcutaneous pen.    Reported: Glucose yesterday morning 128mg/dL    Prednisone stopped about 4 days ago  Review of Systems    Objective     There were no vitals taken for this visit.     LAB  Lab Results   Component Value Date    BILITOT 0.5 05/02/2025    CALCIUM 9.4 05/02/2025    CO2 29 05/02/2025     05/02/2025    CREATININE 0.81 05/02/2025    GLUCOSE 152 (H) 05/02/2025    ALKPHOS 51 05/02/2025    K 4.4 05/02/2025    PROT 6.1 05/02/2025     05/02/2025    AST 11 05/02/2025    ALT 12 05/02/2025    BUN 17 05/02/2025    ANIONGAP 8 05/02/2025    MG 2.03 06/18/2024    PHOS 3.3 10/07/2022    ALBUMIN 3.9 05/02/2025    GFRF CANCELED 05/27/2023    GFRMALE CANCELED 05/27/2023     Lab Results   Component Value Date    TRIG 73 05/02/2025    CHOL 157 05/02/2025    LDLCALC 83 05/02/2025    HDL 59 05/02/2025     Lab Results   Component Value Date    HGBA1C 8.4 (H) " 05/02/2025       Medications Ordered Prior to Encounter[2]     HISTORICAL PHARMACOTHERAPY  -none    DRUG INTERACTIONS  - none      Assessment/Plan   Problem List Items Addressed This Visit       Diabetes mellitus, type II, insulin dependent (Multi)    CONTINUE all meds as prescribed  CONTINUE Mounjaro as prescribed  Pt demonstrated appropriate technique with using Mounjaro pen. Pt injected herself with supervision of pharmacist  STOP Tresiba  Pt to test her blood glucose prn  Educate on side effects of GLP-1  FUV in 4 weeks to assess side effects and titration         Relevant Orders    Referral to Clinical Pharmacy     Other Visit Diagnoses         Diabetes mellitus type II, non insulin dependent (Multi)    -  Primary    Relevant Orders    Referral to Clinical Pharmacy             Continue all meds under the continuation of care with the referring provider and clinical pharmacy team.    Luis Pelaez PharmD     Verbal consent to manage patient's drug therapy was obtained from [the patient and/or an individual authorized to act on behalf of a patient]. They were informed they may decline to participate or withdraw from participation in pharmacy services at any time.       [1]   Allergies  Allergen Reactions    Other Other    Sulfa (Sulfonamide Antibiotics) Unknown    Miconazole Rash    Neomycin-Bacitracin-Polymyxin Rash   [2]   Current Outpatient Medications on File Prior to Visit   Medication Sig Dispense Refill    aspirin 81 mg EC tablet Take 1 tablet (81 mg) by mouth once daily.      blood sugar diagnostic strip Use as directed to test blood sugar three times a day 100 each 11    blood-glucose meter misc Use as directed to test blood sugar once daily 1 each 0    celecoxib (CeleBREX) 200 mg capsule Take 1 capsule (200 mg) by mouth once daily. (Patient not taking: Reported on 5/8/2025) 90 capsule 1    famotidine (Pepcid) 10 mg tablet Take 1 tablet (10 mg) by mouth once daily as needed for heartburn.      lancets misc  "Use as directed to test blood sugar three times a day 100 each 3    levothyroxine (Synthroid, Levoxyl) 112 mcg tablet Take 1 tablet (112 mcg) by mouth once daily. 90 tablet 1    metoprolol succinate XL (Toprol-XL) 25 mg 24 hr tablet Take 1 tablet (25 mg) by mouth once daily. Do not crush or chew. 90 tablet 3    mometasone (Elocon) 0.1 % cream Apply topically once daily. 15 g 0    pen needle, diabetic 31 gauge x 5/16\" needle Use as directed to inject insulin once daily 100 each 11    predniSONE (Deltasone) 1 mg tablet Take 3 tablets (3 mg) by mouth once daily. (Patient taking differently: Take 1 tablet (1 mg) by mouth once daily.) 90 tablet 2    sacubitriL-valsartan (Entresto) 24-26 mg tablet Take 0.5 tablets by mouth 2 times a day. 90 tablet 3    tirzepatide (Mounjaro) 2.5 mg/0.5 mL pen injector Inject 2.5 mg under the skin 1 (one) time per week. 2 mL 2    traZODone (Desyrel) 50 mg tablet Take 1 tablet (50 mg) by mouth as needed at bedtime. (Patient not taking: Reported on 5/8/2025)      [DISCONTINUED] acetaminophen (Tylenol) 325 mg tablet Take 2 tablets (650 mg) by mouth every 6 hours if needed for moderate pain (4 - 6), mild pain (1 - 3), headaches or fever (temp greater than 38.0 C). (Patient not taking: Reported on 5/8/2025)      [DISCONTINUED] amoxicillin (Amoxil) 500 mg capsule Take 4 caps (2000 mg) 30-60mins prior to your dental appointment (Patient not taking: Reported on 5/8/2025) 4 capsule 5    [DISCONTINUED] FreeStyle Levon 3 [place] misc Use as instructed (Patient not taking: Reported on 5/8/2025) 1 each 0    [DISCONTINUED] hydrocortisone 2.5 % cream Apply topically 2 times a day for 14 days. (Patient not taking: Reported on 5/8/2025) 20 g 3    [DISCONTINUED] insulin degludec (Tresiba FlexTouch) 100 unit/mL (3 mL) pen Inject 7 Units under the skin once daily at bedtime. Take as directed per insulin instructions. (Patient taking differently: Inject 6 Units under the skin once daily at bedtime. Take as " directed per insulin instructions.)      [DISCONTINUED] levothyroxine (Synthroid, Levoxyl) 125 mcg tablet Take 1 tablet (125 mcg) by mouth once daily. 90 tablet 1     No current facility-administered medications on file prior to visit.

## 2025-05-15 PROCEDURE — RXMED WILLOW AMBULATORY MEDICATION CHARGE

## 2025-05-16 ENCOUNTER — PHARMACY VISIT (OUTPATIENT)
Dept: PHARMACY | Facility: CLINIC | Age: 80
End: 2025-05-16
Payer: COMMERCIAL

## 2025-05-19 ENCOUNTER — APPOINTMENT (OUTPATIENT)
Facility: CLINIC | Age: 80
End: 2025-05-19
Payer: MEDICARE

## 2025-05-27 PROCEDURE — RXMED WILLOW AMBULATORY MEDICATION CHARGE

## 2025-05-27 NOTE — PROGRESS NOTES
"  Pharmacist Clinic: Cardiology Management    Kristin Charles \"Edel\" is a 79 y.o. female was referred to Clinical Pharmacy Team for Heart Failure management.     Referring Provider: Cruz Fang MD    THIS IS A FOLLOW UP PATIENT APPOINTMENT. AT LAST VISIT ON 2/26/25 WITH PHARMACIST (Juno Klein).    Appointment was completed by Edel who was reached at primary number.    REVIEW OF PAST APPNT (IF APPLICABLE):   During last appointment Edel was screened and approved for another year of  PAP to cover Entresto and T2DM medications through PCP office.  Previously trialed Jardiance but stopped due to yeast infection.    Allergies[1]    Medical History[2]    Medications Ordered Prior to Encounter[3]      RELEVANT LAB RESULTS:  Lab Results   Component Value Date    BILITOT 0.5 05/02/2025    CALCIUM 9.4 05/02/2025    CO2 29 05/02/2025     05/02/2025    CREATININE 0.81 05/02/2025    GLUCOSE 152 (H) 05/02/2025    ALKPHOS 51 05/02/2025    K 4.4 05/02/2025    PROT 6.1 05/02/2025     05/02/2025    AST 11 05/02/2025    ALT 12 05/02/2025    BUN 17 05/02/2025    ANIONGAP 8 05/02/2025    MG 2.03 06/18/2024    PHOS 3.3 10/07/2022    ALBUMIN 3.9 05/02/2025    GFRF CANCELED 05/27/2023    GFRMALE CANCELED 05/27/2023     Lab Results   Component Value Date    TRIG 73 05/02/2025    CHOL 157 05/02/2025    LDLCALC 83 05/02/2025    HDL 59 05/02/2025     No results found for: \"BMCBC\", \"CBCDIF\"     PHARMACEUTICAL ASSESSMENT:    MEDICATION RECONCILIATION    Was a medication reconciliation completed at this visit? No    Drug Interactions? No    Medication Documentation Review Audit       Reviewed by Melanie Muñoz DO (Physician) on 05/08/25 at 1805      Medication Order Taking? Sig Documenting Provider Last Dose Status    Patient not taking:   Discontinued 05/08/25 0912    Patient not taking:   Discontinued 05/08/25 0912   aspirin 81 mg EC tablet [id number] Yes Take 1 tablet (81 mg) by mouth once daily. Historical " "Provider, MD  Active   blood sugar diagnostic strip [id number] Yes Use as directed to test blood sugar three times a day Melanie Muñoz DO  Active   blood-glucose meter misc [id number] Yes Use as directed to test blood sugar once daily Melanie Muñoz DO  Active   celecoxib (CeleBREX) 200 mg capsule [id number]  Take 1 capsule (200 mg) by mouth once daily.   Patient not taking: Reported on 5/8/2025    Melanie Muñoz DO  Active   famotidine (Pepcid) 10 mg tablet [id number] Yes Take 1 tablet (10 mg) by mouth once daily as needed for heartburn. Historical Provider, MD  Active    Patient not taking:   Discontinued 05/08/25 0914    Patient not taking:   Discontinued 05/08/25 0913   insulin degludec (Tresiba FlexTouch) 100 unit/mL (3 mL) pen [id number] Yes Inject 7 Units under the skin once daily at bedtime. Take as directed per insulin instructions.   Patient taking differently: Inject 6 Units under the skin once daily at bedtime. Take as directed per insulin instructions.    Marcus Muñoz MD  Active   lancets misc [id number] Yes Use as directed to test blood sugar three times a day Melanie Muñoz DO  Active   levothyroxine (Synthroid, Levoxyl) 112 mcg tablet [id number]  Take 1 tablet (112 mcg) by mouth once daily. Melanie Muñoz DO  Active    Discontinued 05/08/25 0929   metoprolol succinate XL (Toprol-XL) 25 mg 24 hr tablet [id number] Yes Take 1 tablet (25 mg) by mouth once daily. Do not crush or chew. Cruz Fang MD  Active   mometasone (Elocon) 0.1 % cream [id number] Yes Apply topically once daily. Melanie Muñoz DO  Active   pen needle, diabetic 31 gauge x 5/16\" needle [id number] Yes Use as directed to inject insulin once daily Melanie Muñoz DO  Active   predniSONE (Deltasone) 1 mg tablet [id number] Yes Take 3 tablets (3 mg) by mouth once daily.   Patient taking differently: Take 1 tablet (1 mg) by mouth once daily.    Mellissa Webster MD  Active   sacubitriL-valsartan (Entresto) 24-26 mg tablet " [id number] Yes Take 0.5 tablets by mouth 2 times a day. Cruz Fang MD  Active   tirzepatide (Mounjaro) 2.5 mg/0.5 mL pen injector [id number] Yes Inject 2.5 mg under the skin 1 (one) time per week. Melanie Muñoz DO  Active   traZODone (Desyrel) 50 mg tablet [id number]  Take 1 tablet (50 mg) by mouth as needed at bedtime.   Patient not taking: Reported on 5/8/2025    Historical Provider, MD  Active                    DISEASE MANAGEMENT ASSESSMENT:     CHF ASSESSMENT     Symptom/Staging:  -Most recent ejection fraction: 50-55%  -NYHA Stage: I    Results for orders placed during the hospital encounter of 07/22/24    Transthoracic echo (TTE) complete    Narrative  [facility], [address] [place] [address]  Tel [phone] and Fax [phone]    TRANSTHORACIC ECHOCARDIOGRAM REPORT      Patient Name:      CHEL CRUZBRINA     Reading Physician:    [id number] Raul Trivedi MD  Study Date:        7/22/2024            Ordering Provider:    [id number] MARSHA VILLAREAL  MRN/PID:           [id number]             Fellow:  Accession#:        [id number]         Nurse:  Date of Birth/Age: 1945 / 79 years Sonographer:          Rj Sharma  [facility]  Gender:            F                    Additional Staff:  Height:            154.94 cm            Admit Date:  Weight:            68.95 kg             Admission Status:     Outpatient  BSA / BMI:         1.68 m2 / 28.72      Encounter#:           [id number]  kg/m2  Blood Pressure:    144/62 mmHg          Department Location:  [facility]    Study Type:    TRANSTHORACIC ECHO (TTE) COMPLETE  Diagnosis/ICD: Presence of prosthetic heart valve-Z95.2; Nonrheumatic aortic  (valve) stenosis-I35.0  Indication:    AS, s/p TAVR (26mm Haile) on 6/17/2024  CPT Code:      Echo Complete w Full Doppler-[id number]    Patient History:  Pertinent History: LAYTON, AS, s/p TAVR (26mm Haile) on 6/17/2024, CKD, GERD,  HTN.    Study Detail: The following Echo studies were  performed: 2D, M-Mode, Doppler and  color flow. Technically challenging study due to the patient's  lack of cooperation, body habitus and active neuralgia pain.      PHYSICIAN INTERPRETATION:  Left Ventricle: The left ventricular systolic function is low normal, with a visually estimated ejection fraction of 50-55%. There are no regional wall motion abnormalities. The left ventricular cavity size is normal. Spectral Doppler shows an impaired relaxation pattern of left ventricular diastolic filling.  Left Atrium: The left atrium is mildly dilated.  Right Ventricle: The right ventricle is normal in size. There is normal right ventricular global systolic function.  Right Atrium: The right atrium is normal in size.  Aortic Valve: There is a prosthetic aortic valve present. The aortic valve dimensionless index is 0.23. There is no evidence of aortic valve regurgitation. The peak instantaneous gradient of the aortic valve is 25.4 mmHg. The mean gradient of the aortic valve is 14.5 mmHg.  Mitral Valve: The mitral valve is normal in structure. There is mild to moderate mitral valve regurgitation.  Tricuspid Valve: The tricuspid valve is structurally normal. There is mild tricuspid regurgitation.  Pulmonic Valve: The pulmonic valve is not well visualized. There is no indication of pulmonic valve regurgitation.  Pericardium: There is no pericardial effusion noted.  Aorta: The aortic root is normal.  Systemic Veins: The inferior vena cava was not well visualized.  In comparison to the previous echocardiogram(s): No significant changes compared to prior cardiogram dated 6/18/2024.      CONCLUSIONS:  1. The left ventricular systolic function is low normal, with a visually estimated ejection fraction of 50-55%.  2. Spectral Doppler shows an impaired relaxation pattern of left ventricular diastolic filling.  3. There is normal right ventricular global systolic function.  4. The left atrium is mildly dilated.  5. Mild to moderate  mitral valve regurgitation.  6. Hemodynamics are consistent with normal functioning of the bioprosthetic TAVR valve.    QUANTITATIVE DATA SUMMARY:  2D MEASUREMENTS:  Normal Ranges:  LAs:           4.26 cm   (2.7-4.0cm)  IVSd:          1.09 cm   (0.6-1.1cm)  LVPWd:         1.13 cm   (0.6-1.1cm)  LVIDd:         4.18 cm   (3.9-5.9cm)  LVIDs:         2.69 cm  LV Mass Index: 94.3 g/m2  LV % FS        35.5 %    LA VOLUME:  Normal Ranges:  LA Vol A4C:        54.9 ml    (22+/-6mL/m2)  LA Vol A2C:        63.0 ml  LA Vol BP:         59.3 ml  LA Vol Index A4C:  32.7 ml/m2  LA Vol Index A2C:  37.5 ml/m2  LA Vol Index BP:   35.3 ml/m2  LA Area A4C:       18.5 cm2  LA Area A2C:       20.0 cm2  LA Major Axis A4C: 5.3 cm  LA Major Axis A2C: 5.4 cm  LA Vol A4C:        51.9 ml  LA Vol A2C:        59.4 ml    AORTA MEASUREMENTS:  Normal Ranges:  Asc Ao, d: 3.60 cm (2.1-3.4cm)    LV SYSTOLIC FUNCTION BY 2D PLANIMETRY (MOD):  Normal Ranges:  EF-A4C View:    49 % (>=55%)  EF-A2C View:    65 %  EF-Visual:      53 %  LV EF Reported: 53 %    LV DIASTOLIC FUNCTION:  Normal Ranges:  MV Peak E:        0.68 m/s    (0.7-1.2 m/s)  MV Peak A:        1.15 m/s    (0.42-0.7 m/s)  E/A Ratio:        0.59        (1.0-2.2)  MV e'             0.090 m/s   (>8.0)  MV lateral e'     0.12 m/s  MV medial e'      0.06 m/s  MV A Dur:         211.07 msec  E/e' Ratio:       7.53        (<8.0)  PulmV Sys Elias:    49.38 cm/s  PulmV Vargas Elias:   27.00 cm/s  PulmV S/D Elias:    1.83  PulmV A Revs Elias: 26.16 cm/s    MITRAL VALVE:  Normal Ranges:  MV DT: 349 msec (150-240msec)    AORTIC VALVE:  Normal Ranges:  AoV Vmax:                2.52 m/s  (<=1.7m/s)  AoV Peak P.4 mmHg (<20mmHg)  AoV Mean P.5 mmHg (1.7-11.5mmHg)  LVOT Max Elias:            0.72 m/s  (<=1.1m/s)  AoV VTI:                 67.61 cm  (18-25cm)  LVOT VTI:                15.65 cm  AoV Dimensionless Index: 0.23      RIGHT VENTRICLE:  RV Basal 3.30 cm  RV Mid   2.30 cm  RV Major  6.5 cm  TAPSE:   20.0 mm  RV s'    0.19 m/s    TRICUSPID VALVE/RVSP:  Normal Ranges:  Peak TR Velocity: 2.36 m/s  RV Syst Pressure: 25.3 mmHg (< 30mmHg)  IVC Diam:         1.50 cm    PULMONIC VALVE:  Normal Ranges:  PV Max Elias: 0.9 m/s  (0.6-0.9m/s)  PV Max PG:  3.1 mmHg    Pulmonary Veins:  PulmV A Revs Elias: 26.16 cm/s  PulmV Vargas Elias:   27.00 cm/s  PulmV S/D Elias:    1.83  PulmV Sys Elias:    49.38 cm/s      [id number] Raul Trivedi MD  Electronically signed on 7/22/2024 at 11:26:02 AM        ** Final **        Guideline-Directed Medical Therapy:  -ARNI: Yes, describe: Entresto 12-13mg BID  -Beta Blocker: Yes, describe: metoprolol succinate 25mg daily  -MRA: No  -SGLT2i: No - recurrent yeast infections    Secondary Prevention:  -The ASCVD Risk score (Jean-Paul DK, et al., 2019) failed to calculate for the following reasons:    The 2019 ASCVD risk score is only valid for ages 40 to 79   -Aspirin 81mg? yes  -Statin?: No  -HTN?: Yes, describe: controlled     CURRENT PHARMACOTHERAPY:   Entresto 24-26mg 0.5 tablet BID  Metoprolol succinate 25mg daily    Affordability: [facility]  Adherence/Compliance: reports adherence  Adverse Effects: none reported    Monitoring Weights at Home: Yes  Home Weight Recordings: 150lbs    Past In Office Weight Readings:   Wt Readings from Last 6 Encounters:   05/08/25 72.1 kg (159 lb)   03/20/25 70.8 kg (156 lb)   03/07/25 71.5 kg (157 lb 10.1 oz)   02/13/25 69.9 kg (154 lb)   01/11/25 70.3 kg (155 lb)   11/14/24 69.8 kg (153 lb 12.8 oz)       Monitoring Blood Pressure at Home: Yes  Home BP Recordings: 120s/70s    Past In Office BP Readings:   BP Readings from Last 6 Encounters:   05/08/25 126/72   03/20/25 133/75   03/07/25 129/65   02/13/25 133/81   01/11/25 124/72   11/14/24 124/77       HEALTH MANAGEMENT    Maintaining fluid restriction (<2 L/day): N/A  Edema/swelling: No  Shortness of breath: No  Trouble sleeping/lying down: No  Dry/hacking cough: No  Recent Hospitalizations:  No    EDUCATION/COUNSELING:   - Counseled patient on MOA, expectations, duration of therapy, contraindications, administration, and monitoring parameters  - Counseled patient on lifestyle modifications that can decrease your risk of having complications (smoking cessation, losing weight, daily weights, vaccines)  - Counseled patient on fluid intake and weight management. Recommended to not consume more than 2 liters of fliuids per day. If they have gained more than 2-3 pounds within a 24 hours period (or 5 pounds in a week), contact their cardiologist  - Answered all patient questions and concerns       DISCUSSION/NOTES:   Reports adherence to all medications.   Home BP readings have been stable. Dose not always check at home when she has frequent in office visits.  Exercising more frequently and started on Mounjaro.  Has not had any issues with heart palpitations or chest tightness while exercising.    ASSESSMENT:    Assessment/Plan   Problem List Items Addressed This Visit       Heart failure with mid-range ejection fraction    Tolerating 2 GDMT medications at this time.  Will not rechallenge SGLT2 due to yeast infections.  No dose adjustments needed based on kidney and liver function.         Relevant Orders    Referral to Clinical Pharmacy         RECOMMENDATIONS/PLAN:    CONTINUE  Entresto 24-26mg 0.5 tablet BID  Metoprolol succinate 25mg daily    Last Appnt with Referring Provider: 8/26/24  Next Appnt with Referring Provider: 9/3/25  Clinical Pharmacist follow up: 11/26/25  VAF/Application Expiration: Yes    Date: 4/21/26  Type of Encounter: Zaida Klein PharmD    Verbal consent to manage patient's drug therapy was obtained from the patient . They were informed they may decline to participate or withdraw from participation in pharmacy services at any time.    Continue all meds under the continuation of care with the referring provider and clinical pharmacy team.            [1]    Allergies  Allergen Reactions    Other Other    Sulfa (Sulfonamide Antibiotics) Unknown    Miconazole Rash    Neomycin-Bacitracin-Polymyxin Rash   [2]   Past Medical History:  Diagnosis Date    Abnormal ECG     Actinic keratosis     Acute candidiasis of vulva and vagina     Yeast infection of the vagina    Acute laryngitis 03/20/2018    Acute laryngitis    Lee's cyst of knee 04/06/2023    Basal cell carcinoma     20+years    Bitten or stung by nonvenomous insect and other nonvenomous arthropods, initial encounter 09/02/2015    Bug bite with infection    Candidiasis, unspecified 10/13/2017    Yeast infection    CHF (congestive heart failure)     Chronic ethmoidal sinusitis 11/24/2020    Chronic ethmoidal sinusitis    Chronic maxillary sinusitis 08/06/2020    Chronic maxillary sinusitis    Diabetes mellitus (Multi)     Disease of thyroid gland     LAYTON (dyspnea on exertion) 09/28/2023    Eczema     GERD (gastroesophageal reflux disease)     Hair loss 04/06/2023    Heart murmur     Heart valve disease     Laceration without foreign body of unspecified forearm, initial encounter 01/09/2022    Forearm laceration    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    Lumbago with sciatica, left side 03/29/2019    Chronic bilateral low back pain with left-sided sciatica    Lymphocytic colitis 05/05/2023    Colonoscopy: 4/24/2023    Nail disorder, unspecified 08/08/2023    Nasal turbinate hypertrophy 04/06/2023    Neoplasm of uncertain behavior of skin 08/08/2023    Obesity 01/19/2015    Onychomycosis 04/06/2023    Open wound of hip and thigh with tendon involvement 05/24/2018    Osteoarthritis 04/06/2023    Osteoarthritis, localized, knee 04/06/2023    Overweight (BMI 25.0-29.9) 04/06/2023    Personal history of diseases of the skin and subcutaneous tissue 01/09/2022    History of contact dermatitis    Personal history of diseases of the skin and subcutaneous tissue 09/02/2015    History of cellulitis    Personal history of other diseases of  the musculoskeletal system and connective tissue     Personal history of arthritis    Personal history of other diseases of the nervous system and sense organs     History of cataract    Personal history of other diseases of the nervous system and sense organs 09/20/2019    History of sciatica    Personal history of other endocrine, nutritional and metabolic disease     History of hypothyroidism    Personal history of other infectious and parasitic diseases 09/23/2017    History of erysipelas    Personal history of other specified conditions 03/06/2020    History of epistaxis    Rash and other nonspecific skin eruption 06/15/2015    Skin rash    Rhinitis 10 years    Sciatica 12/11/2014    Status post joint replacement 06/19/2023    Strain of muscle, fascia and tendon of the posterior muscle group at thigh level, unspecified thigh, initial encounter 05/01/2018    Hamstring strain    Tinnitus 10 years    Vaginal discharge 04/06/2023    Varus deformity, not elsewhere classified, unspecified knee 03/06/2019    Acquired genu varum   [3]   Current Outpatient Medications on File Prior to Visit   Medication Sig Dispense Refill    aspirin 81 mg EC tablet Take 1 tablet (81 mg) by mouth once daily.      blood sugar diagnostic Use as directed to test blood sugar three times a day 100 each 11    blood-glucose meter misc Use as directed to test blood sugar once daily 1 each 0    celecoxib (CeleBREX) 200 mg capsule Take 1 capsule (200 mg) by mouth once daily. (Patient not taking: Reported on 5/8/2025) 90 capsule 1    famotidine (Pepcid) 10 mg tablet Take 1 tablet (10 mg) by mouth once daily as needed for heartburn.      lancets misc Use as directed to test blood sugar three times a day 100 each 3    levothyroxine (Synthroid, Levoxyl) 112 mcg tablet Take 1 tablet (112 mcg) by mouth once daily. 90 tablet 1    metoprolol succinate XL (Toprol-XL) 25 mg 24 hr tablet Take 1 tablet (25 mg) by mouth once daily. Do not crush or chew. 90  "tablet 3    mometasone (Elocon) 0.1 % cream Apply topically once daily. 15 g 0    pen needle, diabetic 31 gauge x 5/16\" needle Use as directed to inject insulin once daily 100 each 11    predniSONE (Deltasone) 1 mg tablet Take 3 tablets (3 mg) by mouth once daily. (Patient taking differently: Take 1 tablet (1 mg) by mouth once daily.) 90 tablet 2    sacubitriL-valsartan (Entresto) 24-26 mg tablet Take 0.5 tablets by mouth 2 times a day. 90 tablet 3    tirzepatide (Mounjaro) 2.5 mg/0.5 mL pen injector Inject 2.5 mg under the skin 1 (one) time per week. 2 mL 2    traZODone (Desyrel) 50 mg tablet Take 1 tablet (50 mg) by mouth as needed at bedtime. (Patient not taking: Reported on 5/8/2025)       No current facility-administered medications on file prior to visit.     "

## 2025-05-28 ENCOUNTER — APPOINTMENT (OUTPATIENT)
Dept: PHARMACY | Facility: HOSPITAL | Age: 80
End: 2025-05-28
Payer: MEDICARE

## 2025-05-28 DIAGNOSIS — I50.22 HEART FAILURE WITH MID-RANGE EJECTION FRACTION: ICD-10-CM

## 2025-05-29 ENCOUNTER — PHARMACY VISIT (OUTPATIENT)
Dept: PHARMACY | Facility: CLINIC | Age: 80
End: 2025-05-29
Payer: COMMERCIAL

## 2025-06-03 ENCOUNTER — TELEMEDICINE (OUTPATIENT)
Dept: PHARMACY | Facility: HOSPITAL | Age: 80
End: 2025-06-03
Payer: MEDICARE

## 2025-06-03 DIAGNOSIS — E11.9 DIABETES MELLITUS, TYPE II, INSULIN DEPENDENT (MULTI): ICD-10-CM

## 2025-06-03 DIAGNOSIS — E11.9 DIABETES MELLITUS TYPE II, NON INSULIN DEPENDENT (MULTI): ICD-10-CM

## 2025-06-03 DIAGNOSIS — Z79.4 DIABETES MELLITUS, TYPE II, INSULIN DEPENDENT (MULTI): ICD-10-CM

## 2025-06-03 RX ORDER — TIRZEPATIDE 5 MG/.5ML
5 INJECTION, SOLUTION SUBCUTANEOUS WEEKLY
Qty: 2 ML | Refills: 3 | Status: SHIPPED | OUTPATIENT
Start: 2025-06-03

## 2025-06-03 NOTE — ASSESSMENT & PLAN NOTE
INCREASE dose of Mounjaro to 5mg subcutaneous once weekly  Finish current box before moving up  Educate on side effects of dose increase  Reinforce patient that to maximize weight loss and prevent return weight gain, patient has to combine GLP-1 with good diet and exercise  DASH Diet  150 mins per week of exercise  FUV in 8 weeks regarding side effects and dose titration

## 2025-06-03 NOTE — PROGRESS NOTES
"[facility] Pharmacy Consult  Kristin Charles \"Edel\" is a 79 y.o. female was referred to Clinical Pharmacy Team for a Pharmacy consult.  The patient was referred for their Diabetes.    Referring Provider: Melanie Muñoz DO    Subjective   Allergies[1]    [facility] - [place], [place] - [address]  [address] [facility] [address]  Phone: [phone] Fax: [phone]    [facility]  [address], [address]  [facility] [address]  Phone: [phone] Fax: [phone]    [facility]  [address], [address]  [facility] [address]  Phone: [phone] Fax: [phone]      HPI  Diabetes  She presents for her follow-up diabetic visit. She has type 2 diabetes mellitus. Her disease course has been stable. There are no hypoglycemic associated symptoms. There are no diabetic associated symptoms. There are no hypoglycemic complications. Symptoms are stable. There are no diabetic complications.     Denies any issue with Mounjaro. Reported very mild side effects. Denies any n/v or stomach upset that significantly hinders daily life.     Glucose levels averages around 155 mg/dL in the AM. Glucose PM is around 124 mg/dL.     Weight is down about 6 lbs. Appetite  suppression. Eating about 2 meals a day now.   Review of Systems    Objective     There were no vitals taken for this visit.     LAB  Lab Results   Component Value Date    BILITOT 0.5 05/02/2025    CALCIUM 9.4 05/02/2025    CO2 29 05/02/2025     05/02/2025    CREATININE 0.81 05/02/2025    GLUCOSE 152 (H) 05/02/2025    ALKPHOS 51 05/02/2025    K 4.4 05/02/2025    PROT 6.1 05/02/2025     05/02/2025    AST 11 05/02/2025    ALT 12 05/02/2025    BUN 17 05/02/2025    ANIONGAP 8 05/02/2025    MG 2.03 06/18/2024    PHOS 3.3 10/07/2022    ALBUMIN 3.9 05/02/2025    GFRF CANCELED 05/27/2023    GFRMALE CANCELED 05/27/2023     Lab Results   Component Value Date    TRIG 73 05/02/2025    CHOL 157 05/02/2025    " LDLCALC 83 05/02/2025    HDL 59 05/02/2025     Lab Results   Component Value Date    HGBA1C 8.4 (H) 05/02/2025       Medications Ordered Prior to Encounter[2]     HISTORICAL PHARMACOTHERAPY  -none    DRUG INTERACTIONS  - none      Assessment/Plan   Problem List Items Addressed This Visit       Diabetes mellitus, type II, insulin dependent (Multi)    INCREASE dose of Mounjaro to 5mg subcutaneous once weekly  Finish current box before moving up  Educate on side effects of dose increase  Reinforce patient that to maximize weight loss and prevent return weight gain, patient has to combine GLP-1 with good diet and exercise  DASH Diet  150 mins per week of exercise  FUV in 8 weeks regarding side effects and dose titration         Relevant Medications    tirzepatide (Mounjaro) 5 mg/0.5 mL pen injector    Other Relevant Orders    Referral to Clinical Pharmacy     Other Visit Diagnoses         Diabetes mellitus type II, non insulin dependent (Multi)        Relevant Medications    tirzepatide (Mounjaro) 5 mg/0.5 mL pen injector    Other Relevant Orders    Referral to Clinical Pharmacy             Continue all meds under the continuation of care with the referring provider and clinical pharmacy team.    Luis Pelaez PharmD     Verbal consent to manage patient's drug therapy was obtained from [the patient and/or an individual authorized to act on behalf of a patient]. They were informed they may decline to participate or withdraw from participation in pharmacy services at any time.       [1]   Allergies  Allergen Reactions    Other Other    Sulfa (Sulfonamide Antibiotics) Unknown    Miconazole Rash    Neomycin-Bacitracin-Polymyxin Rash   [2]   Current Outpatient Medications on File Prior to Visit   Medication Sig Dispense Refill    aspirin 81 mg EC tablet Take 1 tablet (81 mg) by mouth once daily.      blood sugar diagnostic Use as directed to test blood sugar three times a day 100 each 11    blood-glucose meter misc Use as  "directed to test blood sugar once daily 1 each 0    celecoxib (CeleBREX) 200 mg capsule Take 1 capsule (200 mg) by mouth once daily. (Patient not taking: Reported on 5/8/2025) 90 capsule 1    famotidine (Pepcid) 10 mg tablet Take 1 tablet (10 mg) by mouth once daily as needed for heartburn.      lancets misc Use as directed to test blood sugar three times a day 100 each 3    levothyroxine (Synthroid, Levoxyl) 112 mcg tablet Take 1 tablet (112 mcg) by mouth once daily. 90 tablet 1    metoprolol succinate XL (Toprol-XL) 25 mg 24 hr tablet Take 1 tablet (25 mg) by mouth once daily. Do not crush or chew. 90 tablet 3    mometasone (Elocon) 0.1 % cream Apply topically once daily. 15 g 0    pen needle, diabetic 31 gauge x 5/16\" needle Use as directed to inject insulin once daily 100 each 11    predniSONE (Deltasone) 1 mg tablet Take 3 tablets (3 mg) by mouth once daily. (Patient taking differently: Take 1 tablet (1 mg) by mouth once daily.) 90 tablet 2    sacubitriL-valsartan (Entresto) 24-26 mg tablet Take 0.5 tablets by mouth 2 times a day. 90 tablet 3    traZODone (Desyrel) 50 mg tablet Take 1 tablet (50 mg) by mouth as needed at bedtime. (Patient not taking: Reported on 5/8/2025)      [DISCONTINUED] tirzepatide (Mounjaro) 2.5 mg/0.5 mL pen injector Inject 2.5 mg under the skin 1 (one) time per week. 2 mL 2     No current facility-administered medications on file prior to visit.     "

## 2025-06-08 PROCEDURE — RXMED WILLOW AMBULATORY MEDICATION CHARGE

## 2025-06-10 ENCOUNTER — APPOINTMENT (OUTPATIENT)
Dept: ORTHOPEDIC SURGERY | Facility: CLINIC | Age: 80
End: 2025-06-10
Payer: MEDICARE

## 2025-06-11 ENCOUNTER — APPOINTMENT (OUTPATIENT)
Dept: PHARMACY | Facility: HOSPITAL | Age: 80
End: 2025-06-11
Payer: MEDICARE

## 2025-06-16 ENCOUNTER — PHARMACY VISIT (OUTPATIENT)
Dept: PHARMACY | Facility: CLINIC | Age: 80
End: 2025-06-16
Payer: COMMERCIAL

## 2025-06-19 ENCOUNTER — TELEMEDICINE (OUTPATIENT)
Dept: CARDIOLOGY | Facility: CLINIC | Age: 80
End: 2025-06-19
Payer: MEDICARE

## 2025-06-19 DIAGNOSIS — Z95.2 S/P TAVR (TRANSCATHETER AORTIC VALVE REPLACEMENT): Primary | ICD-10-CM

## 2025-06-19 LAB — TSH SERPL-ACNC: 0.99 MIU/L (ref 0.4–4.5)

## 2025-06-19 PROCEDURE — 99213 OFFICE O/P EST LOW 20 MIN: CPT | Performed by: NURSE PRACTITIONER

## 2025-06-19 PROCEDURE — 1159F MED LIST DOCD IN RCRD: CPT | Performed by: NURSE PRACTITIONER

## 2025-06-19 PROCEDURE — 1160F RVW MEDS BY RX/DR IN RCRD: CPT | Performed by: NURSE PRACTITIONER

## 2025-06-19 PROCEDURE — 1036F TOBACCO NON-USER: CPT | Performed by: NURSE PRACTITIONER

## 2025-06-19 PROCEDURE — RXMED WILLOW AMBULATORY MEDICATION CHARGE

## 2025-06-19 RX ORDER — AMOXICILLIN 500 MG/1
CAPSULE ORAL
Qty: 4 CAPSULE | Refills: 5 | Status: SHIPPED | OUTPATIENT
Start: 2025-06-19 | End: 2027-06-02

## 2025-06-19 NOTE — PROGRESS NOTES
Structural Heart Follow up visit    Chel Mckeon is a 79 y.o. female presents today for 1 year follow up s/p TAVR     denies SOB,LAYTON, fatigue  Recent Hospitalizations  No    patient with Medical History[1]    Results for orders placed or performed in visit on 05/08/25 (from the past 96 hours)   Tsh With Reflex To Free T4 If Abnormal   Result Value Ref Range    TSH W/REFLEX TO FT4 0.99 0.40 - 4.50 mIU/L     *Note: Due to a large number of results and/or encounters for the requested time period, some results have not been displayed. A complete set of results can be found in Results Review.        Transthoracic echo (TTE) complete  Result Date: 7/22/2024   [facility], [address] [place] [address]             Tel [phone] and Fax [phone] TRANSTHORACIC ECHOCARDIOGRAM REPORT  Patient Name:      CHEL MCKEON     Reading Physician:    [id number] Raul Trivedi MD Study Date:        7/22/2024            Ordering Provider:    [id number] MARSHA VILLAREAL MRN/PID:           [id number]             Fellow: Accession#:        [id number]         Nurse: Date of Birth/Age: 1945 / 79 years Sonographer:          Rj Sharma                                                               [facility] Gender:            F                    Additional Staff: Height:            154.94 cm            Admit Date: Weight:            68.95 kg             Admission Status:     Outpatient BSA / BMI:         1.68 m2 / 28.72      Encounter#:           [id number]                    kg/m2 Blood Pressure:    144/62 mmHg          Department Location:  [facility] Study Type:    TRANSTHORACIC ECHO (TTE) COMPLETE Diagnosis/ICD: Presence of prosthetic heart valve-Z95.2; Nonrheumatic aortic                (valve) stenosis-I35.0 Indication:    AS, s/p TAVR (26mm Haile) on 6/17/2024 CPT Code:       Echo Complete w Full [id number] Patient History: Pertinent History: LAYTON, AS, s/p TAVR (26mm Haile) on 6/17/2024, CKD, GERD,                    HTN. Study Detail: The following Echo studies were performed: 2D, M-Mode, Doppler and               color flow. Technically challenging study due to the patient's               lack of cooperation, body habitus and active neuralgia pain.  PHYSICIAN INTERPRETATION: Left Ventricle: The left ventricular systolic function is low normal, with a visually estimated ejection fraction of 50-55%. There are no regional wall motion abnormalities. The left ventricular cavity size is normal. Spectral Doppler shows an impaired relaxation pattern of left ventricular diastolic filling. Left Atrium: The left atrium is mildly dilated. Right Ventricle: The right ventricle is normal in size. There is normal right ventricular global systolic function. Right Atrium: The right atrium is normal in size. Aortic Valve: There is a prosthetic aortic valve present. The aortic valve dimensionless index is 0.23. There is no evidence of aortic valve regurgitation. The peak instantaneous gradient of the aortic valve is 25.4 mmHg. The mean gradient of the aortic valve is 14.5 mmHg. Mitral Valve: The mitral valve is normal in structure. There is mild to moderate mitral valve regurgitation. Tricuspid Valve: The tricuspid valve is structurally normal. There is mild tricuspid regurgitation. Pulmonic Valve: The pulmonic valve is not well visualized. There is no indication of pulmonic valve regurgitation. Pericardium: There is no pericardial effusion noted. Aorta: The aortic root is normal. Systemic Veins: The inferior vena cava was not well visualized. In comparison to the previous echocardiogram(s): No significant changes compared to prior cardiogram dated 6/18/2024.  CONCLUSIONS:  1. The left ventricular systolic function is low normal, with a visually estimated ejection fraction of 50-55%.  2. Spectral  Doppler shows an impaired relaxation pattern of left ventricular diastolic filling.  3. There is normal right ventricular global systolic function.  4. The left atrium is mildly dilated.  5. Mild to moderate mitral valve regurgitation.  6. Hemodynamics are consistent with normal functioning of the bioprosthetic TAVR valve. QUANTITATIVE DATA SUMMARY: 2D MEASUREMENTS:                          Normal Ranges: LAs:           4.26 cm   (2.7-4.0cm) IVSd:          1.09 cm   (0.6-1.1cm) LVPWd:         1.13 cm   (0.6-1.1cm) LVIDd:         4.18 cm   (3.9-5.9cm) LVIDs:         2.69 cm LV Mass Index: 94.3 g/m2 LV % FS        35.5 % LA VOLUME:                               Normal Ranges: LA Vol A4C:        54.9 ml    (22+/-6mL/m2) LA Vol A2C:        63.0 ml LA Vol BP:         59.3 ml LA Vol Index A4C:  32.7 ml/m2 LA Vol Index A2C:  37.5 ml/m2 LA Vol Index BP:   35.3 ml/m2 LA Area A4C:       18.5 cm2 LA Area A2C:       20.0 cm2 LA Major Axis A4C: 5.3 cm LA Major Axis A2C: 5.4 cm LA Vol A4C:        51.9 ml LA Vol A2C:        59.4 ml AORTA MEASUREMENTS:                    Normal Ranges: Asc Ao, d: 3.60 cm (2.1-3.4cm) LV SYSTOLIC FUNCTION BY 2D PLANIMETRY (MOD):                      Normal Ranges: EF-A4C View:    49 % (>=55%) EF-A2C View:    65 % EF-Visual:      53 % LV EF Reported: 53 % LV DIASTOLIC FUNCTION:                               Normal Ranges: MV Peak E:        0.68 m/s    (0.7-1.2 m/s) MV Peak A:        1.15 m/s    (0.42-0.7 m/s) E/A Ratio:        0.59        (1.0-2.2) MV e'             0.090 m/s   (>8.0) MV lateral e'     0.12 m/s MV medial e'      0.06 m/s MV A Dur:         211.07 msec E/e' Ratio:       7.53        (<8.0) PulmV Sys Elias:    49.38 cm/s PulmV Vargas Elias:   27.00 cm/s PulmV S/D Elias:    1.83 PulmV A Revs Elias: 26.16 cm/s MITRAL VALVE:                 Normal Ranges: MV DT: 349 msec (150-240msec) AORTIC VALVE:                                    Normal Ranges: AoV Vmax:                2.52 m/s  (<=1.7m/s) AoV Peak  P.4 mmHg (<20mmHg) AoV Mean P.5 mmHg (1.7-11.5mmHg) LVOT Max Elias:            0.72 m/s  (<=1.1m/s) AoV VTI:                 67.61 cm  (18-25cm) LVOT VTI:                15.65 cm AoV Dimensionless Index: 0.23  RIGHT VENTRICLE: RV Basal 3.30 cm RV Mid   2.30 cm RV Major 6.5 cm TAPSE:   20.0 mm RV s'    0.19 m/s TRICUSPID VALVE/RVSP:                             Normal Ranges: Peak TR Velocity: 2.36 m/s RV Syst Pressure: 25.3 mmHg (< 30mmHg) IVC Diam:         1.50 cm PULMONIC VALVE:                      Normal Ranges: PV Max Elias: 0.9 m/s  (0.6-0.9m/s) PV Max PG:  3.1 mmHg Pulmonary Veins: PulmV A Revs Elias: 26.16 cm/s PulmV Vargas Elias:   27.00 cm/s PulmV S/D Elias:    1.83 PulmV Sys Elias:    49.38 cm/s  [id number] Raul Trivedi MD Electronically signed on 2024 at 11:26:02 AM  ** Final **                 Medications Ordered Prior to Encounter[2]      Heart Failure Follow up    NYHA class 1    Dyspnea on Exertion Denies  Orthopnea Denies  PND Denies  Edema Denies  Fatigue Denies  Exercise Intolerance Denies    Chest pain No  Syncope No  Palpitations No    All organ systems normal               KCCQ Questionnaire    1  Heart failure affects different people in different ways. Some feel shortness of breath while others feel fatigue. Please indicate how much you are limited by heart failure (shortness of breath or fatigue) in your ability to do the following activities over the past 2 weeks.     A.) Showering/bathing  5. Not at All  B.) Walking 1 block on level ground 5. Not at All  C.) Hurrying or Jogging   5. Not at All    2.  Over the past 2 weeks, how many times did you have swelling in your feet, ankles or legs when you woke up in the morning? 5. Never    3.  Over the past 2 weeks, on average, how many times has fatigue limited your ability to do what you wanted? 7. Never    4.  Over the past 2 weeks, on average, how many times has shortness of breath limited your ability to do what you  wanted? 7. Never    5.  Over the past 2 weeks, on average, how many times have you been forced to sleep sitting up in a chair or with at least 3 pillows to prop you up because of shortness of breath? Never    6. Over the past 2 weeks, how much has your heart failure limited your enjoyment of life? It has not limited my enjoyment of life    7. If you had to spend the rest of your life with your heart failure the way it is right now, how would you feel about this? 5. Completely satisfied    8. How much does your heart failure affect your lifestyle? Please indicate how your heart failure may have limited yourparticipation in the following activities over the past 2 weeks    A.)  Hobbies, recreational activities  5. Did not limit at all    B.) Working or doing household chores  5. Did not limit at all    C.) Visiting family or friends out of your home  5. Did not limit at all      Kristin Charles is a 79 y.o. female with a PMH of HFrEF, LVEF 35-40% ( TTE 2/9/2024), Hypertension, Hyperlipidemia, Hypothyroidism, Bilateral total knee arthroplasties, Polymyalgia rheumatica on prednisone, and severe non-rheumatic aortic stenosis s/p TAVR Scottie 3 26mm via B/L femoral artery (Left primary) on 6/17/24 with Dr. Lazaro and Dr. Aranda.      Impression:  - 1 year s/p TAVR  - HF symptoms: Denies any SOB, CP, no edema, or fatigue   - vitals: 129/65, 124/72   - Overall she feels fine, she lost weight 149-150 lbs, last weight per [facility] (159 lbs). She does walking and pool exercises to stay active.     Pt appears to be euvolemic with flat neck veins and no BLE edema.  Work of breathing normal with NAD, skin tone without pallor.    1 year ECHO - WILL ORDER IT     Plan:   - ECHO will be order to evaluate the AV post TAVR   - Cont ASA for life  - Cont current medication regimen  - Cont to increase activity  - No further follow-ups with Structural Heart  - f/u with Dr. Fang (Primary Cards) as scheduled  - Annual ECHO recommended  -  SHdental: Life-long Dental SBE prophylaxis needed - Amoxicillin ordered    Virtual or Telephone Consent    An interactive audio and video telecommunication system which permits real time communications between the patient (at the originating site) and provider (at the distant site) was utilized to provide this telehealth service.   Verbal consent was requested and obtained from Kristin Charles on this date, 06/19/25 for a telehealth visit and the patient's location was confirmed at the time of the visit.    Silke Christianson MSN, [facility]  Acute Care Nurse Practitioner   [facility]  Advanced Interventional Cardiology  Office: [phone]  Fax: [phone]         [1]   Past Medical History:  Diagnosis Date    Abnormal ECG     Actinic keratosis     Acute candidiasis of vulva and vagina     Yeast infection of the vagina    Acute laryngitis 03/20/2018    Acute laryngitis    Lee's cyst of knee 04/06/2023    Basal cell carcinoma     20+years    Bitten or stung by nonvenomous insect and other nonvenomous arthropods, initial encounter 09/02/2015    Bug bite with infection    Candidiasis, unspecified 10/13/2017    Yeast infection    CHF (congestive heart failure)     Chronic ethmoidal sinusitis 11/24/2020    Chronic ethmoidal sinusitis    Chronic maxillary sinusitis 08/06/2020    Chronic maxillary sinusitis    Diabetes mellitus (Multi)     Disease of thyroid gland     LAYTON (dyspnea on exertion) 09/28/2023    Eczema     GERD (gastroesophageal reflux disease)     Hair loss 04/06/2023    Heart murmur     Heart valve disease     Laceration without foreign body of unspecified forearm, initial encounter 01/09/2022    Forearm laceration    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    Lumbago with sciatica, left side 03/29/2019    Chronic bilateral low back pain with left-sided sciatica    Lymphocytic colitis 05/05/2023    Colonoscopy: 4/24/2023    Nail disorder, unspecified 08/08/2023    Nasal turbinate hypertrophy 04/06/2023     Neoplasm of uncertain behavior of skin 08/08/2023    Obesity 01/19/2015    Onychomycosis 04/06/2023    Open wound of hip and thigh with tendon involvement 05/24/2018    Osteoarthritis 04/06/2023    Osteoarthritis, localized, knee 04/06/2023    Overweight (BMI 25.0-29.9) 04/06/2023    Personal history of diseases of the skin and subcutaneous tissue 01/09/2022    History of contact dermatitis    Personal history of diseases of the skin and subcutaneous tissue 09/02/2015    History of cellulitis    Personal history of other diseases of the musculoskeletal system and connective tissue     Personal history of arthritis    Personal history of other diseases of the nervous system and sense organs     History of cataract    Personal history of other diseases of the nervous system and sense organs 09/20/2019    History of sciatica    Personal history of other endocrine, nutritional and metabolic disease     History of hypothyroidism    Personal history of other infectious and parasitic diseases 09/23/2017    History of erysipelas    Personal history of other specified conditions 03/06/2020    History of epistaxis    Rash and other nonspecific skin eruption 06/15/2015    Skin rash    Rhinitis 10 years    Sciatica 12/11/2014    Status post joint replacement 06/19/2023    Strain of muscle, fascia and tendon of the posterior muscle group at thigh level, unspecified thigh, initial encounter 05/01/2018    Hamstring strain    Tinnitus 10 years    Vaginal discharge 04/06/2023    Varus deformity, not elsewhere classified, unspecified knee 03/06/2019    Acquired genu varum   [2]   Current Outpatient Medications on File Prior to Visit   Medication Sig Dispense Refill    aspirin 81 mg EC tablet Take 1 tablet (81 mg) by mouth once daily.      blood sugar diagnostic Use as directed to test blood sugar three times a day 100 each 11    blood-glucose meter misc Use as directed to test blood sugar once daily 1 each 0    celecoxib  "(CeleBREX) 200 mg capsule Take 1 capsule (200 mg) by mouth once daily. (Patient not taking: Reported on 2025) 90 capsule 1    famotidine (Pepcid) 10 mg tablet Take 1 tablet (10 mg) by mouth once daily as needed for heartburn.      lancets misc Use as directed to test blood sugar three times a day 100 each 3    levothyroxine (Synthroid, Levoxyl) 112 mcg tablet Take 1 tablet (112 mcg) by mouth once daily. 90 tablet 1    metoprolol succinate XL (Toprol-XL) 25 mg 24 hr tablet Take 1 tablet (25 mg) by mouth once daily. Do not crush or chew. 90 tablet 3    mometasone (Elocon) 0.1 % cream Apply topically once daily. 15 g 0    [] pen needle, diabetic 31 gauge x 5/16\" needle Use as directed to inject insulin once daily 100 each 11    predniSONE (Deltasone) 1 mg tablet Take 3 tablets (3 mg) by mouth once daily. (Patient taking differently: Take 1 tablet (1 mg) by mouth once daily.) 90 tablet 2    sacubitriL-valsartan (Entresto) 24-26 mg tablet Take 0.5 tablets by mouth 2 times a day. 90 tablet 3    tirzepatide (Mounjaro) 5 mg/0.5 mL pen injector Inject 5 mg under the skin 1 (one) time per week. 2 mL 3    traZODone (Desyrel) 50 mg tablet Take 1 tablet (50 mg) by mouth as needed at bedtime. (Patient not taking: Reported on 2025)       No current facility-administered medications on file prior to visit.     "

## 2025-06-20 ENCOUNTER — PHARMACY VISIT (OUTPATIENT)
Dept: PHARMACY | Facility: CLINIC | Age: 80
End: 2025-06-20
Payer: COMMERCIAL

## 2025-07-22 PROCEDURE — RXMED WILLOW AMBULATORY MEDICATION CHARGE

## 2025-07-24 ENCOUNTER — PHARMACY VISIT (OUTPATIENT)
Dept: PHARMACY | Facility: CLINIC | Age: 80
End: 2025-07-24
Payer: COMMERCIAL

## 2025-07-30 ENCOUNTER — APPOINTMENT (OUTPATIENT)
Dept: PHARMACY | Facility: HOSPITAL | Age: 80
End: 2025-07-30
Payer: MEDICARE

## 2025-07-30 DIAGNOSIS — E11.9 DIABETES MELLITUS, TYPE II, INSULIN DEPENDENT (MULTI): ICD-10-CM

## 2025-07-30 DIAGNOSIS — E11.9 DIABETES MELLITUS TYPE II, NON INSULIN DEPENDENT (MULTI): Primary | ICD-10-CM

## 2025-07-30 DIAGNOSIS — Z79.4 DIABETES MELLITUS, TYPE II, INSULIN DEPENDENT (MULTI): ICD-10-CM

## 2025-07-30 NOTE — PROGRESS NOTES
"[facility] Pharmacy Consult  Kristin Charles \"Edel\" is a 80 y.o. female was referred to Clinical Pharmacy Team for a Pharmacy consult.  The patient was referred for their Diabetes.    Referring Provider: Melanie Muñoz DO    Subjective   Allergies[1]    [facility] - [place], [place] - [address] [facility]  [address] [facility] [address]  Phone: [phone] Fax: [phone]    [facility]  [address], [address]  [facility] [address]  Phone: [phone] Fax: [phone]      HPI  Diabetes  She presents for her follow-up diabetic visit. She has type 2 diabetes mellitus. Her disease course has been stable. There are no hypoglycemic associated symptoms. There are no diabetic associated symptoms. There are no hypoglycemic complications. Symptoms are stable. There are no diabetic complications.     Patient reported some minor side effects for the first dose. Been on 2 dose now. Side effects is manageable.     Weight is down about   Review of Systems    Objective     There were no vitals taken for this visit.     LAB  Lab Results   Component Value Date    BILITOT 0.5 05/02/2025    CALCIUM 9.4 05/02/2025    CO2 29 05/02/2025     05/02/2025    CREATININE 0.81 05/02/2025    GLUCOSE 152 (H) 05/02/2025    ALKPHOS 51 05/02/2025    K 4.4 05/02/2025    PROT 6.1 05/02/2025     05/02/2025    AST 11 05/02/2025    ALT 12 05/02/2025    BUN 17 05/02/2025    ANIONGAP 8 05/02/2025    MG 2.03 06/18/2024    PHOS 3.3 10/07/2022    ALBUMIN 3.9 05/02/2025    GFRF CANCELED 05/27/2023    GFRMALE CANCELED 05/27/2023     Lab Results   Component Value Date    TRIG 73 05/02/2025    CHOL 157 05/02/2025    LDLCALC 83 05/02/2025    HDL 59 05/02/2025     Lab Results   Component Value Date    HGBA1C 8.4 (H) 05/02/2025       Medications Ordered Prior to Encounter[2]       Assessment/Plan   Problem List Items Addressed This Visit       Diabetes mellitus, type II, insulin dependent (Multi)    CONTINUE all " meds as prescribed  DM uncontrolled but improving  Educate on side effects  FUV in 2 months for A1c assessment         Relevant Orders    Referral to Clinical Pharmacy     Other Visit Diagnoses         Diabetes mellitus type II, non insulin dependent (Multi)    -  Primary    Relevant Orders    Referral to Clinical Pharmacy             Continue all meds under the continuation of care with the referring provider and clinical pharmacy team.    Luis Pelaez PharmD     Verbal consent to manage patient's drug therapy was obtained from [the patient and/or an individual authorized to act on behalf of a patient]. They were informed they may decline to participate or withdraw from participation in pharmacy services at any time.       [1]   Allergies  Allergen Reactions    Other Other    Sulfa (Sulfonamide Antibiotics) Unknown    Miconazole Rash    Neomycin-Bacitracin-Polymyxin Rash   [2]   Current Outpatient Medications on File Prior to Visit   Medication Sig Dispense Refill    amoxicillin (Amoxil) 500 mg capsule Take 4 caps (2000 mg) 1 hour prior to procedure. 4 capsule 5    aspirin 81 mg EC tablet Take 1 tablet (81 mg) by mouth once daily.      blood sugar diagnostic Use as directed to test blood sugar three times a day 100 each 11    blood-glucose meter misc Use as directed to test blood sugar once daily 1 each 0    celecoxib (CeleBREX) 200 mg capsule Take 1 capsule (200 mg) by mouth once daily. (Patient not taking: Reported on 5/8/2025) 90 capsule 1    famotidine (Pepcid) 10 mg tablet Take 1 tablet (10 mg) by mouth once daily as needed for heartburn.      lancets misc Use as directed to test blood sugar three times a day 100 each 3    levothyroxine (Synthroid, Levoxyl) 112 mcg tablet Take 1 tablet (112 mcg) by mouth once daily. 90 tablet 1    metoprolol succinate XL (Toprol-XL) 25 mg 24 hr tablet Take 1 tablet (25 mg) by mouth once daily. Do not crush or chew. 90 tablet 3    mometasone (Elocon) 0.1 % cream Apply topically  once daily. 15 g 0    predniSONE (Deltasone) 1 mg tablet Take 3 tablets (3 mg) by mouth once daily. (Patient taking differently: Take 1 tablet (1 mg) by mouth once daily.) 90 tablet 2    sacubitriL-valsartan (Entresto) 24-26 mg tablet Take 0.5 tablets by mouth 2 times a day. 90 tablet 3    tirzepatide (Mounjaro) 5 mg/0.5 mL pen injector Inject 5 mg under the skin 1 (one) time per week. 2 mL 3    traZODone (Desyrel) 50 mg tablet Take 1 tablet (50 mg) by mouth as needed at bedtime. (Patient not taking: Reported on 5/8/2025)       No current facility-administered medications on file prior to visit.

## 2025-07-30 NOTE — ASSESSMENT & PLAN NOTE
CONTINUE all meds as prescribed  DM uncontrolled but improving  Educate on side effects  FUV in 2 months for A1c assessment

## 2025-08-12 ENCOUNTER — APPOINTMENT (OUTPATIENT)
Dept: ORTHOPEDIC SURGERY | Facility: CLINIC | Age: 80
End: 2025-08-12
Payer: MEDICARE

## 2025-08-12 ENCOUNTER — APPOINTMENT (OUTPATIENT)
Dept: PRIMARY CARE | Facility: CLINIC | Age: 80
End: 2025-08-12
Payer: MEDICARE

## 2025-08-12 VITALS
TEMPERATURE: 96.9 F | DIASTOLIC BLOOD PRESSURE: 74 MMHG | WEIGHT: 152 LBS | SYSTOLIC BLOOD PRESSURE: 124 MMHG | BODY MASS INDEX: 27.8 KG/M2

## 2025-08-12 DIAGNOSIS — M67.912 DISORDER OF LEFT ROTATOR CUFF: ICD-10-CM

## 2025-08-12 DIAGNOSIS — G47.9 SLEEP DIFFICULTIES: ICD-10-CM

## 2025-08-12 DIAGNOSIS — E11.9 DIABETES MELLITUS, TYPE II, INSULIN DEPENDENT (MULTI): Primary | ICD-10-CM

## 2025-08-12 DIAGNOSIS — R39.198 SUBJECTIVE CHANGE IN URINATION: ICD-10-CM

## 2025-08-12 DIAGNOSIS — E03.9 HYPOTHYROIDISM, UNSPECIFIED TYPE: ICD-10-CM

## 2025-08-12 DIAGNOSIS — M25.512 LEFT SHOULDER PAIN, UNSPECIFIED CHRONICITY: Primary | ICD-10-CM

## 2025-08-12 DIAGNOSIS — Z79.4 DIABETES MELLITUS, TYPE II, INSULIN DEPENDENT (MULTI): Primary | ICD-10-CM

## 2025-08-12 PROBLEM — N18.30 CKD (CHRONIC KIDNEY DISEASE) STAGE 3, GFR 30-59 ML/MIN (MULTI): Status: RESOLVED | Noted: 2023-04-06 | Resolved: 2025-08-12

## 2025-08-12 LAB — POC HEMOGLOBIN A1C: 7.6 % (ref 4.2–6.5)

## 2025-08-12 PROCEDURE — 83036 HEMOGLOBIN GLYCOSYLATED A1C: CPT | Performed by: FAMILY MEDICINE

## 2025-08-12 PROCEDURE — G2211 COMPLEX E/M VISIT ADD ON: HCPCS | Performed by: FAMILY MEDICINE

## 2025-08-12 PROCEDURE — 3078F DIAST BP <80 MM HG: CPT | Performed by: FAMILY MEDICINE

## 2025-08-12 PROCEDURE — 99213 OFFICE O/P EST LOW 20 MIN: CPT | Performed by: FAMILY MEDICINE

## 2025-08-12 PROCEDURE — 1159F MED LIST DOCD IN RCRD: CPT | Performed by: FAMILY MEDICINE

## 2025-08-12 PROCEDURE — 1160F RVW MEDS BY RX/DR IN RCRD: CPT | Performed by: FAMILY MEDICINE

## 2025-08-12 PROCEDURE — 3074F SYST BP LT 130 MM HG: CPT | Performed by: FAMILY MEDICINE

## 2025-08-12 RX ORDER — LEVOTHYROXINE SODIUM 112 UG/1
112 TABLET ORAL DAILY
Qty: 90 TABLET | Refills: 1 | Status: SHIPPED | OUTPATIENT
Start: 2025-08-12 | End: 2026-02-08

## 2025-08-13 ENCOUNTER — APPOINTMENT (OUTPATIENT)
Dept: CARDIOLOGY | Facility: CLINIC | Age: 80
End: 2025-08-13
Payer: MEDICARE

## 2025-08-14 ENCOUNTER — HOSPITAL ENCOUNTER (OUTPATIENT)
Dept: CARDIOLOGY | Facility: CLINIC | Age: 80
Discharge: HOME | End: 2025-08-14
Payer: MEDICARE

## 2025-08-14 DIAGNOSIS — Z95.2 S/P TAVR (TRANSCATHETER AORTIC VALVE REPLACEMENT): ICD-10-CM

## 2025-08-14 PROCEDURE — 93306 TTE W/DOPPLER COMPLETE: CPT

## 2025-08-14 PROCEDURE — 93306 TTE W/DOPPLER COMPLETE: CPT | Performed by: INTERNAL MEDICINE

## 2025-08-14 PROCEDURE — 0932T N-INVS DET HRT FAIL AUG ECHO: CPT | Performed by: INTERNAL MEDICINE

## 2025-08-15 LAB
AORTIC VALVE MEAN GRADIENT: 12 MMHG
AORTIC VALVE PEAK VELOCITY: 2.45 M/S
AV PEAK GRADIENT: 24 MMHG
AVA (PEAK VEL): 1.16 CM2
AVA (VTI): 1.12 CM2
EJECTION FRACTION APICAL 4 CHAMBER: 57.4
EJECTION FRACTION: 60 %
LEFT ATRIUM VOLUME AREA LENGTH INDEX BSA: 16.1 ML/M2
LEFT VENTRICLE INTERNAL DIMENSION DIASTOLE: 4.44 CM (ref 3.5–6)
LEFT VENTRICULAR OUTFLOW TRACT DIAMETER: 2.14 CM
MITRAL VALVE E/A RATIO: 0.56
RIGHT VENTRICLE FREE WALL PEAK S': 9 CM/S
RIGHT VENTRICLE PEAK SYSTOLIC PRESSURE: 28 MMHG
TRICUSPID ANNULAR PLANE SYSTOLIC EXCURSION: 1.6 CM

## 2025-08-19 DIAGNOSIS — E11.9 DIABETES MELLITUS TYPE II, NON INSULIN DEPENDENT (MULTI): Primary | ICD-10-CM

## 2025-08-19 DIAGNOSIS — Z79.4 DIABETES MELLITUS, TYPE II, INSULIN DEPENDENT (MULTI): ICD-10-CM

## 2025-08-19 DIAGNOSIS — E11.9 DIABETES MELLITUS, TYPE II, INSULIN DEPENDENT (MULTI): ICD-10-CM

## 2025-08-19 PROCEDURE — RXMED WILLOW AMBULATORY MEDICATION CHARGE

## 2025-08-19 RX ORDER — TIRZEPATIDE 2.5 MG/.5ML
2.5 INJECTION, SOLUTION SUBCUTANEOUS WEEKLY
Qty: 2 ML | Refills: 3 | Status: SHIPPED | OUTPATIENT
Start: 2025-08-19

## 2025-08-21 ENCOUNTER — PHARMACY VISIT (OUTPATIENT)
Dept: PHARMACY | Facility: CLINIC | Age: 80
End: 2025-08-21
Payer: COMMERCIAL

## 2025-08-29 ENCOUNTER — APPOINTMENT (OUTPATIENT)
Dept: CARDIOLOGY | Facility: CLINIC | Age: 80
End: 2025-08-29
Payer: MEDICARE

## 2025-09-01 ENCOUNTER — PATIENT MESSAGE (OUTPATIENT)
Dept: PRIMARY CARE | Facility: CLINIC | Age: 80
End: 2025-09-01
Payer: MEDICARE

## 2025-09-01 DIAGNOSIS — Z95.2 S/P TAVR (TRANSCATHETER AORTIC VALVE REPLACEMENT): ICD-10-CM

## 2025-09-02 RX ORDER — AMOXICILLIN 500 MG/1
CAPSULE ORAL
Qty: 4 CAPSULE | Refills: 5 | Status: SHIPPED | OUTPATIENT
Start: 2025-09-02 | End: 2027-08-16

## 2025-09-03 ENCOUNTER — APPOINTMENT (OUTPATIENT)
Dept: CARDIOLOGY | Facility: CLINIC | Age: 80
End: 2025-09-03
Payer: MEDICARE

## 2025-09-09 ENCOUNTER — APPOINTMENT (OUTPATIENT)
Dept: ORTHOPEDIC SURGERY | Facility: CLINIC | Age: 80
End: 2025-09-09
Payer: MEDICARE

## 2025-11-14 ENCOUNTER — APPOINTMENT (OUTPATIENT)
Dept: PRIMARY CARE | Facility: CLINIC | Age: 80
End: 2025-11-14
Payer: MEDICARE

## 2025-11-26 ENCOUNTER — APPOINTMENT (OUTPATIENT)
Dept: PHARMACY | Facility: HOSPITAL | Age: 80
End: 2025-11-26
Payer: MEDICARE

## 2026-01-06 ENCOUNTER — APPOINTMENT (OUTPATIENT)
Dept: DERMATOLOGY | Facility: CLINIC | Age: 81
End: 2026-01-06
Payer: MEDICARE

## 2026-09-08 ENCOUNTER — APPOINTMENT (OUTPATIENT)
Dept: CARDIOLOGY | Facility: CLINIC | Age: 81
End: 2026-09-08
Payer: MEDICARE

## (undated) DEVICE — PAD, ELECTRODE DEFIB PADPRO ADULT STRL W/ADAPTER

## (undated) DEVICE — ACCESS KIT, MINI MAK, 4 FR X 10CM, 0.018 X 40CM, NITINOL/PLATINUM, ECHO TIP

## (undated) DEVICE — DEVICE, CLOSURE, PERCLOSE, PROSTYLE

## (undated) DEVICE — GUIDEWIRE, 3MM, J-TIP, .035 IN X 150CM

## (undated) DEVICE — CATHETER, DIAGNOSTIC, DXTERITY, 6FR PIG155, 110CM.6 SH

## (undated) DEVICE — CATHETER, ANGIO, IMPULSE, AL1, 6 FR X 100 CM

## (undated) DEVICE — 7 FR X 12CM FAST-CATH HEMOSTASIS INTRODUCER,W/LUER LOCK HUB, HEMO VALVE AND SIDEPORT, DILATOR, 50 CM DOUBLE DISTAL GUIDEWIRE WITH J AND STRAIGHT ENDS, 60 CM REPOSITIONING SLEEVE, SYRINGE AND 18 GA. XTW NEEDLE

## (undated) DEVICE — Device

## (undated) DEVICE — ACCESS KIT, S-MAK MINI, 4FR 10CM 0.018IN 40CM, NT/PT, ECHO ENHANCE NEEDLE

## (undated) DEVICE — SHEATH, INTRODUCER, HYDROPHILIC, PRELUDE IDEAL, 7FR, 11CM

## (undated) DEVICE — SHEATH, BRITE TIP 6 X 23

## (undated) DEVICE — CATHETER, ANGIO, IMPULSE, PIGTAIL, 6 FR X 110 CM

## (undated) DEVICE — SHEATH, INTRODUCER, PRELUDE, 6FR 11CML, W/MINI ACCESS KIT

## (undated) DEVICE — GUIDEWIRE, SAFARI 2, .035 X 275CM, EXTRA SMALL CURVE, PRE-SHAPED

## (undated) DEVICE — CABLE, PACING PATIENT BIPOLAR 8'

## (undated) DEVICE — CATHETER, PACING, PACEL, FLOW DIRECTED, 5 FR X 110 CM

## (undated) DEVICE — GUIDE WIRE, 035/190CM, HI-TORGUE SUPRA CORE

## (undated) DEVICE — GUIDEWIRE, INQWIRE, 3MM J, .035, 260